# Patient Record
Sex: MALE | Race: WHITE | Employment: OTHER | URBAN - METROPOLITAN AREA
[De-identification: names, ages, dates, MRNs, and addresses within clinical notes are randomized per-mention and may not be internally consistent; named-entity substitution may affect disease eponyms.]

---

## 2017-10-04 ENCOUNTER — HOSPITAL ENCOUNTER (OUTPATIENT)
Dept: GENERAL RADIOLOGY | Age: 72
Discharge: HOME | End: 2017-10-04
Attending: SPECIALIST
Payer: MEDICARE

## 2017-10-04 DIAGNOSIS — R13.19 OTHER DYSPHAGIA: ICD-10-CM

## 2017-10-04 DIAGNOSIS — R63.4 ABNORMAL WEIGHT LOSS: ICD-10-CM

## 2017-10-04 PROCEDURE — 74220 X-RAY XM ESOPHAGUS 1CNTRST: CPT

## 2018-12-27 ENCOUNTER — APPOINTMENT (OUTPATIENT)
Dept: GENERAL RADIOLOGY | Age: 73
DRG: 470 | End: 2018-12-27
Attending: PHYSICIAN ASSISTANT
Payer: MEDICARE

## 2018-12-27 ENCOUNTER — APPOINTMENT (OUTPATIENT)
Dept: CT IMAGING | Age: 73
DRG: 470 | End: 2018-12-27
Attending: PHYSICIAN ASSISTANT
Payer: MEDICARE

## 2018-12-27 ENCOUNTER — HOSPITAL ENCOUNTER (INPATIENT)
Age: 73
LOS: 6 days | Discharge: NURSING, REHAB OR LONG-TERM CARE | DRG: 470 | End: 2019-01-02
Attending: EMERGENCY MEDICINE | Admitting: INTERNAL MEDICINE
Payer: MEDICARE
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
## 2018-12-27 DIAGNOSIS — F03.90 DEMENTIA WITHOUT BEHAVIORAL DISTURBANCE, UNSPECIFIED DEMENTIA TYPE: ICD-10-CM

## 2018-12-27 DIAGNOSIS — R77.8 ELEVATED TROPONIN: ICD-10-CM

## 2018-12-27 DIAGNOSIS — R56.9 SEIZURE (HCC): ICD-10-CM

## 2018-12-27 DIAGNOSIS — S22.000A CLOSED COMPRESSION FRACTURE OF THORACIC VERTEBRA, INITIAL ENCOUNTER (HCC): ICD-10-CM

## 2018-12-27 DIAGNOSIS — S72.002A CLOSED FRACTURE OF LEFT HIP, INITIAL ENCOUNTER (HCC): ICD-10-CM

## 2018-12-27 DIAGNOSIS — N39.0 URINARY TRACT INFECTION WITHOUT HEMATURIA, SITE UNSPECIFIED: ICD-10-CM

## 2018-12-27 PROBLEM — D69.6 THROMBOCYTOPENIA (HCC): Status: ACTIVE | Noted: 2018-12-27

## 2018-12-27 PROBLEM — S72.009A HIP FRACTURE (HCC): Status: ACTIVE | Noted: 2018-12-27

## 2018-12-27 PROBLEM — I10 HTN (HYPERTENSION): Status: ACTIVE | Noted: 2018-12-27

## 2018-12-27 PROBLEM — N40.0 BPH (BENIGN PROSTATIC HYPERPLASIA): Status: ACTIVE | Noted: 2018-12-27

## 2018-12-27 PROBLEM — K21.9 GERD (GASTROESOPHAGEAL REFLUX DISEASE): Status: ACTIVE | Noted: 2018-12-27

## 2018-12-27 PROBLEM — N17.9 ACUTE ON CHRONIC RENAL FAILURE (HCC): Status: ACTIVE | Noted: 2018-12-27

## 2018-12-27 PROBLEM — F32.A ANXIETY AND DEPRESSION: Status: ACTIVE | Noted: 2018-12-27

## 2018-12-27 PROBLEM — E87.0 HYPERNATREMIA: Status: ACTIVE | Noted: 2018-12-27

## 2018-12-27 PROBLEM — S22.019A T1 VERTEBRAL FRACTURE (HCC): Status: ACTIVE | Noted: 2018-12-27

## 2018-12-27 PROBLEM — N18.9 ACUTE ON CHRONIC RENAL FAILURE (HCC): Status: ACTIVE | Noted: 2018-12-27

## 2018-12-27 PROBLEM — F41.9 ANXIETY AND DEPRESSION: Status: ACTIVE | Noted: 2018-12-27

## 2018-12-27 LAB
ALBUMIN SERPL-MCNC: 2.3 G/DL (ref 3.5–5)
ALBUMIN/GLOB SERPL: 0.6 {RATIO} (ref 1.1–2.2)
ALP SERPL-CCNC: 73 U/L (ref 45–117)
ALT SERPL-CCNC: 16 U/L (ref 12–78)
ANION GAP SERPL CALC-SCNC: 10 MMOL/L (ref 5–15)
APPEARANCE UR: ABNORMAL
AST SERPL-CCNC: 17 U/L (ref 15–37)
BACTERIA URNS QL MICRO: NEGATIVE /HPF
BASOPHILS # BLD: 0.1 K/UL (ref 0–0.1)
BASOPHILS NFR BLD: 1 % (ref 0–1)
BILIRUB SERPL-MCNC: 0.2 MG/DL (ref 0.2–1)
BILIRUB UR QL: NEGATIVE
BUN SERPL-MCNC: 31 MG/DL (ref 6–20)
BUN/CREAT SERPL: 12 (ref 12–20)
CALCIUM SERPL-MCNC: 8.9 MG/DL (ref 8.5–10.1)
CHLORIDE SERPL-SCNC: 113 MMOL/L (ref 97–108)
CK SERPL-CCNC: 287 U/L (ref 39–308)
CO2 SERPL-SCNC: 27 MMOL/L (ref 21–32)
COLOR UR: ABNORMAL
COMMENT, HOLDF: NORMAL
COMMENT, HOLDF: NORMAL
CREAT SERPL-MCNC: 2.64 MG/DL (ref 0.7–1.3)
DIFFERENTIAL METHOD BLD: ABNORMAL
EOSINOPHIL # BLD: 0.2 K/UL (ref 0–0.4)
EOSINOPHIL NFR BLD: 2 % (ref 0–7)
EPITH CASTS URNS QL MICRO: ABNORMAL /LPF
ERYTHROCYTE [DISTWIDTH] IN BLOOD BY AUTOMATED COUNT: 14.3 % (ref 11.5–14.5)
GLOBULIN SER CALC-MCNC: 3.9 G/DL (ref 2–4)
GLUCOSE SERPL-MCNC: 124 MG/DL (ref 65–100)
GLUCOSE UR STRIP.AUTO-MCNC: NEGATIVE MG/DL
HCT VFR BLD AUTO: 35.5 % (ref 36.6–50.3)
HGB BLD-MCNC: 11 G/DL (ref 12.1–17)
HGB UR QL STRIP: ABNORMAL
IMM GRANULOCYTES # BLD: 0.2 K/UL (ref 0–0.04)
IMM GRANULOCYTES NFR BLD AUTO: 2 % (ref 0–0.5)
KETONES UR QL STRIP.AUTO: NEGATIVE MG/DL
LEUKOCYTE ESTERASE UR QL STRIP.AUTO: ABNORMAL
LYMPHOCYTES # BLD: 1.2 K/UL (ref 0.8–3.5)
LYMPHOCYTES NFR BLD: 12 % (ref 12–49)
MAGNESIUM SERPL-MCNC: 1.8 MG/DL (ref 1.6–2.4)
MCH RBC QN AUTO: 29.1 PG (ref 26–34)
MCHC RBC AUTO-ENTMCNC: 31 G/DL (ref 30–36.5)
MCV RBC AUTO: 93.9 FL (ref 80–99)
MONOCYTES # BLD: 1.5 K/UL (ref 0–1)
MONOCYTES NFR BLD: 15 % (ref 5–13)
NEUTS SEG # BLD: 6.8 K/UL (ref 1.8–8)
NEUTS SEG NFR BLD: 68 % (ref 32–75)
NITRITE UR QL STRIP.AUTO: POSITIVE
NRBC # BLD: 0 K/UL (ref 0–0.01)
NRBC BLD-RTO: 0 PER 100 WBC
PH UR STRIP: 5.5 [PH] (ref 5–8)
PLATELET # BLD AUTO: 146 K/UL (ref 150–400)
PMV BLD AUTO: 11.9 FL (ref 8.9–12.9)
POTASSIUM SERPL-SCNC: 3.9 MMOL/L (ref 3.5–5.1)
PROT SERPL-MCNC: 6.2 G/DL (ref 6.4–8.2)
PROT UR STRIP-MCNC: 30 MG/DL
RBC # BLD AUTO: 3.78 M/UL (ref 4.1–5.7)
RBC #/AREA URNS HPF: ABNORMAL /HPF (ref 0–5)
RBC MORPH BLD: ABNORMAL
SAMPLES BEING HELD,HOLD: NORMAL
SAMPLES BEING HELD,HOLD: NORMAL
SODIUM SERPL-SCNC: 150 MMOL/L (ref 136–145)
SP GR UR REFRACTOMETRY: 1.01 (ref 1–1.03)
TROPONIN I SERPL-MCNC: 0.17 NG/ML
TROPONIN I SERPL-MCNC: 0.17 NG/ML
UR CULT HOLD, URHOLD: NORMAL
UROBILINOGEN UR QL STRIP.AUTO: 0.2 EU/DL (ref 0.2–1)
VALPROATE SERPL-MCNC: 44 UG/ML (ref 50–100)
WBC # BLD AUTO: 10 K/UL (ref 4.1–11.1)
WBC URNS QL MICRO: >100 /HPF (ref 0–4)

## 2018-12-27 PROCEDURE — 87077 CULTURE AEROBIC IDENTIFY: CPT

## 2018-12-27 PROCEDURE — 74011250636 HC RX REV CODE- 250/636: Performed by: PHYSICIAN ASSISTANT

## 2018-12-27 PROCEDURE — 94761 N-INVAS EAR/PLS OXIMETRY MLT: CPT

## 2018-12-27 PROCEDURE — 83735 ASSAY OF MAGNESIUM: CPT

## 2018-12-27 PROCEDURE — 72170 X-RAY EXAM OF PELVIS: CPT

## 2018-12-27 PROCEDURE — 99284 EMERGENCY DEPT VISIT MOD MDM: CPT

## 2018-12-27 PROCEDURE — 74011000250 HC RX REV CODE- 250: Performed by: EMERGENCY MEDICINE

## 2018-12-27 PROCEDURE — 82550 ASSAY OF CK (CPK): CPT

## 2018-12-27 PROCEDURE — 74011250636 HC RX REV CODE- 250/636: Performed by: INTERNAL MEDICINE

## 2018-12-27 PROCEDURE — 99285 EMERGENCY DEPT VISIT HI MDM: CPT

## 2018-12-27 PROCEDURE — 73552 X-RAY EXAM OF FEMUR 2/>: CPT

## 2018-12-27 PROCEDURE — 84484 ASSAY OF TROPONIN QUANT: CPT

## 2018-12-27 PROCEDURE — 87186 SC STD MICRODIL/AGAR DIL: CPT

## 2018-12-27 PROCEDURE — 77030020186 HC BOOT HL PROTCT SAGE -B

## 2018-12-27 PROCEDURE — 71045 X-RAY EXAM CHEST 1 VIEW: CPT

## 2018-12-27 PROCEDURE — 81001 URINALYSIS AUTO W/SCOPE: CPT

## 2018-12-27 PROCEDURE — 80164 ASSAY DIPROPYLACETIC ACD TOT: CPT

## 2018-12-27 PROCEDURE — 85025 COMPLETE CBC W/AUTO DIFF WBC: CPT

## 2018-12-27 PROCEDURE — 77030011943

## 2018-12-27 PROCEDURE — 74011250636 HC RX REV CODE- 250/636: Performed by: EMERGENCY MEDICINE

## 2018-12-27 PROCEDURE — 96361 HYDRATE IV INFUSION ADD-ON: CPT

## 2018-12-27 PROCEDURE — 36415 COLL VENOUS BLD VENIPUNCTURE: CPT

## 2018-12-27 PROCEDURE — 93005 ELECTROCARDIOGRAM TRACING: CPT

## 2018-12-27 PROCEDURE — 96374 THER/PROPH/DIAG INJ IV PUSH: CPT

## 2018-12-27 PROCEDURE — 96375 TX/PRO/DX INJ NEW DRUG ADDON: CPT

## 2018-12-27 PROCEDURE — 93970 EXTREMITY STUDY: CPT

## 2018-12-27 PROCEDURE — 72125 CT NECK SPINE W/O DYE: CPT

## 2018-12-27 PROCEDURE — 74011000258 HC RX REV CODE- 258: Performed by: INTERNAL MEDICINE

## 2018-12-27 PROCEDURE — 65270000029 HC RM PRIVATE

## 2018-12-27 PROCEDURE — 87086 URINE CULTURE/COLONY COUNT: CPT

## 2018-12-27 PROCEDURE — 70450 CT HEAD/BRAIN W/O DYE: CPT

## 2018-12-27 PROCEDURE — 80053 COMPREHEN METABOLIC PANEL: CPT

## 2018-12-27 PROCEDURE — 74011250637 HC RX REV CODE- 250/637: Performed by: INTERNAL MEDICINE

## 2018-12-27 RX ORDER — MORPHINE SULFATE 4 MG/ML
2 INJECTION INTRAVENOUS
Status: DISCONTINUED | OUTPATIENT
Start: 2018-12-27 | End: 2019-01-02 | Stop reason: HOSPADM

## 2018-12-27 RX ORDER — VALPROIC ACID 250 MG/1
500 CAPSULE, LIQUID FILLED ORAL EVERY 12 HOURS
COMMUNITY
End: 2019-01-02

## 2018-12-27 RX ORDER — SODIUM CHLORIDE 0.9 % (FLUSH) 0.9 %
5-10 SYRINGE (ML) INJECTION EVERY 8 HOURS
Status: DISCONTINUED | OUTPATIENT
Start: 2018-12-27 | End: 2019-01-02 | Stop reason: SDUPTHER

## 2018-12-27 RX ORDER — OXYBUTYNIN CHLORIDE 10 MG/1
10 TABLET, EXTENDED RELEASE ORAL DAILY
COMMUNITY

## 2018-12-27 RX ORDER — ALBUTEROL SULFATE 0.83 MG/ML
2.5 SOLUTION RESPIRATORY (INHALATION)
Status: DISCONTINUED | OUTPATIENT
Start: 2018-12-27 | End: 2019-01-02 | Stop reason: HOSPADM

## 2018-12-27 RX ORDER — MEMANTINE HYDROCHLORIDE 10 MG/1
10 TABLET ORAL
Status: DISCONTINUED | OUTPATIENT
Start: 2018-12-27 | End: 2019-01-02 | Stop reason: HOSPADM

## 2018-12-27 RX ORDER — HYDROCODONE BITARTRATE AND ACETAMINOPHEN 5; 325 MG/1; MG/1
1 TABLET ORAL
Status: DISCONTINUED | OUTPATIENT
Start: 2018-12-27 | End: 2019-01-02 | Stop reason: HOSPADM

## 2018-12-27 RX ORDER — ACETAMINOPHEN 325 MG/1
650 TABLET ORAL
Status: DISCONTINUED | OUTPATIENT
Start: 2018-12-27 | End: 2018-12-31

## 2018-12-27 RX ORDER — TRAMADOL HYDROCHLORIDE 50 MG/1
100 TABLET ORAL
Status: DISCONTINUED | OUTPATIENT
Start: 2018-12-27 | End: 2018-12-27

## 2018-12-27 RX ORDER — DEXTROSE MONOHYDRATE AND SODIUM CHLORIDE 5; .45 G/100ML; G/100ML
100 INJECTION, SOLUTION INTRAVENOUS CONTINUOUS
Status: DISCONTINUED | OUTPATIENT
Start: 2018-12-27 | End: 2018-12-29

## 2018-12-27 RX ORDER — FENTANYL CITRATE 50 UG/ML
25 INJECTION, SOLUTION INTRAMUSCULAR; INTRAVENOUS
Status: COMPLETED | OUTPATIENT
Start: 2018-12-27 | End: 2018-12-27

## 2018-12-27 RX ORDER — ATORVASTATIN CALCIUM 20 MG/1
40 TABLET, FILM COATED ORAL DAILY
Status: DISCONTINUED | OUTPATIENT
Start: 2018-12-27 | End: 2019-01-02 | Stop reason: HOSPADM

## 2018-12-27 RX ORDER — ACETAMINOPHEN 325 MG/1
650 TABLET ORAL
COMMUNITY

## 2018-12-27 RX ORDER — METOPROLOL TARTRATE 25 MG/1
12.5 TABLET, FILM COATED ORAL 2 TIMES DAILY
Status: DISCONTINUED | OUTPATIENT
Start: 2018-12-27 | End: 2019-01-02 | Stop reason: HOSPADM

## 2018-12-27 RX ORDER — SODIUM CHLORIDE 0.9 % (FLUSH) 0.9 %
5-10 SYRINGE (ML) INJECTION AS NEEDED
Status: DISCONTINUED | OUTPATIENT
Start: 2018-12-27 | End: 2019-01-02 | Stop reason: HOSPADM

## 2018-12-27 RX ORDER — DONEPEZIL HYDROCHLORIDE 5 MG/1
10 TABLET, FILM COATED ORAL
Status: DISCONTINUED | OUTPATIENT
Start: 2018-12-27 | End: 2019-01-02 | Stop reason: HOSPADM

## 2018-12-27 RX ORDER — HEPARIN SODIUM 5000 [USP'U]/ML
5000 INJECTION, SOLUTION INTRAVENOUS; SUBCUTANEOUS ONCE
Status: COMPLETED | OUTPATIENT
Start: 2018-12-27 | End: 2018-12-27

## 2018-12-27 RX ORDER — ISOSORBIDE MONONITRATE 30 MG/1
30 TABLET, EXTENDED RELEASE ORAL DAILY
COMMUNITY

## 2018-12-27 RX ORDER — ALBUTEROL SULFATE 0.83 MG/ML
2.5 SOLUTION RESPIRATORY (INHALATION)
COMMUNITY

## 2018-12-27 RX ORDER — LACOSAMIDE 100 MG/1
100 TABLET ORAL 2 TIMES DAILY
Status: DISCONTINUED | OUTPATIENT
Start: 2018-12-27 | End: 2018-12-31

## 2018-12-27 RX ORDER — ATORVASTATIN CALCIUM 40 MG/1
40 TABLET, FILM COATED ORAL
COMMUNITY

## 2018-12-27 RX ORDER — METOPROLOL TARTRATE 25 MG/1
12.5 TABLET, FILM COATED ORAL 2 TIMES DAILY
COMMUNITY
End: 2019-01-02

## 2018-12-27 RX ORDER — PANTOPRAZOLE SODIUM 40 MG/1
40 TABLET, DELAYED RELEASE ORAL DAILY
Status: DISCONTINUED | OUTPATIENT
Start: 2018-12-27 | End: 2019-01-02 | Stop reason: HOSPADM

## 2018-12-27 RX ORDER — LACOSAMIDE 100 MG/1
100 TABLET ORAL 2 TIMES DAILY
COMMUNITY
End: 2019-01-02

## 2018-12-27 RX ORDER — TRAMADOL HYDROCHLORIDE 50 MG/1
50 TABLET ORAL
Status: DISCONTINUED | OUTPATIENT
Start: 2018-12-27 | End: 2018-12-27 | Stop reason: CLARIF

## 2018-12-27 RX ORDER — OXYBUTYNIN CHLORIDE 5 MG/1
10 TABLET, EXTENDED RELEASE ORAL DAILY
Status: DISCONTINUED | OUTPATIENT
Start: 2018-12-28 | End: 2019-01-02 | Stop reason: HOSPADM

## 2018-12-27 RX ORDER — CEFAZOLIN SODIUM/WATER 2 G/20 ML
2 SYRINGE (ML) INTRAVENOUS
Status: DISCONTINUED | OUTPATIENT
Start: 2018-12-27 | End: 2018-12-27

## 2018-12-27 RX ORDER — ISOSORBIDE MONONITRATE 30 MG/1
30 TABLET, EXTENDED RELEASE ORAL DAILY
Status: DISCONTINUED | OUTPATIENT
Start: 2018-12-27 | End: 2019-01-02 | Stop reason: HOSPADM

## 2018-12-27 RX ORDER — TRAMADOL HYDROCHLORIDE 50 MG/1
50 TABLET ORAL
Status: DISCONTINUED | OUTPATIENT
Start: 2018-12-27 | End: 2018-12-27

## 2018-12-27 RX ORDER — ASPIRIN 81 MG/1
81 TABLET ORAL DAILY
COMMUNITY

## 2018-12-27 RX ORDER — VALPROIC ACID 250 MG/1
500 CAPSULE, LIQUID FILLED ORAL EVERY 12 HOURS
Status: DISCONTINUED | OUTPATIENT
Start: 2018-12-27 | End: 2018-12-30

## 2018-12-27 RX ADMIN — METOPROLOL TARTRATE 12.5 MG: 25 TABLET ORAL at 17:42

## 2018-12-27 RX ADMIN — DONEPEZIL HYDROCHLORIDE 10 MG: 5 TABLET, FILM COATED ORAL at 22:36

## 2018-12-27 RX ADMIN — VALPROIC ACID 500 MG: 250 CAPSULE, LIQUID FILLED ORAL at 22:36

## 2018-12-27 RX ADMIN — MEMANTINE 10 MG: 10 TABLET ORAL at 22:36

## 2018-12-27 RX ADMIN — VALPROIC ACID 500 MG: 250 CAPSULE, LIQUID FILLED ORAL at 17:42

## 2018-12-27 RX ADMIN — SODIUM CHLORIDE 500 ML: 900 INJECTION, SOLUTION INTRAVENOUS at 11:20

## 2018-12-27 RX ADMIN — ATORVASTATIN CALCIUM 40 MG: 20 TABLET, FILM COATED ORAL at 14:35

## 2018-12-27 RX ADMIN — VANCOMYCIN HYDROCHLORIDE 1000 MG: 1 INJECTION, POWDER, LYOPHILIZED, FOR SOLUTION INTRAVENOUS at 17:42

## 2018-12-27 RX ADMIN — MORPHINE SULFATE 2 MG: 4 INJECTION, SOLUTION INTRAMUSCULAR; INTRAVENOUS at 14:31

## 2018-12-27 RX ADMIN — LACOSAMIDE 100 MG: 100 TABLET, FILM COATED ORAL at 17:42

## 2018-12-27 RX ADMIN — Medication 10 ML: at 14:31

## 2018-12-27 RX ADMIN — Medication 10 ML: at 22:37

## 2018-12-27 RX ADMIN — DEXTROSE MONOHYDRATE AND SODIUM CHLORIDE 100 ML/HR: 5; .45 INJECTION, SOLUTION INTRAVENOUS at 13:44

## 2018-12-27 RX ADMIN — DEXTROSE MONOHYDRATE AND SODIUM CHLORIDE 100 ML/HR: 5; .45 INJECTION, SOLUTION INTRAVENOUS at 23:36

## 2018-12-27 RX ADMIN — ISOSORBIDE MONONITRATE 30 MG: 30 TABLET, EXTENDED RELEASE ORAL at 14:35

## 2018-12-27 RX ADMIN — HEPARIN SODIUM 5000 UNITS: 5000 INJECTION INTRAVENOUS; SUBCUTANEOUS at 22:36

## 2018-12-27 RX ADMIN — CEFTRIAXONE SODIUM 1 G: 1 INJECTION, POWDER, FOR SOLUTION INTRAMUSCULAR; INTRAVENOUS at 12:24

## 2018-12-27 RX ADMIN — PANTOPRAZOLE SODIUM 40 MG: 40 TABLET, DELAYED RELEASE ORAL at 14:35

## 2018-12-27 RX ADMIN — FENTANYL CITRATE 25 MCG: 50 INJECTION, SOLUTION INTRAMUSCULAR; INTRAVENOUS at 12:07

## 2018-12-27 NOTE — PROGRESS NOTES
12/27/2018 4:04 PM Spoke with pt's wife who reported she has spoke with [facility] in admissions at [facility] and is paying to hold a bed for pt for 3 days. Pt's wife is paying $425 a day to hold pt's bed at [facility]. CM will follow. 12/27/2018 1:59 PM Spoke with pt's wife, she is going to contact [facility] about holding pt's bed. Pt's wife confirmed she does want pt to return to [facility] at discharge. Referral sent to [facility] via [employer]. CM will follow up.  SHEKHAR Redd

## 2018-12-27 NOTE — CONSULTS
Luh Yap MD, [address] [place] [address]  Shailesh Thornton 33  [phone]    Date of  Admission: 12/27/2018  9:53 AM         IMPRESSION and RECOMMENDATIONS     1.  CAD:  S/P Fall resulting in left hip Fx. Tentatively for repair tomorrow. No active cardiac symptoms, non-ischemic EKG, minimally abnormal troponin. I suspect that his minimally abnormal troponin is due to supply-demand mismatch in a patient with tachycardia due to pain and a chronically occluded LCx by cath (3/2017). Of note, echo 5/4/18 revealed normal LVF (EF 55-60%). I agree with Dr. Tamar Colon plan of checking a 6h troponin. If fairly unremarkable (i.e., < 1), then Mr. Heather Hassan is a Low Risk patient from a cardiac perspective who will be undergoing a Moderate Risk procedure (especially if hip is to be nailed as opposed to ORIF). I recommend proceeding ahead with the procedure without further cardiac testing at this time. Continue metoprolol, lipitor, imdur. Resume ASA when feasible.     Sparkill Kaushik)       Problem List  Date Reviewed: 12/27/2018          Codes Class Noted    Hip fracture ([facility] ICD-10-CM: R88.172L  ICD-9-CM: 820.8  12/27/2018        Thrombocytopenia ([facility] ICD-10-CM: D69.6  ICD-9-CM: 287.5  12/27/2018        Anxiety and depression ICD-10-CM: F41.9, F32.9  ICD-9-CM: 300.00, 311  12/27/2018        BPH (benign prostatic hyperplasia) ICD-10-CM: N40.0  ICD-9-CM: 600.00  12/27/2018        GERD (gastroesophageal reflux disease) ICD-10-CM: K21.9  ICD-9-CM: 530.81  12/27/2018        HTN (hypertension) ICD-10-CM: I10  ICD-9-CM: 401.9  12/27/2018        Seizure ([facility] ICD-10-CM: R56.9  ICD-9-CM: 780.39  12/27/2018        UTI (urinary tract infection) ICD-10-CM: N39.0  ICD-9-CM: 599.0  12/27/2018        Hypernatremia ICD-10-CM: E87.0  ICD-9-CM: 276.0  12/27/2018        Acute on chronic renal failure ([facility] ICD-10-CM: N17.9, N18.9  ICD-9-CM: 584.9, 585.9  12/27/2018        * (Principal) Closed displaced fracture of left femoral neck ([facility] ICD-10-CM: G61.108S  ICD-9-CM: 820.8  12/27/2018        Fall ICD-10-CM: H85. Latrice Ospina  ICD-9-CM: E888.9  12/27/2018        Dementia ICD-10-CM: F03.90  ICD-9-CM: 294.20  12/27/2018        Trimalleolar fracture of right ankle ICD-10-CM: O68.076T  ICD-9-CM: 824.6  10/24/2012              History of Present Illness:     Bashir Tay is a 68 y.o. male with the above problem list who was admitted for Hip fracture ([facility]  Hip fracture ([facility] Hx obtained from chart as Pt is in pain. \"I can't eat. My back hurts. \"  Apparently, he fell again resulting in left hip Fx last PM.      He denies chest pain/discomfort, shortness of breath, dyspnea on exertion, orthopnea, paroxysmal noctural dyspnea, lower extremity edema, palpitations, syncope, or near-syncope.     Current Facility-Administered Medications   Medication Dose Route Frequency    morphine injection 2 mg  2 mg IntraVENous Q4H PRN    sodium chloride (NS) flush 5-10 mL  5-10 mL IntraVENous Q8H    sodium chloride (NS) flush 5-10 mL  5-10 mL IntraVENous PRN    [START ON 12/28/2018] cefTRIAXone (ROCEPHIN) 1 g in 0.9% sodium chloride (MBP/ADV) 50 mL  1 g IntraVENous Q24H    dextrose 5 % - 0.45% NaCl infusion  100 mL/hr IntraVENous CONTINUOUS    HYDROcodone-acetaminophen (NORCO) 5-325 mg per tablet 1 Tab  1 Tab Oral Q6H PRN    vancomycin (VANCOCIN) 1,000 mg in 0.9% sodium chloride (MBP/ADV) 250 mL  1,000 mg IntraVENous ON CALL TO OR    acetaminophen (TYLENOL) tablet 650 mg  650 mg Oral Q6H PRN    albuterol (PROVENTIL VENTOLIN) nebulizer solution 2.5 mg  2.5 mg Nebulization Q4H PRN    atorvastatin (LIPITOR) tablet 40 mg  40 mg Oral DAILY    donepezil (ARICEPT) tablet 10 mg  10 mg Oral QHS    pantoprazole (PROTONIX) tablet 40 mg  40 mg Oral DAILY    isosorbide mononitrate ER (IMDUR) tablet 30 mg  30 mg Oral DAILY    lacosamide (VIMPAT) tablet 100 mg  100 mg Oral BID    memantine (NAMENDA) tablet 10 mg 10 mg Oral QHS    metoprolol tartrate (LOPRESSOR) tablet 12.5 mg  12.5 mg Oral BID    [START ON 12/28/2018] oxybutynin chloride XL (DITROPAN XL) tablet 10 mg  10 mg Oral DAILY    valproic acid (DEPAKENE) capsule 500 mg  500 mg Oral Q12H      Allergies   Allergen Reactions    Augmentin [Amoxicillin-Pot Clavulanate] Rash      History reviewed. No pertinent family history. Social History     Socioeconomic History    Marital status:      Spouse name: Not on file    Number of children: Not on file    Years of education: Not on file    Highest education level: Not on file   Social Needs    Financial resource strain: Not on file    Food insecurity - worry: Not on file    Food insecurity - inability: Not on file    Transportation needs - medical: Not on file    Transportation needs - non-medical: Not on file   Occupational History    Not on file   Tobacco Use    Smoking status: Never Smoker    Smokeless tobacco: Never Used   Substance and Sexual Activity    Alcohol use: No    Drug use: No    Sexual activity: Not on file   Other Topics Concern    Not on file   Social History Narrative    Not on file       Physical Exam:     Patient Vitals for the past 16 hrs:   BP Temp Pulse Resp SpO2 Height Weight   12/27/18 1337 (!) 147/98 98.1 °F (36.7 °C) 89 16 97 %     12/27/18 1300 (!) 112/91    98 %     12/27/18 1208 (!) 135/93    93 %     12/27/18 0955 114/86 98.2 °F (36.8 °C) 97 18 93 % 6' (1.829 m) 82.6 kg (182 lb)       HEENT Exam:     Normocephalic, atraumatic. EOMI. Oropharynx negative. Neck supple. No lymphadenopathy. Lung Exam:     The patient is not dyspneic. There is no cough. Breath sounds are heard equally in all lung fields. There are no wheezes, rales, rhonchi, or rubs heard on auscultation. Heart Exam:     The rhythm is regular. The PMI is in the 5th intercostal space of the MCL. Apical impulse is normal. S1 is regular. S2 is physiologic.  There is no S3, S4 gallop, murmur, click, or rub. Abdomen Exam:     Bowel sounds are normoactive. Abdomen soft in all quadrants. No tenderness. No palpable masses. No organomegaly. No hernias noted. No bruits or pulsatile mass. Extremities Exam:     The extremities are atraumatic appearing. There is no clubbing, cyanosis, edema, ulcers, varicose veins, rash, erythemia noted in the extremities. The neurovascular status is grossly intact with normal distal sensation and pulses. Vascular Exam:     The radial, brachial, dorsalis pedis, posterior tibial, are equal and strong bilaterally The carotids are equal bilaterally without bruits. Labs:     Lab Results   Component Value Date/Time    Glucose 124 (H) 12/27/2018 11:20 AM    Sodium 150 (H) 12/27/2018 11:20 AM    Potassium 3.9 12/27/2018 11:20 AM    Chloride 113 (H) 12/27/2018 11:20 AM    CO2 27 12/27/2018 11:20 AM    BUN 31 (H) 12/27/2018 11:20 AM    Creatinine 2.64 (H) 12/27/2018 11:20 AM    Calcium 8.9 12/27/2018 11:20 AM     Recent Labs     12/27/18  1120   WBC 10.0   HGB 11.0*   HCT 35.5*   *     Recent Labs     12/27/18  1120   SGOT 17   ALT 16   AP 73   TBILI 0.2   TP 6.2*   ALB 2.3*   GLOB 3.9     No results for input(s): INR, PTP, APTT in the last 72 hours. No lab exists for component: INREXT   Recent Labs     12/27/18  1120        Recent Labs     12/27/18  1120   TROIQ 0.17*     Lab Results   Component Value Date/Time    Cholesterol, total 185 10/06/2009 12:48 PM    HDL Cholesterol 54 10/06/2009 12:48 PM    LDL, calculated 116.8 (H) 10/06/2009 12:48 PM    Triglyceride 71 10/06/2009 12:48 PM    CHOL/HDL Ratio 3.4 10/06/2009 12:48 PM       EKG:  ST @ 115.

## 2018-12-27 NOTE — ED NOTES
Pt asleep and does not appear to be in any distress. Pt's wife states that he does not need his Fentanyl at this time.

## 2018-12-27 NOTE — H&P
[facility]  [address] [facility] [address], [facility] [address]  [phone]    Admission History and Physical      NAME:  Maya Rouse   :   1945   MRN:  [id number]     PCP:  Maryellen Madera DO     Date/Time:  2018         Subjective:     CHIEF COMPLAINT: fall, hip pain      HISTORY OF PRESENT ILLNESS:     Mr. Alma Garza is a 68 y.o.  male who is admitted with femoral fracture. Mr. Alma Garza with PMH of anxiety, depression, GERD, SZD, fall presented to ER after a fall yesterday evening. Pt is in a SNF rehab center since last week, where he was admitted due to fall. Has been falling frequently recently. Yesterday evening, he fell and hit his head and LT hip. This morning, he was found by nursing staff. Pt has dementia and unable to provide Hx. Past Medical History:   Diagnosis Date    Anxiety     BPH (benign prostatic hyperplasia)     Depression     GERD (gastroesophageal reflux disease)     Hypertension     Seizures (HCC)         Past Surgical History:   Procedure Laterality Date    HX COLONOSCOPY      HX HEENT      nasal septum    DC EGD DELIVER THERMAL ENERGY SPHNCTR/CARDIA GERD         Social History     Tobacco Use    Smoking status: Never Smoker    Smokeless tobacco: Never Used   Substance Use Topics    Alcohol use: No        History reviewed. No pertinent family history. Allergies   Allergen Reactions    Augmentin [Amoxicillin-Pot Clavulanate] Rash        Prior to Admission medications    Medication Sig Start Date End Date Taking? Authorizing Provider   atorvastatin (LIPITOR) 40 mg tablet Take 40 mg by mouth daily. Yes Provider, Historical   metoprolol tartrate (LOPRESSOR) 25 mg tablet Take 12.5 mg by mouth two (2) times a day. Yes Provider, Historical   isosorbide mononitrate ER (IMDUR) 30 mg tablet Take 30 mg by mouth daily. Yes Provider, Historical   aspirin delayed-release 81 mg tablet Take 81 mg by mouth daily.    Yes Provider, Historical   lacosamide (VIMPAT) 100 mg tab tablet Take 100 mg by mouth two (2) times a day. Yes Provider, Historical   valproic acid (DEPAKENE) 250 mg capsule Take 500 mg by mouth every twelve (12) hours. Yes Provider, Historical   oxybutynin chloride XL (DITROPAN XL) 10 mg CR tablet Take 10 mg by mouth daily. Yes Provider, Historical   acetaminophen (TYLENOL) 325 mg tablet Take 650 mg by mouth every six (6) hours as needed for Pain. Yes Provider, Historical   albuterol (PROVENTIL VENTOLIN) 2.5 mg /3 mL (0.083 %) nebulizer solution 2.5 mg by Nebulization route every four (4) hours as needed for Wheezing or Shortness of Breath. Yes Provider, Historical   memantine (NAMENDA) 10 mg tablet Take 10 mg by mouth nightly. Yes Other, MD Natalya   donepezil (ARICEPT) 10 mg tablet Take 10 mg by mouth nightly. Yes Other, MD Natalya   esomeprazole (NEXIUM) 40 mg capsule Take 40 mg by mouth daily. Yes Other, MD Natalya   budesonide-formoterol (SYMBICORT) 160-4.5 mcg/actuation HFA inhaler Take 2 Puffs by inhalation two (2) times a day.      Yes Other, MD Natalya         Review of Systems:  (bold if positive, if negative)    Gen:  Eyes:  ENT:  CVS:  Pulm:  GI:    :    MS:  Pain,Skin:  Psych:  Endo:    Hem:  Renal:    Neuro:            Objective:      VITALS:    Vital signs reviewed; most recent are:    Visit Vitals  BP (!) 112/91   Pulse 97   Temp 98.2 °F (36.8 °C)   Resp 18   Ht 6' (1.829 m)   Wt 82.6 kg (182 lb)   SpO2 98%   BMI 24.68 kg/m²     SpO2 Readings from Last 6 Encounters:   12/27/18 98%   12/22/16 99%   08/22/15 95%   10/24/12 98%            Intake/Output Summary (Last 24 hours) at 12/27/2018 1312  Last data filed at 12/27/2018 1208  Gross per 24 hour   Intake 500 ml   Output    Net 500 ml            Exam:     Physical Exam:    Gen:  malnourished, in no acute distress  HEENT:  Pink conjunctivae, PERRL, hearing intact to voice, moist mucous membranes  Neck:  Supple, without masses, thyroid non-tender  Resp: No accessory muscle use, clear breath sounds without wheezes rales or rhonchi  Card:  No murmurs, normal S1, S2 without thrills, bruits or peripheral edema  Abd:  Soft, non-tender, non-distended, normoactive bowel sounds are present, no palpable organomegaly and no detectable hernias  Lymph:  No cervical or inguinal adenopathy  Musc:  No cyanosis or clubbing  Skin:  No rashes or ulcers, skin turgor is good  Neuro:  Cranial nerves are grossly intact, no focal motor weakness,   Psych:  poor insight,        Labs:    Recent Labs     12/27/18  1120   WBC 10.0   HGB 11.0*   HCT 35.5*   *     Recent Labs     12/27/18  1120   *   K 3.9   *   CO2 27   *   BUN 31*   CREA 2.64*   CA 8.9   MG 1.8   ALB 2.3*   TBILI 0.2   SGOT 17   ALT 16     No results found for: GLUCPOC  No results for input(s): PH, PCO2, PO2, HCO3, FIO2 in the last 72 hours. No results for input(s): INR in the last 72 hours. No lab exists for component: INREXT    Telemetry reviewed:   normal sinus rhythm       Assessment/Plan:    1. Closed displaced fracture of left femoral neck (HCC) (12/27/2018)/ Acute mild T1 compression fracture. Admit to orthopedics floor. pain management, NPO after midnight. Pt was already seen by orthopedics. 2.  Regarding medical clearance. Pt has mildly elevated troponin and according to his wife has cardiac problem and follows with Dr Lourdes Berry. Will ask cardiology, Dr Kristel Cevallos for cardiac clearance     3. Acute on chronic renal failure ([address] (12/27/2018). Pt looks volume depleted. Start IVF and monitor. 4.  Hypernatremia (12/27/2018). Start D5 0.45 NS for now and monitor. 5.  UTI (urinary tract infection) (12/27/2018)(POA). Start ceftriaxone and check UC. 6. Thrombocytopenia ([address] (12/27/2018), mild. Monitor. 7.  Anxiety and depression (12/27/2018). Continue home meds     8. BPH (benign prostatic hyperplasia) (12/27/2018). Continue home meds     9.   GERD (gastroesophageal reflux disease) (12/27/2018). On PPI     10. HTN (hypertension) (12/27/2018). Continue metoprolol and imdur     11. Seizure ([address] (12/27/2018). On vimpat and valproic acid. Seizure precaution. Check valproic acid. 12.  Fall (12/27/2018). Fall precaution     13. Dementia (12/27/2018). Continue aricept and namenda. Supportive care     14. Protein- calorie malnutrition. Nutrition consult. 15.  Elevated troponin, mild. Check 2nd troponin after 6 hours and cardiology consult.      Signed out to [employer] at 01:30 PM     Previous medical records reviewed     Risk of deterioration: high      Total time spent with patient: 79 [address] [facility] discussed with: Patient, Family, Nursing Staff, Consultant/Specialist and >50% of time spent in counseling and coordination of care    Discussed:  Care Plan    Prophylaxis:  Hep SQ    Probable Disposition:  SNF/LTC           ___________________________________________________    Attending Physician: Edgar Shahid MD

## 2018-12-27 NOTE — PROGRESS NOTES
[facility]: MED RECONCILIATION    Comments/Recommendations:    Medication list faxed over from [facility] (scanned into chart). Patient did not receive doses of his AM medications today. Last doses 12/26 PM.    Medications added:     · Atorvastatin 40 mg daily  · Metoprolol tartrate 12.5 mg BID  · Vimpat 100 mg BID  · Valproic acid 500 mg Q12 hours  · Albuterol sulfate nebs 2.5 mg Q4 PRN    Medications removed:    · Alendronate 70 mg tab  · Fluticasone 0.005% ointment  · Norco 5/325 mg tab  · Lamotrigine 200 mg TID  · Lisinopril 20 mg daily  · Lithium 300 mg daily  · Lorazepam 1 mg Q4 PRN    Medications adjusted:    · Oxybutynin dose adjusted to 10 mg XL daily    Information obtained from: [facility] medication list, Patient's family    Allergies: Augmentin [amoxicillin-pot clavulanate]    Prior to Admission Medications:     Medication Documentation Review Audit       Reviewed by Stephen Ernandez (Pharmacist) on 12/27/18 at 1223      Medication Sig Documenting Provider Last Dose Status Taking?   acetaminophen (TYLENOL) 325 mg tablet Take 650 mg by mouth every six (6) hours as needed for Pain. Provider, Historical  Active Yes   albuterol (PROVENTIL VENTOLIN) 2.5 mg /3 mL (0.083 %) nebulizer solution 2.5 mg by Nebulization route every four (4) hours as needed for Wheezing or Shortness of Breath. Provider, Historical  Active Yes   aspirin delayed-release 81 mg tablet Take 81 mg by mouth daily. Provider, Historical 12/26/2018 AM Active Yes   atorvastatin (LIPITOR) 40 mg tablet Take 40 mg by mouth daily. Provider, Historical 12/26/2018 AM Active Yes   budesonide-formoterol (SYMBICORT) 160-4.5 mcg/actuation HFA inhaler Take 2 Puffs by inhalation two (2) times a day. Natalya Meneses MD 12/26/2018 PM Active Yes   donepezil (ARICEPT) 10 mg tablet Take 10 mg by mouth nightly. Natalya Meneses MD 12/26/2018 PM Active Yes   esomeprazole (NEXIUM) 40 mg capsule Take 40 mg by mouth daily.    Natalya Meneses MD 12/26/2018 AM Active Yes   isosorbide mononitrate ER (IMDUR) 30 mg tablet Take 30 mg by mouth daily. Provider, Historical 12/26/2018 AM Active Yes   lacosamide (VIMPAT) 100 mg tab tablet Take 100 mg by mouth two (2) times a day. Provider, Historical 12/26/2018 PM Active Yes   memantine (NAMENDA) 10 mg tablet Take 10 mg by mouth nightly. Other, MD Natalya 12/26/2018 PM Active Yes   metoprolol tartrate (LOPRESSOR) 25 mg tablet Take 12.5 mg by mouth two (2) times a day. Provider, Historical 12/26/2018 PM Active Yes   oxybutynin chloride XL (DITROPAN XL) 10 mg CR tablet Take 10 mg by mouth daily. Provider, Historical 12/26/2018 AM Active Yes   valproic acid (DEPAKENE) 250 mg capsule Take 500 mg by mouth every twelve (12) hours.  Provider, Historical 12/26/2018 PM Active Yes                  BLAS WorthingtonD   Contact: [phone]

## 2018-12-27 NOTE — ED PROVIDER NOTES
68 y.o. male with past medical history significant for anxiety, HTN, depression, seizures, BPH, and GERD who presents from [facility] via EMS for evaluation s/p fall. Patient was recently discharged from THE [facility] for a fall, fatigue, and gait disturbance. Due to his difficulty with ambulation upon discharge, he became a resident of [facility]. He moved to [facility] on Wednesday 12/19/18. Yesterday evening, patient reports that he got up unassisted and fell. He states that he hit his head and complains of left-sided hip pain. He notes that his legs feel weak as well. He was found by nursing staff this morning on the floor. Per spouse, patient's mental status appears to be at baseline. She admits, however, that he had some confusion 5 days ago. She notes that the patient still has not been eating or drinking adequately since moving to [facility]. Patient denies chest pain, dizziness, and SOB. There are no other acute medical concerns at this time. Social hx: negative tobacco use; negative alcohol use; negative drug use  PCP: Ben Jones DO    Note written by Zi Nicolas, as dictated by POLO Villalpando 10:10 AM        The history is provided by the patient, the EMS personnel and the spouse. No  was used. Past Medical History:   Diagnosis Date    Anxiety     BPH (benign prostatic hyperplasia)     Depression     GERD (gastroesophageal reflux disease)     Hypertension     Seizures (HCC)        Past Surgical History:   Procedure Laterality Date    HX COLONOSCOPY      HX HEENT      nasal septum    SC EGD DELIVER THERMAL ENERGY SPHNCTR/CARDIA GERD           History reviewed. No pertinent family history.     Social History     Socioeconomic History    Marital status:      Spouse name: Not on file    Number of children: Not on file    Years of education: Not on file    Highest education level: Not on file   Social Needs    Financial resource strain: Not on file    Food insecurity - worry: Not on file    Food insecurity - inability: Not on file   [employer] needs - medical: Not on file   [employer] needs - non-medical: Not on file   Occupational History    Not on file   Tobacco Use    Smoking status: Never Smoker    Smokeless tobacco: Never Used   Substance and Sexual Activity    Alcohol use: No    Drug use: No    Sexual activity: Not on file   Other Topics Concern    Not on file   Social History Narrative    Not on file         ALLERGIES: Augmentin [amoxicillin-pot clavulanate]    Review of Systems   Constitutional: Positive for appetite change. Negative for chills and fever. HENT: Negative for congestion, rhinorrhea, sneezing and sore throat. Eyes: Negative for redness and visual disturbance. Respiratory: Negative for shortness of breath. Cardiovascular: Negative for chest pain and leg swelling. Gastrointestinal: Negative for abdominal pain, nausea and vomiting. Genitourinary: Negative for difficulty urinating and frequency. Musculoskeletal: Positive for arthralgias (left hip pain) and gait problem. Negative for back pain, myalgias and neck stiffness. Skin: Negative for rash. Neurological: Positive for weakness (Lower extremities) and headaches. Negative for dizziness and syncope. Hematological: Negative for adenopathy. Psychiatric/Behavioral: Negative for confusion (confused 5 days ago per spouse). Patient Vitals for the past 12 hrs:   Temp Pulse Resp BP SpO2   12/27/18 1548 98.4 °F (36.9 °C) 77 16 92/53 95 %   12/27/18 1337 98.1 °F (36.7 °C) 89 16 (!) 147/98 97 %   12/27/18 1300    (!) 112/91 98 %   12/27/18 1208    (!) 135/93 93 %   12/27/18 0955 98.2 °F (36.8 °C) 97 18 114/86 93 %              Physical Exam   Constitutional: He is oriented to person, place, and time. He appears well-developed and well-nourished. No distress.    HENT:   Head: Normocephalic. Right Ear: External ear normal.   Left Ear: External ear normal.   Nose: Nose normal.   Mouth/Throat: No oropharyngeal exudate. Contusion to left forehead, mm dry   Eyes: EOM are normal. Pupils are equal, round, and reactive to light. Right eye exhibits no discharge. Left eye exhibits no discharge. Neck: Neck supple. No JVD present. No tracheal deviation present. Non-tender to midline and throughout. No swelling or step off. No discoloration or deformity. No lesions. Moves neck full ROM without diff against resistance.  5/5 bilaterally. Cardiovascular: Regular rhythm, normal heart sounds and intact distal pulses. Exam reveals no gallop and no friction rub. No murmur heard. tachycardia   Pulmonary/Chest: Effort normal and breath sounds normal. No stridor. No respiratory distress. He has no wheezes. He has no rales. He exhibits no tenderness. Abdominal: Soft. Bowel sounds are normal. He exhibits no distension and no mass. There is no tenderness. There is no rebound and no guarding. No hernia. Musculoskeletal: Normal range of motion. He exhibits tenderness. He exhibits no edema or deformity. Back:Non-tender to midline and throughout no swelling or step off. No discoloration. No deformity or lesions. Distal n/v intact. Cap refill brisk  Left hip diffusely ttp along groin and greater trochanteric areas. No swelling, discoloration or lesions. ROM limited by pain. Distal n/v intact. Cap refill brisk. Normothermic. Unable to ambulate at baseline   Lymphadenopathy:     He has no cervical adenopathy. Neurological: He is alert and oriented to person, place, and time. No cranial nerve deficit. Coordination normal.   Skin: Skin is warm and dry. Capillary refill takes less than 2 seconds. No rash noted. No erythema. No pallor. Psychiatric: He has a normal mood and affect. His behavior is normal.   Nursing note and vitals reviewed.        MDM  Number of Diagnoses or Management Options  Closed compression fracture of thoracic vertebra, initial encounter [facility]):   Closed fracture of left hip, initial encounter [facility]):   Elevated troponin:   Fall, initial encounter:   Urinary tract infection without hematuria, site unspecified:      Amount and/or Complexity of Data Reviewed  Clinical lab tests: ordered and reviewed  Tests in the radiology section of CPT®: ordered and reviewed  Tests in the medicine section of CPT®: ordered and reviewed  Obtain history from someone other than the patient: yes (Wife, ems)  Review and summarize past medical records: yes  Independent visualization of images, tracings, or specimens: yes    Patient Progress  Patient progress: stable         Procedures  10:21 AM  Discussed pt, sx, hx and current findings with Annia Isaac MD. He is in agreement with plan and will see pt. Will get labs, imaging, ekg and continue to monitor  [facility]      ED EKG interpretation: 10:14 AM  Rhythm: sinus tachycardia non specific intraventricular block; and regular . Rate (approx.): 115; Axis: normal; P wave: normal; QRS interval: normal ; ST/T wave: normal; Other findings: abnormal ekg. This EKG was interpreted by Annia Isaac MD,ED Provider. 12:07 PM  [employer] [place] spoke with Dr. Ena Sanderson and Elian Weathers PA-C, Consult for Orthopedics. Discussed available diagnostic tests and clinical findings. He is in agreement with care plans as outlined. He will see pt in consult  POLO Pang       12:34 PM  [facility] spoke with Dr. Alejandro Kan, Consult for Hospitalist. Discussed available diagnostic tests and clinical findings. He is in agreement with care plans as outlined.  He will admit  POLO Pang    LABS COMPLETED AND REVIEWED:  Recent Results (from the past 12 hour(s))   CBC WITH AUTOMATED DIFF    Collection Time: 12/27/18 11:20 AM   Result Value Ref Range    WBC 10.0 4.1 - 11.1 K/uL    RBC 3.78 (L) 4.10 - 5.70 M/uL    HGB 11.0 (L) 12.1 - 17.0 g/dL    HCT 35.5 (L) 36.6 - 50.3 %    MCV 93.9 80.0 - 99.0 FL    MCH 29.1 26.0 - 34.0 PG    MCHC 31.0 30.0 - 36.5 g/dL    RDW 14.3 11.5 - 14.5 %    PLATELET 543 (L) 267 - 400 K/uL    MPV 11.9 8.9 - 12.9 FL    NRBC 0.0 0  WBC    ABSOLUTE NRBC 0.00 0.00 - 0.01 K/uL    NEUTROPHILS 68 32 - 75 %    LYMPHOCYTES 12 12 - 49 %    MONOCYTES 15 (H) 5 - 13 %    EOSINOPHILS 2 0 - 7 %    BASOPHILS 1 0 - 1 %    IMMATURE GRANULOCYTES 2 (H) 0.0 - 0.5 %    ABS. NEUTROPHILS 6.8 1.8 - 8.0 K/UL    ABS. LYMPHOCYTES 1.2 0.8 - 3.5 K/UL    ABS. MONOCYTES 1.5 (H) 0.0 - 1.0 K/UL    ABS. EOSINOPHILS 0.2 0.0 - 0.4 K/UL    ABS. BASOPHILS 0.1 0.0 - 0.1 K/UL    ABS. IMM. GRANS. 0.2 (H) 0.00 - 0.04 K/UL    DF AUTOMATED      RBC COMMENTS POIKILOCYTOSIS  1+        RBC COMMENTS OVALOCYTES  1+        RBC COMMENTS ANISOCYTOSIS  1+        RBC COMMENTS MICROCYTOSIS  1+       METABOLIC PANEL, COMPREHENSIVE    Collection Time: 12/27/18 11:20 AM   Result Value Ref Range    Sodium 150 (H) 136 - 145 mmol/L    Potassium 3.9 3.5 - 5.1 mmol/L    Chloride 113 (H) 97 - 108 mmol/L    CO2 27 21 - 32 mmol/L    Anion gap 10 5 - 15 mmol/L    Glucose 124 (H) 65 - 100 mg/dL    BUN 31 (H) 6 - 20 MG/DL    Creatinine 2.64 (H) 0.70 - 1.30 MG/DL    BUN/Creatinine ratio 12 12 - 20      GFR est AA 29 (L) >60 ml/min/1.73m2    GFR est non-AA 24 (L) >60 ml/min/1.73m2    Calcium 8.9 8.5 - 10.1 MG/DL    Bilirubin, total 0.2 0.2 - 1.0 MG/DL    ALT (SGPT) 16 12 - 78 U/L    AST (SGOT) 17 15 - 37 U/L    Alk.  phosphatase 73 45 - 117 U/L    Protein, total 6.2 (L) 6.4 - 8.2 g/dL    Albumin 2.3 (L) 3.5 - 5.0 g/dL    Globulin 3.9 2.0 - 4.0 g/dL    A-G Ratio 0.6 (L) 1.1 - 2.2     TROPONIN I    Collection Time: 12/27/18 11:20 AM   Result Value Ref Range    Troponin-I, Qt. 0.17 (H) <0.05 ng/mL   MAGNESIUM    Collection Time: 12/27/18 11:20 AM   Result Value Ref Range    Magnesium 1.8 1.6 - 2.4 mg/dL   URINALYSIS W/MICROSCOPIC    Collection Time: 12/27/18 11:20 AM   Result Value Ref Range    Color YELLOW/STRAW      Appearance CLOUDY (A) CLEAR      Specific gravity 1.015 1.003 - 1.030      pH (UA) 5.5 5.0 - 8.0      Protein 30 (A) NEG mg/dL    Glucose NEGATIVE  NEG mg/dL    Ketone NEGATIVE  NEG mg/dL    Bilirubin NEGATIVE  NEG      Blood SMALL (A) NEG      Urobilinogen 0.2 0.2 - 1.0 EU/dL    Nitrites POSITIVE (A) NEG      Leukocyte Esterase LARGE (A) NEG      WBC >100 (H) 0 - 4 /hpf    RBC 0-5 0 - 5 /hpf    Epithelial cells FEW FEW /lpf    Bacteria NEGATIVE  NEG /hpf   URINE CULTURE HOLD SAMPLE    Collection Time: 12/27/18 11:20 AM   Result Value Ref Range    Urine culture hold        URINE ON HOLD IN MICROBIOLOGY DEPT FOR 3 DAYS. IF UNPRESERVED URINE IS SUBMITTED, IT CANNOT BE USED FOR ADDITIONAL TESTING AFTER 24 HRS, RECOLLECTION WILL BE REQUIRED. CK    Collection Time: 12/27/18 11:20 AM   Result Value Ref Range     39 - 308 U/L   SAMPLES BEING HELD    Collection Time: 12/27/18 11:30 AM   Result Value Ref Range    SAMPLES BEING HELD 1SST, 1RED, 1DRK GRN, 1BLU     COMMENT        Add-on orders for these samples will be processed based on acceptable specimen integrity and analyte stability, which may vary by analyte. IMAGING COMPLETED AND REVIEWED:  The following have been ordered and reviewed:    Xr Chest Sngl V    Result Date: 12/27/2018  PORTABLE CHEST RADIOGRAPH/S: 12/27/2018 11:00 AM INDICATION: Chest pain. Ground-level fall. COMPARISON: None. TECHNIQUE: Portable frontal 12/22/2016, 8/22/2015. radiograph/s of the chest. FINDINGS: Bilateral upper lobe scarring and volume loss has increased slightly from 2015. The lungs are otherwise clear There is a large hiatal hernia. The central airways are patent. No pneumothorax or pleural effusion. Incidental note is made of right hemidiaphragm eventration. IMPRESSION: No acute disease in the chest.    Xr Pelv Ap Only    Result Date: 12/27/2018  EXAM: XR PELV AP ONLY INDICATION: fall, left hip pain. COMPARISON: None.  FINDINGS: An AP view of the pelvis and a frogleg lateral view of the left hip demonstrate a left femoral neck fracture with slight superior displacement of the distal femoral fracture fragment. IMPRESSION: Left femoral neck fracture with slight superior displacement. Xr Femur Lt 2 V    Result Date: 12/27/2018  EXAM: XR FEMUR LT 2 V INDICATION: fall, left hip pain. COMPARISON: None. FINDINGS: Two views of the left femur demonstrate a left femoral neck fracture with slight superior displacement of the distal femoral fracture fragment. Vascular calcification is noted. IMPRESSION: Left femoral neck fracture with slight superior displacement. Ct Head Wo Cont    Result Date: 12/27/2018  INDICATION:   fall, head injury EXAM:  HEAD CT WITHOUT CONTRAST COMPARISON:  12/22/2016 TECHNIQUE:  Routine noncontrast axial head CT was performed. Coronal and sagittal multiplanar reconstructions were obtained. CT dose reduction was achieved through use of a standardized protocol tailored for this examination and automatic exposure control for dose modulation. FINDINGS: The ventricles and cortical sulci are within normal limits. No evidence for acute intracranial hemorrhage, midline shift, or mass effect. Mild to moderate bilateral subcortical and periventricular areas of patchy low attenuation is nonspecific but likely related to changes of chronic small vessel ischemic disease. The basal cisterns are patent. Mucosal thickening in bilateral ethmoidal air cells and maxillary sinuses without air-fluid level. No calvarial abnormality. IMPRESSION: No evidence for acute intracranial abnormality. Mild to moderate sinus disease      Ct Spine Cerv Wo Cont    Result Date: 12/27/2018  EXAM:  CT CERVICAL SPINE WITHOUT CONTRAST INDICATION: Status post fall, left frontal bruising. COMPARISON: None. CONTRAST:  None. TECHNIQUE: Multislice helical CT of the cervical spine was performed without intravenous contrast administration.   Sagittal and coronal reconstructions were generated. CT dose reduction was achieved through use of a standardized protocol tailored for this examination and automatic exposure control for dose modulation. FINDINGS: The alignment is within normal limits. There is an acute mild compression fracture of T1. The odontoid process is intact. The craniocervical junction is within normal limits. The prevertebral soft tissues are within normal limits. C2-C3: There is no spinal canal or neural foraminal stenosis. C3-C4: There is no spinal canal or neural foraminal stenosis. C4-C5: There is no spinal canal or neural foraminal stenosis. C5-C6: There is no spinal canal or neural foraminal stenosis. C6-C7: Spondylosis is noted at this level without spinal stenosis. . C7-T1: There is no spinal canal or neural foraminal stenosis. IMPRESSION: Acute mild T1 compression fracture. Multilevel facet degenerative changes. Spondylosis facet degenerative changes are noted at multiple levels. C6-7. The findings were called to Marcelle Magana on 12/27/2018 at 12 noon by myself.  [place]:  Medications   morphine injection 2 mg (2 mg IntraVENous Given 12/27/18 1431)   sodium chloride (NS) flush 5-10 mL (10 mL IntraVENous Given 12/27/18 1431)   sodium chloride (NS) flush 5-10 mL (not administered)   cefTRIAXone (ROCEPHIN) 1 g in 0.9% sodium chloride (MBP/ADV) 50 mL (not administered)   dextrose 5 % - 0.45% NaCl infusion (100 mL/hr IntraVENous New Bag 12/27/18 1344)   HYDROcodone-acetaminophen (NORCO) 5-325 mg per tablet 1 Tab (not administered)   vancomycin (VANCOCIN) 1,000 mg in 0.9% sodium chloride (MBP/ADV) 250 mL (not administered)   acetaminophen (TYLENOL) tablet 650 mg (not administered)   albuterol (PROVENTIL VENTOLIN) nebulizer solution 2.5 mg (not administered)   atorvastatin (LIPITOR) tablet 40 mg (40 mg Oral Given 12/27/18 1435)   donepezil (ARICEPT) tablet 10 mg (not administered)   pantoprazole (PROTONIX) tablet 40 mg (40 mg Oral Given 12/27/18 1435)   isosorbide mononitrate ER (IMDUR) tablet 30 mg (30 mg Oral Given 12/27/18 1435)   lacosamide (VIMPAT) tablet 100 mg (not administered)   memantine (NAMENDA) tablet 10 mg (not administered)   metoprolol tartrate (LOPRESSOR) tablet 12.5 mg (not administered)   oxybutynin chloride XL (DITROPAN XL) tablet 10 mg (not administered)   valproic acid (DEPAKENE) capsule 500 mg (not administered)   fentaNYL citrate (PF) injection 25 mcg (25 mcg IntraVENous Given 12/27/18 1207)   sodium chloride 0.9 % bolus infusion 500 mL (0 mL IntraVENous IV Completed 12/27/18 1208)   cefTRIAXone (ROCEPHIN) 1 g in sterile water (preservative free) 10 mL IV syringe (1 g IntraVENous Given 12/27/18 1224)         CLINICAL IMPRESSION:  1. Fall, initial encounter    2. Closed fracture of left hip, initial encounter ([facility] [address]    3. Urinary tract infection without hematuria, site unspecified    4. Elevated troponin    5. [facility] thoracic [facility]          Plan  1. Admission per Dr. Rose June      12:35 PM  The patient is being admitted to the hospital.  The results of their tests and reasons for their admission have been discussed with them and/or available family. The patient/family has conveyed agreement and understanding for the need to be admitted and for their admission diagnosis. Consultation has been made with the inpatient physician specialist for hospitalization.

## 2018-12-27 NOTE — CONSULTS
ORTHO CONSULT    Subjective:     Date of Consultation:  December 27, 2018    Referring Physician:  POLO Anaya    Kai Cardoza is a 68 y.o. male we are consulted to see for L Femoral Neck Fracture. Pt. Lives at home with his wife, one level, uses walker. Multiple falls the last 2 weeks. Wife states he is basically bedridden at home. Pt. Denies other injury. No numbness in LLE. Patient Active Problem List    Diagnosis Date Noted    Hip fracture ([address] 12/27/2018    Thrombocytopenia ([address] 12/27/2018    Anxiety and depression 12/27/2018    BPH (benign prostatic hyperplasia) 12/27/2018    GERD (gastroesophageal reflux disease) 12/27/2018    HTN (hypertension) 12/27/2018    Seizure ([address] 12/27/2018    UTI (urinary tract infection) 12/27/2018    Hypernatremia 12/27/2018    Acute on chronic renal failure ([address] 12/27/2018    Closed displaced fracture of left femoral neck ([address] 12/27/2018    Fall 12/27/2018    Dementia 12/27/2018    Trimalleolar fracture of right ankle 10/24/2012     History reviewed. No pertinent family history. Social History     Tobacco Use    Smoking status: Never Smoker    Smokeless tobacco: Never Used   Substance Use Topics    Alcohol use: No     Past Medical History:   Diagnosis Date    Anxiety     BPH (benign prostatic hyperplasia)     Depression     GERD (gastroesophageal reflux disease)     Hypertension     Seizures (HCC)       Past Surgical History:   Procedure Laterality Date    HX COLONOSCOPY      HX HEENT      nasal septum    [place] EGD DELIVER THERMAL ENERGY SPHNCTR/CARDIA GERD        Prior to Admission medications    Medication Sig Start Date End Date Taking? Authorizing Provider   atorvastatin (LIPITOR) 40 mg tablet Take 40 mg by mouth daily. Yes Provider, Historical   metoprolol tartrate (LOPRESSOR) 25 mg tablet Take 12.5 mg by mouth two (2) times a day. Yes Provider, Historical   isosorbide mononitrate ER (IMDUR) 30 mg tablet Take 30 mg by mouth daily. Yes Provider, Historical   aspirin delayed-release 81 mg tablet Take 81 mg by mouth daily. Yes Provider, Historical   lacosamide (VIMPAT) 100 mg tab tablet Take 100 mg by mouth two (2) times a day. Yes Provider, Historical   valproic acid (DEPAKENE) 250 mg capsule Take 500 mg by mouth every twelve (12) hours. Yes Provider, Historical   oxybutynin chloride XL (DITROPAN XL) 10 mg CR tablet Take 10 mg by mouth daily. Yes Provider, Historical   acetaminophen (TYLENOL) 325 mg tablet Take 650 mg by mouth every six (6) hours as needed for Pain. Yes Provider, Historical   albuterol (PROVENTIL VENTOLIN) 2.5 mg /3 mL (0.083 %) nebulizer solution 2.5 mg by Nebulization route every four (4) hours as needed for Wheezing or Shortness of Breath. Yes Provider, Historical   memantine (NAMENDA) 10 mg tablet Take 10 mg by mouth nightly. Yes Other, MD Natalya   donepezil (ARICEPT) 10 mg tablet Take 10 mg by mouth nightly. Yes Gideon, MD Natalya   esomeprazole (NEXIUM) 40 mg capsule Take 40 mg by mouth daily. Yes Gideon, MD Natalya   budesonide-formoterol (SYMBICORT) 160-4.5 mcg/actuation HFA inhaler Take 2 Puffs by inhalation two (2) times a day. Yes Gideon, MD Natalya     Current Facility-Administered Medications   Medication Dose Route Frequency    morphine injection 2 mg  2 mg IntraVENous Q4H PRN    [START ON 12/28/2018] cefTRIAXone (ROCEPHIN) 1 g in sterile water (preservative free) 10 mL IV syringe  1 g IntraVENous Q24H     Current Outpatient Medications   Medication Sig    atorvastatin (LIPITOR) 40 mg tablet Take 40 mg by mouth daily.  metoprolol tartrate (LOPRESSOR) 25 mg tablet Take 12.5 mg by mouth two (2) times a day.  isosorbide mononitrate ER (IMDUR) 30 mg tablet Take 30 mg by mouth daily.  aspirin delayed-release 81 mg tablet Take 81 mg by mouth daily.  lacosamide (VIMPAT) 100 mg tab tablet Take 100 mg by mouth two (2) times a day.     valproic acid (DEPAKENE) 250 mg capsule Take 500 mg by mouth every twelve (12) hours.  oxybutynin chloride XL (DITROPAN XL) 10 mg CR tablet Take 10 mg by mouth daily.  acetaminophen (TYLENOL) 325 mg tablet Take 650 mg by mouth every six (6) hours as needed for Pain.  albuterol (PROVENTIL VENTOLIN) 2.5 mg /3 mL (0.083 %) nebulizer solution 2.5 mg by Nebulization route every four (4) hours as needed for Wheezing or Shortness of Breath.  memantine (NAMENDA) 10 mg tablet Take 10 mg by mouth nightly.  donepezil (ARICEPT) 10 mg tablet Take 10 mg by mouth nightly.  esomeprazole (NEXIUM) 40 mg capsule Take 40 mg by mouth daily.  budesonide-formoterol (SYMBICORT) 160-4.5 mcg/actuation HFA inhaler Take 2 Puffs by inhalation two (2) times a day. Allergies   Allergen Reactions    Augmentin [Amoxicillin-Pot Clavulanate] Rash        Review of Systems:  A comprehensive review of systems was negative except for that written in the HPI. Objective:     Visit Vitals  BP (!) 112/91   Pulse 97   Temp 98.2 °F (36.8 °C)   Resp 18   Ht 6' (1.829 m)   Wt 82.6 kg (182 lb)   SpO2 98%   BMI 24.68 kg/m²       EXAM: Some dementia noted on exam. I examined Pt's L hip. + pain to palpation of the L trochanteric bursa on exam. + groin pain with rotation of the hip. NVI distally BLE's. Skin intact    XR Results (most recent):    XR PELV AP ONLY (Final result)   Result time 12/27/18 11:19:24   Procedure changed from XR HIP LT W OR WO PELV 2-3 VWS   Final result                Impression:    IMPRESSION: Left femoral neck fracture with slight superior displacement. Narrative:    EXAM: XR PELV AP ONLY    INDICATION: fall, left hip pain. COMPARISON: None. FINDINGS: An AP view of the pelvis and a frogleg lateral view of the left hip  demonstrate a left femoral neck fracture with slight superior displacement of  the distal femoral fracture fragment. Assessment/Plan:     Encounter Diagnoses     ICD-10-CM ICD-9-CM   1.  Fall, initial encounter W19.XXXA E888.9   2. Closed fracture of left hip, initial encounter ([facility] S72.002A 820.8   3. Urinary tract infection without hematuria, site unspecified N39.0 599.0   4. Elevated troponin R74.8 790.6   5. Closed compression fracture of thoracic vertebra, initial encounter ([facility] S22.000A 805.2     Appreciate Hospitalist admission, medical management and medical clearance. Pt. Is not cleared for surgery at this time. Requires Cardiology clearance due to elevated Trop. Plan for OR for tomorrow if cleared by Hospitalist and Cardiology. NPO after MN . Case not posted yet. Dr. Taiwo Rojas agrees with plan. And will contact hip surgeon to take over case. Labs reveal acute on chronic Renal failure POA (Cr. 2.64) and Dehydration. Anticipate surgery and hospitalization to be further challenge to Renal function. Labs reveal Anemia on admission with HGB at 11 . Anticipate possible blood transfusion during hospitalization with associated L hip surgery. Heparin 5000u SQ given once in ER. Hold anticoagulants until post op. Resume Hep post op instead of Lov due to renal fx. Pt. Was screened for increased DVT risk and found to be high risk due to immobility and down time on floor. There is identified need for US Dopplars of BLE's. UTI POA. Thank you for allowing us to take part in this patients care.     Shelby Maciel PA-C  [facility]  [address], [facility]    Orthopaedic Surgery [facility]

## 2018-12-27 NOTE — ED TRIAGE NOTES
Pt arrives from [address] with c/o of ground level fall that occurred last evening, per pt pt slipped and fell in the bathroom and was calling for nursing staff, pt unknown if passed out, unwitnessed fall, pt complaining of left hip pain, pt has bruising to left frontal forehead.

## 2018-12-27 NOTE — ED NOTES
TRANSFER - OUT REPORT:    Verbal report given to Bhavin Herzog (name) on Fela Saini  being transferred to [facility](unit) for routine progression of care       Report consisted of patients Situation, Background, Assessment and   Recommendations(SBAR). Information from the following report(s) SBAR, Kardex and ED Summary was reviewed with the receiving nurse. Lines:   Peripheral IV 12/27/18 Left Antecubital (Active)   Site Assessment Clean, dry, & intact 12/27/2018 11:36 AM   Phlebitis Assessment 0 12/27/2018 11:36 AM   Infiltration Assessment 0 12/27/2018 11:36 AM   Dressing Status Clean, dry, & intact 12/27/2018 11:36 AM   Hub Color/Line Status Pink 12/27/2018 11:36 AM        Opportunity for questions and clarification was provided.       Patient transported with:   Celia Keys RN

## 2018-12-27 NOTE — PROCEDURES
[facility]  *** FINAL REPORT ***    Name: Zaid Gil  MRN: [id number]    Inpatient  : 30 May 1945  HIS Order #: [id number]  [address] [facility] Visit #: [id number]  Date: 27 Dec 2018    TYPE OF TEST: Peripheral Venous Testing    REASON FOR TEST  Pain in limb, Shortness of breath    Right Leg:-  Deep venous thrombosis:           Yes  Proximal extent of thrombus:      Popliteal At The Knee  Superficial venous thrombosis:    No  Deep venous insufficiency:        No  Superficial venous insufficiency: Not examined    Left Leg:-  Deep venous thrombosis:           Yes  Proximal extent of thrombus:      Gastrocnemius  Superficial venous thrombosis:    No  Deep venous insufficiency:        No  Superficial venous insufficiency: Not examined      INTERPRETATION/FINDINGS  PROCEDURE:  BILATERAL LE VENOUS DUPLEX. Evaluation of lower extremity veins with ultrasound (B-mode imaging,  pulsed Doppler, color Doppler). Includes the common femoral, deep  femoral, femoral, popliteal, posterior tibial, peroneal, and great  saphenous veins. FINDINGS: Technically difficult study due to patient being combative  secondary to dimentia. Acute partially occlusive thrombus noted in the right  popliteal(fossa), popliteal(below knee) veins and acute occlusive  thrombus noted in the right peroneal veins and left  gastrocnemius  veins. Gray scale and color flow duplex images of the remaining veins  in both lower extremities demonstrate normal compressibility,  spontaneous and augmented flow profiles, and absence of filling  defects throughout the deep and superficial veins in both lower  extremities. CONCLUSION: Right lower extremity venous duplex positive for deep  venous thrombosis involving the popliteal and peroneal veins. Left  lower extremity venous duplex positive for deep venous thrombosis  involving the gastrocnemius veins.     ADDITIONAL COMMENTS    I have personally reviewed the data relevant to the interpretation of  this  study. TECHNOLOGIST: Mac Villalta RDCS  Signed: 12/27/2018 3:11:18 PM    PHYSICIAN: Roderick Almaguer MD  Signed: 12/28/2018 9:36:42 AM

## 2018-12-27 NOTE — PROGRESS NOTES
Called Mary CUELLAR, reviewed Duplex study results re: DVT's. Informed that off going RN has spoken with Dr. Serenity Hodges group. Reviewed off going RN's note with PA. No new orders at this time. Pt is scheduled for transport to surgery at 0530 tomorrow.

## 2018-12-27 NOTE — PROGRESS NOTES
Spoke with Dr. Osiris Lester regarding the result of patient's duplex lower extremities.  He requested we contact ortho for their reccommedation since patient will be going to the OR in the AM.

## 2018-12-28 ENCOUNTER — ANESTHESIA EVENT (OUTPATIENT)
Dept: SURGERY | Age: 73
DRG: 470 | End: 2018-12-28
Payer: MEDICARE

## 2018-12-28 ENCOUNTER — APPOINTMENT (OUTPATIENT)
Dept: INTERVENTIONAL RADIOLOGY/VASCULAR | Age: 73
DRG: 470 | End: 2018-12-28
Attending: PHYSICIAN ASSISTANT
Payer: MEDICARE

## 2018-12-28 LAB
ANION GAP SERPL CALC-SCNC: 9 MMOL/L (ref 5–15)
ATRIAL RATE: 115 BPM
BUN SERPL-MCNC: 27 MG/DL (ref 6–20)
BUN/CREAT SERPL: 11 (ref 12–20)
CALCIUM SERPL-MCNC: 9.1 MG/DL (ref 8.5–10.1)
CALCULATED P AXIS, ECG09: 44 DEGREES
CALCULATED R AXIS, ECG10: 80 DEGREES
CALCULATED T AXIS, ECG11: 47 DEGREES
CHLORIDE SERPL-SCNC: 115 MMOL/L (ref 97–108)
CO2 SERPL-SCNC: 27 MMOL/L (ref 21–32)
CREAT SERPL-MCNC: 2.36 MG/DL (ref 0.7–1.3)
DIAGNOSIS, 93000: NORMAL
ERYTHROCYTE [DISTWIDTH] IN BLOOD BY AUTOMATED COUNT: 14.3 % (ref 11.5–14.5)
GLUCOSE SERPL-MCNC: 126 MG/DL (ref 65–100)
HCT VFR BLD AUTO: 35.3 % (ref 36.6–50.3)
HGB BLD-MCNC: 11 G/DL (ref 12.1–17)
MCH RBC QN AUTO: 28.6 PG (ref 26–34)
MCHC RBC AUTO-ENTMCNC: 31.2 G/DL (ref 30–36.5)
MCV RBC AUTO: 91.9 FL (ref 80–99)
NRBC # BLD: 0 K/UL (ref 0–0.01)
NRBC BLD-RTO: 0 PER 100 WBC
P-R INTERVAL, ECG05: 142 MS
PLATELET # BLD AUTO: 134 K/UL (ref 150–400)
PMV BLD AUTO: 11.7 FL (ref 8.9–12.9)
POTASSIUM SERPL-SCNC: 4 MMOL/L (ref 3.5–5.1)
Q-T INTERVAL, ECG07: 358 MS
QRS DURATION, ECG06: 126 MS
QTC CALCULATION (BEZET), ECG08: 495 MS
RBC # BLD AUTO: 3.84 M/UL (ref 4.1–5.7)
SODIUM SERPL-SCNC: 151 MMOL/L (ref 136–145)
VENTRICULAR RATE, ECG03: 115 BPM
WBC # BLD AUTO: 8.6 K/UL (ref 4.1–11.1)

## 2018-12-28 PROCEDURE — C1769 GUIDE WIRE: HCPCS

## 2018-12-28 PROCEDURE — 06H03DZ INSERTION OF INTRALUMINAL DEVICE INTO INFERIOR VENA CAVA, PERCUTANEOUS APPROACH: ICD-10-PCS | Performed by: RADIOLOGY

## 2018-12-28 PROCEDURE — 77010033678 HC OXYGEN DAILY

## 2018-12-28 PROCEDURE — C1880 VENA CAVA FILTER: HCPCS

## 2018-12-28 PROCEDURE — 74011250637 HC RX REV CODE- 250/637: Performed by: INTERNAL MEDICINE

## 2018-12-28 PROCEDURE — 74011000258 HC RX REV CODE- 258: Performed by: INTERNAL MEDICINE

## 2018-12-28 PROCEDURE — 74011250636 HC RX REV CODE- 250/636: Performed by: NURSE PRACTITIONER

## 2018-12-28 PROCEDURE — 74011250636 HC RX REV CODE- 250/636: Performed by: RADIOLOGY

## 2018-12-28 PROCEDURE — 74011636320 HC RX REV CODE- 636/320: Performed by: RADIOLOGY

## 2018-12-28 PROCEDURE — 36415 COLL VENOUS BLD VENIPUNCTURE: CPT

## 2018-12-28 PROCEDURE — 65270000029 HC RM PRIVATE

## 2018-12-28 PROCEDURE — 94760 N-INVAS EAR/PLS OXIMETRY 1: CPT

## 2018-12-28 PROCEDURE — 37191 INS ENDOVAS VENA CAVA FILTR: CPT

## 2018-12-28 PROCEDURE — 85027 COMPLETE CBC AUTOMATED: CPT

## 2018-12-28 PROCEDURE — 80048 BASIC METABOLIC PNL TOTAL CA: CPT

## 2018-12-28 PROCEDURE — 77030029065 HC DRSG HEMO QCLOT ZMED -B

## 2018-12-28 PROCEDURE — 74011250637 HC RX REV CODE- 250/637: Performed by: PHYSICIAN ASSISTANT

## 2018-12-28 PROCEDURE — 74011250636 HC RX REV CODE- 250/636: Performed by: INTERNAL MEDICINE

## 2018-12-28 PROCEDURE — C1894 INTRO/SHEATH, NON-LASER: HCPCS

## 2018-12-28 RX ORDER — HEPARIN SODIUM 5000 [USP'U]/ML
5000 INJECTION, SOLUTION INTRAVENOUS; SUBCUTANEOUS ONCE
Status: COMPLETED | OUTPATIENT
Start: 2018-12-28 | End: 2018-12-28

## 2018-12-28 RX ORDER — HEPARIN SODIUM 200 [USP'U]/100ML
500 INJECTION, SOLUTION INTRAVENOUS ONCE
Status: DISCONTINUED | OUTPATIENT
Start: 2018-12-28 | End: 2018-12-28 | Stop reason: HOSPADM

## 2018-12-28 RX ORDER — LIDOCAINE HYDROCHLORIDE 10 MG/ML
20 INJECTION INFILTRATION; PERINEURAL
Status: DISCONTINUED | OUTPATIENT
Start: 2018-12-28 | End: 2018-12-28 | Stop reason: HOSPADM

## 2018-12-28 RX ORDER — FENTANYL CITRATE 50 UG/ML
25-200 INJECTION, SOLUTION INTRAMUSCULAR; INTRAVENOUS
Status: DISCONTINUED | OUTPATIENT
Start: 2018-12-28 | End: 2018-12-28 | Stop reason: HOSPADM

## 2018-12-28 RX ADMIN — DONEPEZIL HYDROCHLORIDE 10 MG: 5 TABLET, FILM COATED ORAL at 21:20

## 2018-12-28 RX ADMIN — FENTANYL CITRATE 50 MCG: 50 INJECTION, SOLUTION INTRAMUSCULAR; INTRAVENOUS at 11:25

## 2018-12-28 RX ADMIN — LIDOCAINE HYDROCHLORIDE 20 ML: 10 INJECTION, SOLUTION INFILTRATION; PERINEURAL at 11:21

## 2018-12-28 RX ADMIN — ISOSORBIDE MONONITRATE 30 MG: 30 TABLET, EXTENDED RELEASE ORAL at 07:59

## 2018-12-28 RX ADMIN — VALPROIC ACID 500 MG: 250 CAPSULE, LIQUID FILLED ORAL at 08:06

## 2018-12-28 RX ADMIN — FENTANYL CITRATE 50 MCG: 50 INJECTION, SOLUTION INTRAMUSCULAR; INTRAVENOUS at 11:15

## 2018-12-28 RX ADMIN — MEMANTINE 10 MG: 10 TABLET ORAL at 21:20

## 2018-12-28 RX ADMIN — CEFTRIAXONE SODIUM 1 G: 1 INJECTION, POWDER, FOR SOLUTION INTRAMUSCULAR; INTRAVENOUS at 12:11

## 2018-12-28 RX ADMIN — HEPARIN SODIUM 5000 UNITS: 5000 INJECTION INTRAVENOUS; SUBCUTANEOUS at 16:06

## 2018-12-28 RX ADMIN — OXYBUTYNIN CHLORIDE 10 MG: 5 TABLET, EXTENDED RELEASE ORAL at 08:00

## 2018-12-28 RX ADMIN — METOPROLOL TARTRATE 12.5 MG: 25 TABLET ORAL at 08:01

## 2018-12-28 RX ADMIN — ATORVASTATIN CALCIUM 40 MG: 20 TABLET, FILM COATED ORAL at 07:59

## 2018-12-28 RX ADMIN — Medication 10 ML: at 21:20

## 2018-12-28 RX ADMIN — METOPROLOL TARTRATE 12.5 MG: 25 TABLET ORAL at 16:06

## 2018-12-28 RX ADMIN — IOPAMIDOL 15 ML: 755 INJECTION, SOLUTION INTRAVENOUS at 11:35

## 2018-12-28 RX ADMIN — LACOSAMIDE 100 MG: 100 TABLET, FILM COATED ORAL at 08:01

## 2018-12-28 RX ADMIN — PANTOPRAZOLE SODIUM 40 MG: 40 TABLET, DELAYED RELEASE ORAL at 08:00

## 2018-12-28 RX ADMIN — MORPHINE SULFATE 2 MG: 4 INJECTION, SOLUTION INTRAMUSCULAR; INTRAVENOUS at 07:57

## 2018-12-28 RX ADMIN — DEXTROSE MONOHYDRATE AND SODIUM CHLORIDE 100 ML/HR: 5; .45 INJECTION, SOLUTION INTRAVENOUS at 20:35

## 2018-12-28 RX ADMIN — HYDROCODONE BITARTRATE AND ACETAMINOPHEN 1 TABLET: 5; 325 TABLET ORAL at 07:57

## 2018-12-28 RX ADMIN — LACOSAMIDE 100 MG: 100 TABLET, FILM COATED ORAL at 16:06

## 2018-12-28 RX ADMIN — VALPROIC ACID 500 MG: 250 CAPSULE, LIQUID FILLED ORAL at 21:20

## 2018-12-28 NOTE — PROGRESS NOTES
Bedside and Verbal shift change report given to Jasmyne Vinson (oncoming nurse) by Brooke Escalera (offgoing nurse). Report included the following information SBAR, Kardex, Intake/Output, MAR and Recent Results.

## 2018-12-28 NOTE — PROGRESS NOTES
12/28/2018 2:39 PM List of alternative SNFs provided to pt's wife to select backups if [facility] unable to accept pt back. 12/28/2018 11:02 AM Spoke with Rowan Das in admissions at [facility], pt's wife has cancelled her bed hold with their facility. Rowan Das reported CM can send over updates on 12/31 to be reviewed. CM will follow. 12/28/2018  8:51 AM EMR reviewed. Pt to have IVC filter placed today and surgery with Ortho on 12/29. Spoke with Ortho PA and pt's attending regarding pt's discharge. Pt is to be on heparin 5 days post op, pt's attending reported pt can do this at SNF. Pt's attending reported pending medical stability and surgery, tentative discharge on 12/31. CM called and lvm with [facility] regarding pt's return to their facility potentially on 12/31. CM will follow. REGULO WilkinsW

## 2018-12-28 NOTE — PROGRESS NOTES
Received a call from [address] [place] [address] with abnormal Troponin results: 0.17. Dr. Hernandez How Anesthesia on the unit to see pt. Reports that surgery is set for 0630 tomorrow. MD reviewed current anticoagulants with RN and current Troponin level. Updated on pt's duplex results. Md reports that he will check for any new orders re: DVT's. Called Dr. Tenna Lennox office to report no change in troponin level. Answering service reports that Dr. Oleg Brownlee will call back. 2005  Received a call from Dr. Oleg Brownlee. Informed that there is no change in Troponin level: 0.17. Md reports that no further Troponin levels need to be drawn. 2105  Paged Himanshu Morris for follow up re: dvts and anesthesia is requesting to know if there are any changes re: am surgery. 2115  Received a call back from Himanshu CUELLAR, reports that am surgery will be canceled for placement of chris placement tomorrow. Supervisor reports that she will notify and cancel surgical team for am.  New orders received for Heparin 5,000 units sq x1. Continue NPO s/p 12mn. 2122  Received a call from Dr. Halie Stevenson. Updated RN on information and orders received from PA. MD reports that he will call pt's wife tonight and update her on changes. Given numbers from demographics.

## 2018-12-28 NOTE — PROGRESS NOTES
Edgar Suero MD, Vlad [address] 
[phone] IMPRESSION and RECOMMENDATIONS 1.  CAD:  S/P Fall resulting in left hip Fx. Tentatively for repair today. No active cardiac symptoms, non-ischemic EKG, minimally abnormal troponin. I suspect that his minimally abnormal troponin is due to supply-demand mismatch in a patient with tachycardia due to pain and a chronically occluded LCx by cath (3/2017). Of note, echo 5/4/18 revealed normal LVF (EF 55-60%).  
  
Continue metoprolol, lipitor, imdur. Resume ASA when feasible. Will see prn over the holiday weekend, but Dr. Dejah Michel is on call should issues arise. 
  
Abel Wilder) Subjective:  
 
 
Pt feeling better. No hip pain this AM. Objective:  
 
Patient Vitals for the past 16 hrs: 
 BP Temp Pulse Resp SpO2  
12/28/18 0721 111/82 99.8 °F (37.7 °C) (!) 110 17 93 % 12/28/18 0359 141/89 99 °F (37.2 °C) (!) 109 17 90 % 12/27/18 2318 124/77 99 °F (37.2 °C) 100 16 92 % 12/27/18 1932 115/75 98.3 °F (36.8 °C) 87 16 94 % HEENT Exam:   
 WNL Lung Exam:   
 The patient is not dyspneic. Breath sounds are heard equally in all lung fields. There are no wheezes, rales, rhonchi, or rubs heard on auscultation. Heart Exam: The rhythm is regular. The PMI is in the 5th intercostal space of the MCL. Apical impulse is normal. S1 is regular. S2 is physiologic. There is no S3, S4 gallop, murmur, click, or rub. Abdomen Exam:   
 Benign. Extremities Exam: No cyanosis, clubbing, edema. Distal pulses intact. Lab Results Component Value Date/Time  Glucose 126 (H) 12/28/2018 04:00 AM  
 Sodium 151 (H) 12/28/2018 04:00 AM  
 Potassium 4.0 12/28/2018 04:00 AM  
 Chloride 115 (H) 12/28/2018 04:00 AM  
 CO2 27 12/28/2018 04:00 AM  
 BUN 27 (H) 12/28/2018 04:00 AM  
 Creatinine 2.36 (H) 12/28/2018 04:00 AM  
 Calcium 9.1 12/28/2018 04:00 AM  
 
 Recent Labs  
  12/28/18 
0400 12/27/18 
1120 WBC 8.6 10.0 HGB 11.0* 11.0*  
HCT 35.3* 35.5* * 146* Recent Labs  
  12/27/18 
1120 SGOT 17 ALT 16  
AP 73 TBILI 0.2 TP 6.2* ALB 2.3*  
GLOB 3.9 No results for input(s): INR, PTP, APTT in the last 72 hours. No lab exists for component: INREXT Recent Labs  
  12/27/18 
1120  Recent Labs  
  12/27/18 
1824 TROIQ 0.17* Lab Results Component Value Date/Time  Cholesterol, total 185 10/06/2009 12:48 PM  
 HDL Cholesterol 54 10/06/2009 12:48 PM  
 LDL, calculated 116.8 (H) 10/06/2009 12:48 PM  
 Triglyceride 71 10/06/2009 12:48 PM  
 CHOL/HDL Ratio 3.4 10/06/2009 12:48 PM

## 2018-12-28 NOTE — CONSULTS
ORTHOVA CONSULT    Subjective:     Date of Consultation:  December 28, 2018    Referring Physician:  POLO James    Mary Grace Rodriguez is a 68 y.o. male we are consulted to see for L Femoral Neck Fracture. Pt. Lives at home with his wife, one level, uses walker. Multiple falls the last 2 weeks was currently admitted to [address] [place] [address] 2/2 falls. Wife states he is basically bedridden at home. Pt. denies other injury. No numbness in LLE. On workup in ER noted to have elevated Gypsy which have been trended and stable and cleared from cardiac stand point for OR. Also found to have bilateral LE DVTs one above the knee. Patient Active Problem List    Diagnosis Date Noted    Hip fracture ([address] 12/27/2018    Thrombocytopenia ([address] 12/27/2018    Anxiety and depression 12/27/2018    BPH (benign prostatic hyperplasia) 12/27/2018    GERD (gastroesophageal reflux disease) 12/27/2018    HTN (hypertension) 12/27/2018    Seizure ([address] 12/27/2018    UTI (urinary tract infection) 12/27/2018    Hypernatremia 12/27/2018    Acute on chronic renal failure ([address] 12/27/2018    Closed displaced fracture of left femoral neck ([address] 12/27/2018    Fall 12/27/2018    Dementia 12/27/2018    T1 vertebral fracture ([address] 12/27/2018    Trimalleolar fracture of right ankle 10/24/2012     History reviewed. No pertinent family history. Social History     Tobacco Use    Smoking status: Never Smoker    Smokeless tobacco: Never Used   Substance Use Topics    Alcohol use: No     Past Medical History:   Diagnosis Date    Anxiety     BPH (benign prostatic hyperplasia)     Depression     GERD (gastroesophageal reflux disease)     Hypertension     Seizures (HCC)       Past Surgical History:   Procedure Laterality Date    HX COLONOSCOPY      HX HEENT      nasal septum    [place] EGD DELIVER THERMAL ENERGY SPHNCTR/CARDIA GERD        Prior to Admission medications    Medication Sig Start Date End Date Taking?  Authorizing Provider atorvastatin (LIPITOR) 40 mg tablet Take 40 mg by mouth daily. Yes Provider, Historical   metoprolol tartrate (LOPRESSOR) 25 mg tablet Take 12.5 mg by mouth two (2) times a day. Yes Provider, Historical   isosorbide mononitrate ER (IMDUR) 30 mg tablet Take 30 mg by mouth daily. Yes Provider, Historical   aspirin delayed-release 81 mg tablet Take 81 mg by mouth daily. Yes Provider, Historical   lacosamide (VIMPAT) 100 mg tab tablet Take 100 mg by mouth two (2) times a day. Yes Provider, Historical   valproic acid (DEPAKENE) 250 mg capsule Take 500 mg by mouth every twelve (12) hours. Yes Provider, Historical   oxybutynin chloride XL (DITROPAN XL) 10 mg CR tablet Take 10 mg by mouth daily. Yes Provider, Historical   acetaminophen (TYLENOL) 325 mg tablet Take 650 mg by mouth every six (6) hours as needed for Pain. Yes Provider, Historical   albuterol (PROVENTIL VENTOLIN) 2.5 mg /3 mL (0.083 %) nebulizer solution 2.5 mg by Nebulization route every four (4) hours as needed for Wheezing or Shortness of Breath. Yes Provider, Historical   memantine (NAMENDA) 10 mg tablet Take 10 mg by mouth nightly. Yes Other, MD Natalya   donepezil (ARICEPT) 10 mg tablet Take 10 mg by mouth nightly. Yes Gideon, MD Natalya   esomeprazole (NEXIUM) 40 mg capsule Take 40 mg by mouth daily. Yes Gideon, MD Natalya   budesonide-formoterol (SYMBICORT) 160-4.5 mcg/actuation HFA inhaler Take 2 Puffs by inhalation two (2) times a day.      Yes Gideon, MD Natalya     Current Facility-Administered Medications   Medication Dose Route Frequency    morphine injection 2 mg  2 mg IntraVENous Q4H PRN    sodium chloride (NS) flush 5-10 mL  5-10 mL IntraVENous Q8H    sodium chloride (NS) flush 5-10 mL  5-10 mL IntraVENous PRN    cefTRIAXone (ROCEPHIN) 1 g in 0.9% sodium chloride (MBP/ADV) 50 mL  1 g IntraVENous Q24H    dextrose 5 % - 0.45% NaCl infusion  100 mL/hr IntraVENous CONTINUOUS    HYDROcodone-acetaminophen (NORCO) 5-325 mg per tablet 1 Tab  1 Tab Oral Q6H PRN    acetaminophen (TYLENOL) tablet 650 mg  650 mg Oral Q6H PRN    albuterol (PROVENTIL VENTOLIN) nebulizer solution 2.5 mg  2.5 mg Nebulization Q4H PRN    atorvastatin (LIPITOR) tablet 40 mg  40 mg Oral DAILY    donepezil (ARICEPT) tablet 10 mg  10 mg Oral QHS    pantoprazole (PROTONIX) tablet 40 mg  40 mg Oral DAILY    isosorbide mononitrate ER (IMDUR) tablet 30 mg  30 mg Oral DAILY    lacosamide (VIMPAT) tablet 100 mg  100 mg Oral BID    memantine (NAMENDA) tablet 10 mg  10 mg Oral QHS    metoprolol tartrate (LOPRESSOR) tablet 12.5 mg  12.5 mg Oral BID    oxybutynin chloride XL (DITROPAN XL) tablet 10 mg  10 mg Oral DAILY    valproic acid (DEPAKENE) capsule 500 mg  500 mg Oral Q12H     Allergies   Allergen Reactions    Augmentin [Amoxicillin-Pot Clavulanate] Rash        Review of Systems:  A comprehensive review of systems was negative except for that written in the HPI. Objective:     Visit Vitals  /89 (BP 1 Location: Right arm, BP Patient Position: At rest)   Pulse (!) 109   Temp 99 °F (37.2 °C)   Resp 17   Ht 6' (1.829 m)   Wt 82.6 kg (182 lb)   SpO2 90%   BMI 24.68 kg/m²       EXAM:   GEN: Thin cacectic male in NAD A&Ox2 but some confusion/ dementia noted on exam.   RESP: Nonlaborred breathing  CARD: RRR, good peripheral perfusion  LLE: laying on the hip in a fetal position, leg min shortened and ER.pain with log roll. +EHL/FHL, DF/PF ankle. SILT all dermatomes grossly DP2+    XR Results (most recent):    XR PELV AP ONLY (Final result)   Result time 12/27/18 11:19:24   Procedure changed from XR HIP LT W OR WO PELV 2-3 VWS   Final result                Impression:    IMPRESSION: Left femoral neck fracture with slight superior displacement. Narrative:    EXAM: XR PELV AP ONLY    INDICATION: fall, left hip pain. COMPARISON: None.     FINDINGS: An AP view of the pelvis and a frogleg lateral view of the left hip  demonstrate a left femoral neck fracture with slight superior displacement of  the distal femoral fracture fragment. BLE DVTs: L gastroc and R popliteal and peroneal veins      Assessment/Plan:     Encounter Diagnoses     ICD-10-CM ICD-9-CM   1. Fall, initial encounter [address] XXXA E888.9   2. Closed fracture of left hip, initial encounter ([facility] S72.002A 820.8   3. Urinary tract infection without hematuria, site unspecified N39.0 599.0   4. Elevated troponin R74.8 790.6   5. Closed compression fracture of thoracic vertebra, initial encounter ([facility] S22.000A 805.2   BLE DVTs  - admit medicine, defer medical management and appreciate help in care  - Gypsy: cards consulted and cleared patient with stable Gypsy  - acute on chronic Renal failure POA (Cr. 2.64) and Dehydration. - dementia  -anemia, type and screen, possible transfusion required during stay based on labs and sxs  - BLE DVTS- will delay surgery and place filter prior to OR then hold therapeutic anticoag until 5 days postop. Heparin 5000u SQ given once in ER and last night hold today for possible filter. - HTN- home meds and defer to medical team  - BPH- risk for post op retention will follow  - UTI POA- defer management to primary     Thank you for allowing us to help in this patients care and please call with questions or concerns.      Jovani Isabel MD  Orthopaedic Surgeon  [facility]    [phone]

## 2018-12-28 NOTE — PROGRESS NOTES
Consent obtained from wife, patient is unable to give his own consent. Patient is alert to name and year only.

## 2018-12-28 NOTE — PROGRESS NOTES
Daily Progress Note: 12/28/2018 Yuly Thorne, DO Assessment/Plan: 1. Closed displaced fracture of left femoral neck (HCC) (12/27/2018)/ Acute mild T1 compression fracture. - pain management,  
- NPO after midnight. 2.  Regarding medical clearance. - cleared by cardiology 3. Acute on chronic renal failure ([facility] (12/27/2018). - Pt looks volume depleted. Continue IVF 
- monitor. 4.  Hypernatremia (12/27/2018). - Start D5 0.45 NS for now and monitor.  
  
5.  UTI (urinary tract infection) (12/27/2018)(POA). - Start ceftriaxone - check UC.  
  
6. Thrombocytopenia ([facility] (12/27/2018), mild. - Monitor.  
  
7. Anxiety and depression (12/27/2018). - Continue home meds  
  
8. BPH (benign prostatic hyperplasia) (12/27/2018). - Continue home meds  
  
9. GERD (gastroesophageal reflux disease) (12/27/2018). - On PPI  
  
10. HTN (hypertension) (12/27/2018). - Continue metoprolol and imdur  
  
11. Seizure ([facility] (12/27/2018). - On vimpat and valproic acid. - Seizure precaution. Check valproic acid.  
   
12. Fall (12/27/2018). Fall precaution  
  
13. Dementia (12/27/2018). - Continue aricept and namenda. - Supportive care  
  
14. Protein- calorie malnutrition.  
- Nutrition consult.  
  
15. Elevated troponin, mild. - Has been cleared by Cardiology 16. DVT BLE 
- IVC filter prior to surgery Problem List: 
Problem List as of 12/28/2018 Date Reviewed: 12/27/2018 Codes Class Noted - Resolved Hip fracture ([facility] ICD-10-CM: Y83.134Y ICD-9-CM: 820.8  12/27/2018 - Present Thrombocytopenia ([facility] ICD-10-CM: D69.6 ICD-9-CM: 287.5  12/27/2018 - Present Anxiety and depression ICD-10-CM: F41.9, F32.9 ICD-9-CM: 300.00, 311  12/27/2018 - Present BPH (benign prostatic hyperplasia) ICD-10-CM: N40.0 ICD-9-CM: 600.00  12/27/2018 - Present GERD (gastroesophageal reflux disease) ICD-10-CM: K21.9 ICD-9-CM: 530.81  12/27/2018 - Present HTN (hypertension) ICD-10-CM: I10 
ICD-9-CM: 401.9  12/27/2018 - Present Seizure ([facility] ICD-10-CM: R56.9 ICD-9-CM: 780.39  12/27/2018 - Present UTI (urinary tract infection) ICD-10-CM: N39.0 ICD-9-CM: 599.0  12/27/2018 - Present Hypernatremia ICD-10-CM: E87.0 ICD-9-CM: 276.0  12/27/2018 - Present Acute on chronic renal failure (HCC) ICD-10-CM: N17.9, N18.9 ICD-9-CM: 584.9, 585.9  12/27/2018 - Present * (Principal) Closed displaced fracture of left femoral neck ([facility] ICD-10-CM: I24.556N ICD-9-CM: 820.8  12/27/2018 - Present Fall ICD-10-CM: W19. Namrata Dye ICD-9-CM: E888.9  12/27/2018 - Present Dementia ICD-10-CM: F03.90 ICD-9-CM: 294.20  12/27/2018 - Present T1 vertebral fracture ([facility] ICD-10-CM: A00.364Z 
ICD-9-CM: 805.2  12/27/2018 - Present Trimalleolar fracture of right ankle ICD-10-CM: P30.206X ICD-9-CM: 824.6  10/24/2012 - Present HPI:  
Mr. Debo Sommers is a 68 y.o.  male who is admitted with femoral fracture. Mr. Debo Sommers with PMH of anxiety, depression, GERD, SZD, fall presented to ER after a fall yesterday evening. Pt is in a SNF rehab center since last week, where he was admitted due to fall. Has been falling frequently recently. Yesterday evening, he fell and hit his head and LT hip. This morning, he was found by nursing staff. Pt has dementia and unable to provide Hx.  
 
12/28: Appears comfortable. Confused this am but does point to the left leg when asked about pain. Has been cleared by cardiology for surgery. To have IVC filter placed before surgery. Review of Systems:  
Review of systems not obtained due to patient factors. Objective:  
Physical Exam:  
 
Visit Vitals /89 (BP 1 Location: Right arm, BP Patient Position: At rest) Pulse (!) 109 Temp 99 °F (37.2 °C) Resp 17 Ht 6' (1.829 m) Wt 182 lb (82.6 kg) SpO2 90% BMI 24.68 kg/m² O2 Flow Rate (L/min): 2 l/min O2 Device: Nasal cannula Temp (24hrs), Av.5 °F (36.9 °C), Min:98.1 °F (36.7 °C), Max:99 °F (37.2 °C) No intake/output data recorded.  1901 -  0700 In: 1146.7 [I.V.:1146.7] Out: - General:  Alert, thin frail elderly male in NAD Head:  Normocephalic, without obvious abnormality, atraumatic. Eyes:  Conjunctivae/corneas clear. PERRL, EOMs intact. Nose: Nares normal. Septum midline. Mucosa normal. No drainage or sinus tenderness. Throat: Lips, mucosa, and tongue dry. Teeth and gums normal.  
Neck: Supple, symmetrical, trachea midline, no adenopathy, thyroid: no enlargement/tenderness/nodules, no carotid bruit and no JVD. Back:   Symmetric, no curvature. ROM normal. No CVA tenderness. Lungs:   Clear to auscultation bilaterally. Chest wall:  No tenderness or deformity. Heart:  Regular rate and rhythm, S1, S2 normal, no murmur, click, rub or gallop. Abdomen:   Soft, non-tender. Bowel sounds normal. No masses,  No organomegaly. Extremities: LLE shortened, no cyanosis or edema. No calf tenderness or cords. Pulses: 2+ and symmetric all extremities. Skin: Skin color, texture, turgor normal. No rashes or lesions Neurologic: CNII-XII intact. Alert and oriented X 1. Fine motor of hands and fingers normal.   equal.   Sensation grossly normal to touch. Data Review:  
   
Recent Days: 
Recent Labs  
  18 
0400 18 
1120 WBC 8.6 10.0 HGB 11.0* 11.0*  
HCT 35.3* 35.5* * 146* Recent Labs  
  18 
0400 18 
1120 * 150*  
K 4.0 3.9 * 113* CO2 27 27 * 124* BUN 27* 31* CREA 2.36* 2.64* CA 9.1 8.9 MG  --  1.8 ALB  --  2.3* TBILI  --  0.2 SGOT  --  17 ALT  --  16 No results for input(s): PH, PCO2, PO2, HCO3, FIO2 in the last 72 hours. 24 Hour Results: 
Recent Results (from the past 24 hour(s)) EKG, 12 LEAD, INITIAL  Collection Time: 18 11:14 AM  
 Result Value Ref Range Ventricular Rate 115 BPM  
 Atrial Rate 115 BPM  
 P-R Interval 142 ms QRS Duration 126 ms  
 Q-T Interval 358 ms QTC Calculation (Bezet) 495 ms Calculated P Axis 44 degrees Calculated R Axis 80 degrees Calculated T Axis 47 degrees Diagnosis Sinus tachycardia Nonspecific intraventricular block Abnormal ECG When compared with ECG of 22-AUG-2015 18:40, 
Vent. rate has increased BY  47 BPM 
QRS duration has increased CBC WITH AUTOMATED DIFF Collection Time: 12/27/18 11:20 AM  
Result Value Ref Range WBC 10.0 4.1 - 11.1 K/uL  
 RBC 3.78 (L) 4.10 - 5.70 M/uL  
 HGB 11.0 (L) 12.1 - 17.0 g/dL HCT 35.5 (L) 36.6 - 50.3 % MCV 93.9 80.0 - 99.0 FL  
 MCH 29.1 26.0 - 34.0 PG  
 MCHC 31.0 30.0 - 36.5 g/dL  
 RDW 14.3 11.5 - 14.5 % PLATELET 848 (L) 655 - 400 K/uL MPV 11.9 8.9 - 12.9 FL  
 NRBC 0.0 0  WBC ABSOLUTE NRBC 0.00 0.00 - 0.01 K/uL NEUTROPHILS 68 32 - 75 % LYMPHOCYTES 12 12 - 49 % MONOCYTES 15 (H) 5 - 13 % EOSINOPHILS 2 0 - 7 % BASOPHILS 1 0 - 1 % IMMATURE GRANULOCYTES 2 (H) 0.0 - 0.5 % ABS. NEUTROPHILS 6.8 1.8 - 8.0 K/UL  
 ABS. LYMPHOCYTES 1.2 0.8 - 3.5 K/UL  
 ABS. MONOCYTES 1.5 (H) 0.0 - 1.0 K/UL  
 ABS. EOSINOPHILS 0.2 0.0 - 0.4 K/UL  
 ABS. BASOPHILS 0.1 0.0 - 0.1 K/UL  
 ABS. IMM. GRANS. 0.2 (H) 0.00 - 0.04 K/UL  
 DF AUTOMATED    
 RBC COMMENTS POIKILOCYTOSIS 1+ 
    
 RBC COMMENTS OVALOCYTES 1+ RBC COMMENTS ANISOCYTOSIS 1+ 
    
 RBC COMMENTS MICROCYTOSIS 
1+ METABOLIC PANEL, COMPREHENSIVE Collection Time: 12/27/18 11:20 AM  
Result Value Ref Range Sodium 150 (H) 136 - 145 mmol/L Potassium 3.9 3.5 - 5.1 mmol/L Chloride 113 (H) 97 - 108 mmol/L  
 CO2 27 21 - 32 mmol/L Anion gap 10 5 - 15 mmol/L Glucose 124 (H) 65 - 100 mg/dL BUN 31 (H) 6 - 20 MG/DL Creatinine 2.64 (H) 0.70 - 1.30 MG/DL  
 BUN/Creatinine ratio 12 12 - 20 GFR est AA 29 (L) >60 ml/min/1.73m2 GFR est non-AA 24 (L) >60 ml/min/1.73m2 Calcium 8.9 8.5 - 10.1 MG/DL Bilirubin, total 0.2 0.2 - 1.0 MG/DL  
 ALT (SGPT) 16 12 - 78 U/L  
 AST (SGOT) 17 15 - 37 U/L Alk. phosphatase 73 45 - 117 U/L Protein, total 6.2 (L) 6.4 - 8.2 g/dL Albumin 2.3 (L) 3.5 - 5.0 g/dL Globulin 3.9 2.0 - 4.0 g/dL A-G Ratio 0.6 (L) 1.1 - 2.2    
TROPONIN I Collection Time: 12/27/18 11:20 AM  
Result Value Ref Range Troponin-I, Qt. 0.17 (H) <0.05 ng/mL MAGNESIUM Collection Time: 12/27/18 11:20 AM  
Result Value Ref Range Magnesium 1.8 1.6 - 2.4 mg/dL URINALYSIS W/MICROSCOPIC Collection Time: 12/27/18 11:20 AM  
Result Value Ref Range Color YELLOW/STRAW Appearance CLOUDY (A) CLEAR Specific gravity 1.015 1.003 - 1.030    
 pH (UA) 5.5 5.0 - 8.0 Protein 30 (A) NEG mg/dL Glucose NEGATIVE  NEG mg/dL Ketone NEGATIVE  NEG mg/dL Bilirubin NEGATIVE  NEG Blood SMALL (A) NEG Urobilinogen 0.2 0.2 - 1.0 EU/dL Nitrites POSITIVE (A) NEG Leukocyte Esterase LARGE (A) NEG    
 WBC >100 (H) 0 - 4 /hpf  
 RBC 0-5 0 - 5 /hpf Epithelial cells FEW FEW /lpf Bacteria NEGATIVE  NEG /hpf URINE CULTURE HOLD SAMPLE Collection Time: 12/27/18 11:20 AM  
Result Value Ref Range Urine culture hold URINE ON HOLD IN MICROBIOLOGY DEPT FOR 3 DAYS. IF UNPRESERVED URINE IS SUBMITTED, IT CANNOT BE USED FOR ADDITIONAL TESTING AFTER 24 HRS, RECOLLECTION WILL BE REQUIRED. CK  
 Collection Time: 12/27/18 11:20 AM  
Result Value Ref Range  39 - 308 U/L  
SAMPLES BEING HELD Collection Time: 12/27/18 11:30 AM  
Result Value Ref Range SAMPLES BEING HELD 1SST, 1RED, 1DRK GRN, 1BLU   
 COMMENT Add-on orders for these samples will be processed based on acceptable specimen integrity and analyte stability, which may vary by analyte. VALPROIC ACID Collection Time: 12/27/18 11:31 AM  
Result Value Ref Range Valproic acid 44 (L) 50 - 100 ug/ml TROPONIN I  
 Collection Time: 12/27/18  6:24 PM  
Result Value Ref Range Troponin-I, Qt. 0.17 (H) <0.05 ng/mL SAMPLES BEING HELD Collection Time: 12/27/18  6:24 PM  
Result Value Ref Range SAMPLES BEING HELD 1SST COMMENT Add-on orders for these samples will be processed based on acceptable specimen integrity and analyte stability, which may vary by analyte. METABOLIC PANEL, BASIC Collection Time: 12/28/18  4:00 AM  
Result Value Ref Range Sodium 151 (H) 136 - 145 mmol/L Potassium 4.0 3.5 - 5.1 mmol/L Chloride 115 (H) 97 - 108 mmol/L  
 CO2 27 21 - 32 mmol/L Anion gap 9 5 - 15 mmol/L Glucose 126 (H) 65 - 100 mg/dL BUN 27 (H) 6 - 20 MG/DL Creatinine 2.36 (H) 0.70 - 1.30 MG/DL  
 BUN/Creatinine ratio 11 (L) 12 - 20 GFR est AA 33 (L) >60 ml/min/1.73m2 GFR est non-AA 27 (L) >60 ml/min/1.73m2 Calcium 9.1 8.5 - 10.1 MG/DL  
CBC W/O DIFF Collection Time: 12/28/18  4:00 AM  
Result Value Ref Range WBC 8.6 4.1 - 11.1 K/uL  
 RBC 3.84 (L) 4.10 - 5.70 M/uL  
 HGB 11.0 (L) 12.1 - 17.0 g/dL HCT 35.3 (L) 36.6 - 50.3 % MCV 91.9 80.0 - 99.0 FL  
 MCH 28.6 26.0 - 34.0 PG  
 MCHC 31.2 30.0 - 36.5 g/dL  
 RDW 14.3 11.5 - 14.5 % PLATELET 107 (L) 921 - 400 K/uL MPV 11.7 8.9 - 12.9 FL  
 NRBC 0.0 0  WBC ABSOLUTE NRBC 0.00 0.00 - 0.01 K/uL Medications reviewed Current Facility-Administered Medications Medication Dose Route Frequency  morphine injection 2 mg  2 mg IntraVENous Q4H PRN  
 sodium chloride (NS) flush 5-10 mL  5-10 mL IntraVENous Q8H  
 sodium chloride (NS) flush 5-10 mL  5-10 mL IntraVENous PRN  
 cefTRIAXone (ROCEPHIN) 1 g in 0.9% sodium chloride (MBP/ADV) 50 mL  1 g IntraVENous Q24H  
 dextrose 5 % - 0.45% NaCl infusion  100 mL/hr IntraVENous CONTINUOUS  
 HYDROcodone-acetaminophen (NORCO) 5-325 mg per tablet 1 Tab  1 Tab Oral Q6H PRN  
 acetaminophen (TYLENOL) tablet 650 mg  650 mg Oral Q6H PRN  
  albuterol (PROVENTIL VENTOLIN) nebulizer solution 2.5 mg  2.5 mg Nebulization Q4H PRN  
 atorvastatin (LIPITOR) tablet 40 mg  40 mg Oral DAILY  donepezil (ARICEPT) tablet 10 mg  10 mg Oral QHS  pantoprazole (PROTONIX) tablet 40 mg  40 mg Oral DAILY  isosorbide mononitrate ER (IMDUR) tablet 30 mg  30 mg Oral DAILY  lacosamide (VIMPAT) tablet 100 mg  100 mg Oral BID  memantine (NAMENDA) tablet 10 mg  10 mg Oral QHS  metoprolol tartrate (LOPRESSOR) tablet 12.5 mg  12.5 mg Oral BID  
 oxybutynin chloride XL (DITROPAN XL) tablet 10 mg  10 mg Oral DAILY  valproic acid (DEPAKENE) capsule 500 mg  500 mg Oral Q12H Care Plan discussed with: Patient/Family and Nurse Total time spent with patient and review of records: 30 minutes.  
 
Kade Orellana MD

## 2018-12-28 NOTE — PROGRESS NOTES
Pt. Has DVT in BLE's present on arrival to hospital. Plan to place IVC filter today per IR, then NPO after MN for L hip hemiarthroplasty Saturday at 2pm with Dr. Diomedes Douglass. Heparin SQ, phroph dose only, post op for 5 days then will start therapeutic dose. Thank you for allowing us to take part in this patients care. POLO Avendano-[facility] PA

## 2018-12-28 NOTE — PROGRESS NOTES
TRANSFER - OUT REPORT: 
 
Verbal report given to 4th floor  on [facility]  being transferred to 4th floor (unit) for routine post - op Report consisted of patients Situation, Background, Assessment and  
Recommendations(SBAR). Information from the following report(s) SBAR was reviewed with the receiving nurse. Lines:  
Peripheral IV 12/27/18 Left Antecubital (Active) Site Assessment Clean, dry, & intact 12/27/2018  4:30 PM  
Phlebitis Assessment 0 12/27/2018  4:30 PM  
Infiltration Assessment 0 12/27/2018  4:30 PM  
Dressing Status Clean, dry, & intact 12/27/2018  4:30 PM  
Dressing Type Trach dressing 12/27/2018  4:30 PM  
Hub Color/Line Status Pink; Infusing 12/27/2018  4:30 PM  
Action Taken Open ports on tubing capped 12/27/2018  4:30 PM  
Alcohol Cap Used Yes 12/27/2018  4:30 PM  
   
Peripheral IV 12/27/18 Distal;Lower;Right; Anterior Arm (Active) Site Assessment Clean, dry, & intact 12/27/2018  6:10 PM  
Phlebitis Assessment 0 12/27/2018  6:10 PM  
Infiltration Assessment 0 12/27/2018  6:10 PM  
Dressing Status Clean, dry, & intact 12/27/2018  6:10 PM  
Dressing Type Transparent 12/27/2018  6:10 PM  
Hub Color/Line Status Pink;Capped 12/27/2018  6:10 PM  
Action Taken Open ports on tubing capped 12/27/2018  6:10 PM  
Alcohol Cap Used Yes 12/27/2018  6:10 PM  
  
 
Opportunity for questions and clarification was provided. Patient transported with: 
 NLT SPINE

## 2018-12-28 NOTE — ANESTHESIA PREPROCEDURE EVALUATION
Anesthetic History No history of anesthetic complications Review of Systems / Medical History Patient summary reviewed, nursing notes reviewed and pertinent labs reviewed Pulmonary Within defined limits Neuro/Psych  
 
seizures Psychiatric history and dementia Cardiovascular Hypertension CAD Comments: Mildly elevated trop; rechecking Q6 hrs if less than 1 ok to proceed per cardiologist with no further testing; chronically occluded LCx; EF in May 50%; ekg this admit no ischemic changes Cardiology has seen patient 12/28 and cleared for surgery. Troponin was mildly elevated and attributed to supply/demand mismatch secondary to tachycardia. GI/Hepatic/Renal 
  
GERD Renal disease: ARF and CRI Endo/Other Anemia (hg=11, sample in blood bank) Other Findings Comments:  
Pt with multiple falls recently p/w broken hip Hypernatremic, Na 150; treating with D5 1/2 NS Na 151 on 95/90, metabolic panel pending for am 12/29. Pt has b/l LE DVTs on US may need filter prior to surgery. .... IVC filter placed 12/28. Physical Exam 
 
Airway Mallampati: II 
 
 
 
 
 Cardiovascular Dental 
 
 
  
Pulmonary Abdominal 
 
 
 
 Other Findings Anesthetic Plan ASA: 4 Anesthesia type: spinal 
 
 
 
 
Induction: Intravenous Pt unable to participate in exam. 
SAB vs GA to be determined by primary anesthesia team. On sq heparin. Qxxa=549. Addendum:  Patient with continued hypernatremia. I would be my preference to wait surgery until this is corrected more. Surgeon and family want to proceed with surgery understanding that the patient may be at increased risk of a complication from his electrolyte imbalance. Will attempt spinal and try to avoid GA.

## 2018-12-28 NOTE — PROGRESS NOTES
Nutrition Assessment: 
 
RECOMMENDATIONS/INTERVENTION(S):  
1. Continue cardiac diet post-op. Texture per SLP. 2. Vanilla Mighty Shake with all meals when able to take PO. 
 
3. Obtain updated measured wt. 4. Will monitor diet advance and tolerance, intake, wt and plan of care. ASSESSMENT:  
12/28: Received MD consult for general nutrition management. Admitted for  Left femoral neck fracture with acute on chronic renal failure and UTI. PMHx of dementia, HTN, seizures. Pt lives at [facility]. Pt unable to provide history due to dementia. Per family, appetite and wt have declined in at least the past month. Wife thought pt had lost 8-16 lbs in 1 month (4-16% body wt). Pt has had dysphagia with meats and recently liquids, was scheduled for an MBS before he was admitted. Observed moderate clavicular, deltoid, temporal and orbital muscle wasting. Moderate thoracic subcutaneous fat wasting. Pt has been NPO today for IVC filter placement and for arthroplasty tomorrow. Discussed supplements and general plan of care with wife, who was agreeable to ONS. Thought pt would prefer vanilla flavor. Patient meets criteria for Moderate Protein Calorie Malnutrition as evidenced by:  
ASPEN Malnutrition Criteria Acute Illness, Chronic Illness, or Social/Enviornmental: Acute illness Energy Intake: <75% est energy req for > 7 days Weight Loss: 5% x 1 mo Body Fat: Moderate Muscle Mass: Moderate ASPEN Malnutrition Score - Acute Illness: 14 
Acute Illness - Malnutrition Diagnosis: Moderate malnutrition. Diet Order: NPO 
% Eaten:  No data found. Pertinent Medications: [x] Reviewed Chemistries: [x] Reviewed Anthropometrics: Height: 6' (182.9 cm) Weight: 82.6 kg (182 lb) IBW (%IBW):   ( ) UBW (%UBW):   (  %) BMI: Body mass index is 24.68 kg/m². This BMI is indicative of: 
 [] Underweight    [x] Normal    [] Overweight    []  Obesity    []  Extreme Obesity (BMI>40) Estimated Nutrition Needs (Based on): 2091 Kcals/day(BMr 1609 x 1.3 AF) , 91 g(-107g (1.1-1.3g/kg actual bw)) Protein Carbohydrate: At Least 130 g/day  Fluids: 2100 ml Last BM:  unknown      Bowel Sounds: active Skin:    [x] Intact   [] Incision  [] Breakdown   [] DTI   [] Tears/Excoriation/Abrasion  []Edema [] Other: Wt Readings from Last 30 Encounters:  
12/27/18 82.6 kg (182 lb) 12/22/16 83 kg (182 lb 15.7 oz) 08/22/15 79.4 kg (175 lb) 10/24/12 79.4 kg (175 lb) NUTRITION DIAGNOSES:  
Problem:  Unintended weight loss Etiology: related to inadequate energy intake Signs/Symptoms: as evidenced by reported 8-16lbs wt loss in 1 month, reduced PO, moderate muscle and subq fat wasting NUTRITION INTERVENTIONS: 
Meals/Snacks: General/healthful diet   Supplements: Commercial supplement GOAL:  
pt to maintain wt at or above 82.6 kg in the next 3-5 days Cultural, Rastafarian, or Ethnic Dietary Needs: None EDUCATION & DISCHARGE NEEDS:  
 [] None Identified 
 [] Identified and Education Provided/Documented 
 [x] Identified and Pt declined/was not appropriate [x] Interdisciplinary Care Plan Reviewed/Documented  
 [x] Discharge Needs: Continue nutrition supplements 2-3 per day until appetite returns to baseline 
 [] No Nutrition Related Discharge Needs NUTRITION RISK:  
Pt Is At Nutrition Risk  [x] No Nutrition Risk Identified  [] PT SEEN FOR:  
 [x]  MD Consult: []Calorie Count []Diabetic Diet Education []Diet Education []Electrolyte Management 
   [x]General Nutrition Management and Supplements []Management of Tube Feeding []TPN Recommendations []  RN Referral:  []MST score >=2 
   []Enteral/Parenteral Nutrition PTA []Pregnant: Gestational DM or Multigestation  
              [] Pressure Ulcer 
 
[]  Low BMI      []  Length of Stay       [] Dysphagia Diet         [] Ventilator 
[]  Follow-up Previous Recommendations: [] Implemented          [] Not Implemented          [x] Not Applicable Previous Goal: 
 [] Met              [] Progressing Towards Goal              [] Not Progressing Towards Goal   [x] Not Applicable Ty TOY Michele Pager [phone] Office [phone]

## 2018-12-28 NOTE — CDMP QUERY
1.  
Please clarify if this patient is (was) being treated/managed for:  
 
=> Myocardial infarction type 2 in the setting supply demand mismatch in a patient with tachycardia related to pain and a chronically occluded LCx by cath (3/2017), being treated with cardiology consult and serial trending of cardiac enzymes prior to clearance for surgery.  
=> Hypertroponinemia not clinically relevant.  
=> Other explanation of clinical findings  
=> Clinically Undetermined (no explanation for clinical findings) The medical record reflects the following clinical findings, treatment, and risk factors. Risk Factors:  elderly male with previous cardiac history, known chronically occluded LCx, htn,  
 
Clinical Indicators:  69 y/o white male admitted for L hip pain and femur fracture. Cardiology notes \" No active cardiac symptoms, non-ischemic EKG, minimally abnormal troponin. I suspect that his minimally abnormal troponin is due to supply-demand mismatch in a patient with tachycardia due to pain and a chronically occluded LCx by cath (3/2017). I agree with Dr. Ros Rodriguez plan of checking a 6h troponin. \". serial troponin x 2 0.17 both times Treatment: cardiology consult, serial trending of cardiac enzymes. Please clarify and document your clinical opinion in the progress notes and discharge summary including the definitive and/or presumptive diagnosis, (suspected or probable), related to the above clinical findings. Please include clinical findings supporting your diagnosis. Thanks for your time. Marleny Wilson RN, BSN 
[phone]

## 2018-12-29 ENCOUNTER — ANESTHESIA (OUTPATIENT)
Dept: SURGERY | Age: 73
DRG: 470 | End: 2018-12-29
Payer: MEDICARE

## 2018-12-29 ENCOUNTER — APPOINTMENT (OUTPATIENT)
Dept: GENERAL RADIOLOGY | Age: 73
DRG: 470 | End: 2018-12-29
Attending: ORTHOPAEDIC SURGERY
Payer: MEDICARE

## 2018-12-29 LAB
ALBUMIN SERPL-MCNC: 2 G/DL (ref 3.5–5)
ALBUMIN/GLOB SERPL: 0.5 {RATIO} (ref 1.1–2.2)
ALP SERPL-CCNC: 59 U/L (ref 45–117)
ALT SERPL-CCNC: 14 U/L (ref 12–78)
ANION GAP SERPL CALC-SCNC: 10 MMOL/L (ref 5–15)
ANION GAP SERPL CALC-SCNC: 6 MMOL/L (ref 5–15)
ANION GAP SERPL CALC-SCNC: 8 MMOL/L (ref 5–15)
AST SERPL-CCNC: 15 U/L (ref 15–37)
BACTERIA SPEC CULT: ABNORMAL
BASOPHILS # BLD: 0 K/UL (ref 0–0.1)
BASOPHILS NFR BLD: 0 % (ref 0–1)
BILIRUB SERPL-MCNC: 0.2 MG/DL (ref 0.2–1)
BUN SERPL-MCNC: 27 MG/DL (ref 6–20)
BUN SERPL-MCNC: 29 MG/DL (ref 6–20)
BUN SERPL-MCNC: 29 MG/DL (ref 6–20)
BUN/CREAT SERPL: 12 (ref 12–20)
BUN/CREAT SERPL: 14 (ref 12–20)
BUN/CREAT SERPL: 14 (ref 12–20)
CALCIUM SERPL-MCNC: 8.7 MG/DL (ref 8.5–10.1)
CALCIUM SERPL-MCNC: 8.7 MG/DL (ref 8.5–10.1)
CALCIUM SERPL-MCNC: 8.9 MG/DL (ref 8.5–10.1)
CC UR VC: ABNORMAL
CHLORIDE SERPL-SCNC: 115 MMOL/L (ref 97–108)
CHLORIDE SERPL-SCNC: 116 MMOL/L (ref 97–108)
CHLORIDE SERPL-SCNC: 117 MMOL/L (ref 97–108)
CO2 SERPL-SCNC: 26 MMOL/L (ref 21–32)
CO2 SERPL-SCNC: 28 MMOL/L (ref 21–32)
CO2 SERPL-SCNC: 29 MMOL/L (ref 21–32)
CREAT SERPL-MCNC: 2.1 MG/DL (ref 0.7–1.3)
CREAT SERPL-MCNC: 2.11 MG/DL (ref 0.7–1.3)
CREAT SERPL-MCNC: 2.17 MG/DL (ref 0.7–1.3)
DIFFERENTIAL METHOD BLD: ABNORMAL
EOSINOPHIL # BLD: 0.7 K/UL (ref 0–0.4)
EOSINOPHIL NFR BLD: 11 % (ref 0–7)
ERYTHROCYTE [DISTWIDTH] IN BLOOD BY AUTOMATED COUNT: 14.6 % (ref 11.5–14.5)
GLOBULIN SER CALC-MCNC: 3.7 G/DL (ref 2–4)
GLUCOSE SERPL-MCNC: 100 MG/DL (ref 65–100)
GLUCOSE SERPL-MCNC: 114 MG/DL (ref 65–100)
GLUCOSE SERPL-MCNC: 124 MG/DL (ref 65–100)
HCT VFR BLD AUTO: 32.1 % (ref 36.6–50.3)
HGB BLD-MCNC: 10 G/DL (ref 12.1–17)
IMM GRANULOCYTES # BLD: 0 K/UL
IMM GRANULOCYTES NFR BLD AUTO: 0 %
LYMPHOCYTES # BLD: 1.1 K/UL (ref 0.8–3.5)
LYMPHOCYTES NFR BLD: 17 % (ref 12–49)
MCH RBC QN AUTO: 29.2 PG (ref 26–34)
MCHC RBC AUTO-ENTMCNC: 31.2 G/DL (ref 30–36.5)
MCV RBC AUTO: 93.9 FL (ref 80–99)
METAMYELOCYTES NFR BLD MANUAL: 1 %
MONOCYTES # BLD: 0.7 K/UL (ref 0–1)
MONOCYTES NFR BLD: 10 % (ref 5–13)
MYELOCYTES NFR BLD MANUAL: 1 %
NEUTS SEG # BLD: 4 K/UL (ref 1.8–8)
NEUTS SEG NFR BLD: 60 % (ref 32–75)
NRBC # BLD: 0 K/UL (ref 0–0.01)
NRBC BLD-RTO: 0 PER 100 WBC
PLATELET # BLD AUTO: 155 K/UL (ref 150–400)
PMV BLD AUTO: 11.8 FL (ref 8.9–12.9)
POTASSIUM SERPL-SCNC: 4 MMOL/L (ref 3.5–5.1)
POTASSIUM SERPL-SCNC: 4.3 MMOL/L (ref 3.5–5.1)
POTASSIUM SERPL-SCNC: 4.4 MMOL/L (ref 3.5–5.1)
PROT SERPL-MCNC: 5.7 G/DL (ref 6.4–8.2)
RBC # BLD AUTO: 3.42 M/UL (ref 4.1–5.7)
RBC MORPH BLD: ABNORMAL
SERVICE CMNT-IMP: ABNORMAL
SODIUM SERPL-SCNC: 150 MMOL/L (ref 136–145)
SODIUM SERPL-SCNC: 151 MMOL/L (ref 136–145)
SODIUM SERPL-SCNC: 154 MMOL/L (ref 136–145)
WBC # BLD AUTO: 6.7 K/UL (ref 4.1–11.1)

## 2018-12-29 PROCEDURE — 72170 X-RAY EXAM OF PELVIS: CPT

## 2018-12-29 PROCEDURE — 74011250637 HC RX REV CODE- 250/637: Performed by: INTERNAL MEDICINE

## 2018-12-29 PROCEDURE — 77030012935 HC DRSG AQUACEL BMS -B: Performed by: ORTHOPAEDIC SURGERY

## 2018-12-29 PROCEDURE — 74011250636 HC RX REV CODE- 250/636

## 2018-12-29 PROCEDURE — 74011250636 HC RX REV CODE- 250/636: Performed by: ORTHOPAEDIC SURGERY

## 2018-12-29 PROCEDURE — 77030011264 HC ELECTRD BLD EXT COVD -A: Performed by: ORTHOPAEDIC SURGERY

## 2018-12-29 PROCEDURE — 80053 COMPREHEN METABOLIC PANEL: CPT

## 2018-12-29 PROCEDURE — 77030018547 HC SUT ETHBND1 J&J -B: Performed by: ORTHOPAEDIC SURGERY

## 2018-12-29 PROCEDURE — 77030033138 HC SUT PGA STRATFX J&J -B: Performed by: ORTHOPAEDIC SURGERY

## 2018-12-29 PROCEDURE — 74011250637 HC RX REV CODE- 250/637: Performed by: PHYSICIAN ASSISTANT

## 2018-12-29 PROCEDURE — 77030038587 HC LNR BOOT DISP ALLN -B: Performed by: ORTHOPAEDIC SURGERY

## 2018-12-29 PROCEDURE — 77030033067 HC SUT PDO STRATFX SPIR J&J -B: Performed by: ORTHOPAEDIC SURGERY

## 2018-12-29 PROCEDURE — 77030013708 HC HNDPC SUC IRR PULS STRY –B: Performed by: ORTHOPAEDIC SURGERY

## 2018-12-29 PROCEDURE — C1776 JOINT DEVICE (IMPLANTABLE): HCPCS | Performed by: ORTHOPAEDIC SURGERY

## 2018-12-29 PROCEDURE — 77030018846 HC SOL IRR STRL H20 ICUM -A: Performed by: ORTHOPAEDIC SURGERY

## 2018-12-29 PROCEDURE — 77030003882 HC BIT DRL TWST BRSM -B: Performed by: ORTHOPAEDIC SURGERY

## 2018-12-29 PROCEDURE — 76060000034 HC ANESTHESIA 1.5 TO 2 HR: Performed by: ORTHOPAEDIC SURGERY

## 2018-12-29 PROCEDURE — 77030018842 HC SOL IRR SOD CL 9% BAXT -A: Performed by: ORTHOPAEDIC SURGERY

## 2018-12-29 PROCEDURE — 77010033678 HC OXYGEN DAILY

## 2018-12-29 PROCEDURE — 85025 COMPLETE CBC W/AUTO DIFF WBC: CPT

## 2018-12-29 PROCEDURE — 77030006835 HC BLD SAW SAG STRY -B: Performed by: ORTHOPAEDIC SURGERY

## 2018-12-29 PROCEDURE — 77030029883 HC RETRV SUT ARTHSCP HOFFE BEAT -B: Performed by: ORTHOPAEDIC SURGERY

## 2018-12-29 PROCEDURE — 76010000162 HC OR TIME 1.5 TO 2 HR INTENSV-TIER 1: Performed by: ORTHOPAEDIC SURGERY

## 2018-12-29 PROCEDURE — 77030007866 HC KT SPN ANES BBMI -B: Performed by: ANESTHESIOLOGY

## 2018-12-29 PROCEDURE — 77030010785: Performed by: ORTHOPAEDIC SURGERY

## 2018-12-29 PROCEDURE — 94760 N-INVAS EAR/PLS OXIMETRY 1: CPT

## 2018-12-29 PROCEDURE — 0SRS0JA REPLACEMENT OF LEFT HIP JOINT, FEMORAL SURFACE WITH SYNTHETIC SUBSTITUTE, UNCEMENTED, OPEN APPROACH: ICD-10-PCS | Performed by: ORTHOPAEDIC SURGERY

## 2018-12-29 PROCEDURE — 74011250636 HC RX REV CODE- 250/636: Performed by: INTERNAL MEDICINE

## 2018-12-29 PROCEDURE — 74011250636 HC RX REV CODE- 250/636: Performed by: FAMILY MEDICINE

## 2018-12-29 PROCEDURE — 77030002933 HC SUT MCRYL J&J -A: Performed by: ORTHOPAEDIC SURGERY

## 2018-12-29 PROCEDURE — 77030020788: Performed by: ORTHOPAEDIC SURGERY

## 2018-12-29 PROCEDURE — 36415 COLL VENOUS BLD VENIPUNCTURE: CPT

## 2018-12-29 PROCEDURE — 74011000258 HC RX REV CODE- 258: Performed by: INTERNAL MEDICINE

## 2018-12-29 PROCEDURE — 76210000006 HC OR PH I REC 0.5 TO 1 HR: Performed by: ORTHOPAEDIC SURGERY

## 2018-12-29 PROCEDURE — 74011000250 HC RX REV CODE- 250

## 2018-12-29 PROCEDURE — 74011250637 HC RX REV CODE- 250/637: Performed by: ORTHOPAEDIC SURGERY

## 2018-12-29 PROCEDURE — 80048 BASIC METABOLIC PNL TOTAL CA: CPT

## 2018-12-29 PROCEDURE — 77030018836 HC SOL IRR NACL ICUM -A: Performed by: ORTHOPAEDIC SURGERY

## 2018-12-29 PROCEDURE — 65270000029 HC RM PRIVATE

## 2018-12-29 PROCEDURE — 77030011640 HC PAD GRND REM COVD -A: Performed by: ORTHOPAEDIC SURGERY

## 2018-12-29 DEVICE — STEM FEM PRSS FT HIP BPLR/UPLR CEM: Type: IMPLANTABLE DEVICE | Status: FUNCTIONAL

## 2018-12-29 DEVICE — ARTICUL/EZE FEMORAL HEAD DIAMETER 28MM +5 12/14 TAPER
Type: IMPLANTABLE DEVICE | Site: HIP | Status: FUNCTIONAL
Brand: ARTICUL/EZE

## 2018-12-29 DEVICE — SUMMIT FEMORAL STEM 12/14 TAPER TAPERED W/DUOFIX HA SIZE 8 STD 160MM
Type: IMPLANTABLE DEVICE | Site: HIP | Status: FUNCTIONAL
Brand: SUMMIT

## 2018-12-29 DEVICE — SELF CENTERING BI-POLAR HEAD 28MM ID 53MM OD
Type: IMPLANTABLE DEVICE | Site: HIP | Status: FUNCTIONAL
Brand: SELF CENTERING

## 2018-12-29 RX ORDER — DEXTROSE MONOHYDRATE 50 MG/ML
250 INJECTION, SOLUTION INTRAVENOUS ONCE
Status: COMPLETED | OUTPATIENT
Start: 2018-12-29 | End: 2018-12-29

## 2018-12-29 RX ORDER — NALOXONE HYDROCHLORIDE 0.4 MG/ML
0.4 INJECTION, SOLUTION INTRAMUSCULAR; INTRAVENOUS; SUBCUTANEOUS AS NEEDED
Status: DISCONTINUED | OUTPATIENT
Start: 2018-12-29 | End: 2019-01-02 | Stop reason: HOSPADM

## 2018-12-29 RX ORDER — PROPOFOL 10 MG/ML
INJECTION, EMULSION INTRAVENOUS
Status: DISCONTINUED | OUTPATIENT
Start: 2018-12-29 | End: 2018-12-29 | Stop reason: HOSPADM

## 2018-12-29 RX ORDER — PROPOFOL 10 MG/ML
INJECTION, EMULSION INTRAVENOUS AS NEEDED
Status: DISCONTINUED | OUTPATIENT
Start: 2018-12-29 | End: 2018-12-29 | Stop reason: HOSPADM

## 2018-12-29 RX ORDER — CEFAZOLIN SODIUM 1 G/3ML
INJECTION, POWDER, FOR SOLUTION INTRAMUSCULAR; INTRAVENOUS AS NEEDED
Status: DISCONTINUED | OUTPATIENT
Start: 2018-12-29 | End: 2018-12-29 | Stop reason: HOSPADM

## 2018-12-29 RX ORDER — HYDROMORPHONE HYDROCHLORIDE 1 MG/ML
.25-1 INJECTION, SOLUTION INTRAMUSCULAR; INTRAVENOUS; SUBCUTANEOUS
Status: DISCONTINUED | OUTPATIENT
Start: 2018-12-29 | End: 2018-12-29 | Stop reason: HOSPADM

## 2018-12-29 RX ORDER — FERROUS SULFATE, DRIED 160(50) MG
1 TABLET, EXTENDED RELEASE ORAL
Status: DISCONTINUED | OUTPATIENT
Start: 2018-12-30 | End: 2019-01-02 | Stop reason: HOSPADM

## 2018-12-29 RX ORDER — FACIAL-BODY WIPES
10 EACH TOPICAL DAILY PRN
Status: DISCONTINUED | OUTPATIENT
Start: 2018-12-31 | End: 2019-01-02 | Stop reason: HOSPADM

## 2018-12-29 RX ORDER — SODIUM CHLORIDE, SODIUM LACTATE, POTASSIUM CHLORIDE, CALCIUM CHLORIDE 600; 310; 30; 20 MG/100ML; MG/100ML; MG/100ML; MG/100ML
125 INJECTION, SOLUTION INTRAVENOUS CONTINUOUS
Status: DISCONTINUED | OUTPATIENT
Start: 2018-12-29 | End: 2018-12-29 | Stop reason: HOSPADM

## 2018-12-29 RX ORDER — SODIUM CHLORIDE, SODIUM LACTATE, POTASSIUM CHLORIDE, CALCIUM CHLORIDE 600; 310; 30; 20 MG/100ML; MG/100ML; MG/100ML; MG/100ML
INJECTION, SOLUTION INTRAVENOUS
Status: DISCONTINUED | OUTPATIENT
Start: 2018-12-29 | End: 2018-12-29 | Stop reason: HOSPADM

## 2018-12-29 RX ORDER — SODIUM CHLORIDE, SODIUM LACTATE, POTASSIUM CHLORIDE, CALCIUM CHLORIDE 600; 310; 30; 20 MG/100ML; MG/100ML; MG/100ML; MG/100ML
100 INJECTION, SOLUTION INTRAVENOUS CONTINUOUS
Status: DISCONTINUED | OUTPATIENT
Start: 2018-12-29 | End: 2018-12-29 | Stop reason: HOSPADM

## 2018-12-29 RX ORDER — MIDAZOLAM HYDROCHLORIDE 1 MG/ML
INJECTION, SOLUTION INTRAMUSCULAR; INTRAVENOUS AS NEEDED
Status: DISCONTINUED | OUTPATIENT
Start: 2018-12-29 | End: 2018-12-29 | Stop reason: HOSPADM

## 2018-12-29 RX ORDER — CEFAZOLIN SODIUM/WATER 2 G/20 ML
SYRINGE (ML) INTRAVENOUS
Status: DISPENSED
Start: 2018-12-29 | End: 2018-12-30

## 2018-12-29 RX ORDER — DEXTROSE MONOHYDRATE 50 MG/ML
50 INJECTION, SOLUTION INTRAVENOUS CONTINUOUS
Status: DISCONTINUED | OUTPATIENT
Start: 2018-12-29 | End: 2019-01-02

## 2018-12-29 RX ORDER — POLYETHYLENE GLYCOL 3350 17 G/17G
17 POWDER, FOR SOLUTION ORAL DAILY
Status: DISCONTINUED | OUTPATIENT
Start: 2018-12-30 | End: 2019-01-02 | Stop reason: HOSPADM

## 2018-12-29 RX ORDER — AMOXICILLIN 250 MG
1 CAPSULE ORAL 2 TIMES DAILY
Status: DISCONTINUED | OUTPATIENT
Start: 2018-12-29 | End: 2019-01-02 | Stop reason: HOSPADM

## 2018-12-29 RX ORDER — SODIUM CHLORIDE 0.9 % (FLUSH) 0.9 %
5-10 SYRINGE (ML) INJECTION EVERY 8 HOURS
Status: DISCONTINUED | OUTPATIENT
Start: 2018-12-29 | End: 2019-01-02 | Stop reason: HOSPADM

## 2018-12-29 RX ORDER — HEPARIN SODIUM 5000 [USP'U]/ML
5000 INJECTION, SOLUTION INTRAVENOUS; SUBCUTANEOUS EVERY 8 HOURS
Status: DISCONTINUED | OUTPATIENT
Start: 2018-12-30 | End: 2019-01-02

## 2018-12-29 RX ORDER — ACETAMINOPHEN 325 MG/1
650 TABLET ORAL EVERY 6 HOURS
Status: DISCONTINUED | OUTPATIENT
Start: 2018-12-29 | End: 2018-12-31

## 2018-12-29 RX ORDER — DIPHENHYDRAMINE HYDROCHLORIDE 50 MG/ML
12.5 INJECTION, SOLUTION INTRAMUSCULAR; INTRAVENOUS AS NEEDED
Status: DISCONTINUED | OUTPATIENT
Start: 2018-12-29 | End: 2018-12-29 | Stop reason: HOSPADM

## 2018-12-29 RX ORDER — BUPIVACAINE HYDROCHLORIDE 7.5 MG/ML
INJECTION, SOLUTION EPIDURAL; RETROBULBAR AS NEEDED
Status: DISCONTINUED | OUTPATIENT
Start: 2018-12-29 | End: 2018-12-29 | Stop reason: HOSPADM

## 2018-12-29 RX ORDER — ONDANSETRON 2 MG/ML
4 INJECTION INTRAMUSCULAR; INTRAVENOUS AS NEEDED
Status: DISCONTINUED | OUTPATIENT
Start: 2018-12-29 | End: 2018-12-29 | Stop reason: HOSPADM

## 2018-12-29 RX ORDER — KETAMINE HYDROCHLORIDE 10 MG/ML
INJECTION, SOLUTION INTRAMUSCULAR; INTRAVENOUS AS NEEDED
Status: DISCONTINUED | OUTPATIENT
Start: 2018-12-29 | End: 2018-12-29 | Stop reason: HOSPADM

## 2018-12-29 RX ORDER — LIDOCAINE HYDROCHLORIDE 10 MG/ML
0.1 INJECTION, SOLUTION EPIDURAL; INFILTRATION; INTRACAUDAL; PERINEURAL AS NEEDED
Status: DISCONTINUED | OUTPATIENT
Start: 2018-12-29 | End: 2018-12-29 | Stop reason: HOSPADM

## 2018-12-29 RX ORDER — SODIUM CHLORIDE 0.9 % (FLUSH) 0.9 %
5-10 SYRINGE (ML) INJECTION AS NEEDED
Status: DISCONTINUED | OUTPATIENT
Start: 2018-12-29 | End: 2019-01-02 | Stop reason: HOSPADM

## 2018-12-29 RX ADMIN — SODIUM CHLORIDE, SODIUM LACTATE, POTASSIUM CHLORIDE, CALCIUM CHLORIDE: 600; 310; 30; 20 INJECTION, SOLUTION INTRAVENOUS at 14:30

## 2018-12-29 RX ADMIN — LACOSAMIDE 100 MG: 100 TABLET, FILM COATED ORAL at 08:49

## 2018-12-29 RX ADMIN — DEXTROSE MONOHYDRATE AND SODIUM CHLORIDE 100 ML/HR: 5; .45 INJECTION, SOLUTION INTRAVENOUS at 06:39

## 2018-12-29 RX ADMIN — MORPHINE SULFATE 2 MG: 4 INJECTION, SOLUTION INTRAMUSCULAR; INTRAVENOUS at 20:27

## 2018-12-29 RX ADMIN — VALPROIC ACID 500 MG: 250 CAPSULE, LIQUID FILLED ORAL at 08:49

## 2018-12-29 RX ADMIN — DOCUSATE SODIUM AND SENNOSIDES 1 TABLET: 8.6; 5 TABLET, FILM COATED ORAL at 18:21

## 2018-12-29 RX ADMIN — HYDROCODONE BITARTRATE AND ACETAMINOPHEN 1 TABLET: 5; 325 TABLET ORAL at 00:51

## 2018-12-29 RX ADMIN — METOPROLOL TARTRATE 12.5 MG: 25 TABLET ORAL at 17:44

## 2018-12-29 RX ADMIN — SODIUM CHLORIDE, SODIUM LACTATE, POTASSIUM CHLORIDE, CALCIUM CHLORIDE: 600; 310; 30; 20 INJECTION, SOLUTION INTRAVENOUS at 15:53

## 2018-12-29 RX ADMIN — HEPARIN SODIUM 5000 UNITS: 5000 INJECTION INTRAVENOUS; SUBCUTANEOUS at 23:34

## 2018-12-29 RX ADMIN — OXYBUTYNIN CHLORIDE 10 MG: 5 TABLET, EXTENDED RELEASE ORAL at 08:49

## 2018-12-29 RX ADMIN — ISOSORBIDE MONONITRATE 30 MG: 30 TABLET, EXTENDED RELEASE ORAL at 08:49

## 2018-12-29 RX ADMIN — PANTOPRAZOLE SODIUM 40 MG: 40 TABLET, DELAYED RELEASE ORAL at 08:49

## 2018-12-29 RX ADMIN — VALPROIC ACID 500 MG: 250 CAPSULE, LIQUID FILLED ORAL at 20:27

## 2018-12-29 RX ADMIN — KETAMINE HYDROCHLORIDE 10 MG: 10 INJECTION, SOLUTION INTRAMUSCULAR; INTRAVENOUS at 14:40

## 2018-12-29 RX ADMIN — CEFAZOLIN SODIUM 2 G: 1 INJECTION, POWDER, FOR SOLUTION INTRAMUSCULAR; INTRAVENOUS at 15:18

## 2018-12-29 RX ADMIN — ATORVASTATIN CALCIUM 40 MG: 20 TABLET, FILM COATED ORAL at 08:49

## 2018-12-29 RX ADMIN — DEXTROSE MONOHYDRATE 100 ML/HR: 5 INJECTION, SOLUTION INTRAVENOUS at 21:31

## 2018-12-29 RX ADMIN — KETAMINE HYDROCHLORIDE 30 MG: 10 INJECTION, SOLUTION INTRAMUSCULAR; INTRAVENOUS at 14:36

## 2018-12-29 RX ADMIN — ACETAMINOPHEN 650 MG: 325 TABLET, FILM COATED ORAL at 23:34

## 2018-12-29 RX ADMIN — DEXTROSE MONOHYDRATE 250 ML/HR: 5 INJECTION, SOLUTION INTRAVENOUS at 11:12

## 2018-12-29 RX ADMIN — MEMANTINE 10 MG: 10 TABLET ORAL at 22:50

## 2018-12-29 RX ADMIN — HYDROCODONE BITARTRATE AND ACETAMINOPHEN 1 TABLET: 5; 325 TABLET ORAL at 12:19

## 2018-12-29 RX ADMIN — PROPOFOL 50 MCG/KG/MIN: 10 INJECTION, EMULSION INTRAVENOUS at 14:57

## 2018-12-29 RX ADMIN — DEXTROSE MONOHYDRATE 100 ML/HR: 5 INJECTION, SOLUTION INTRAVENOUS at 17:20

## 2018-12-29 RX ADMIN — CEFTRIAXONE SODIUM 1 G: 1 INJECTION, POWDER, FOR SOLUTION INTRAMUSCULAR; INTRAVENOUS at 12:18

## 2018-12-29 RX ADMIN — DEXTROSE MONOHYDRATE 100 ML/HR: 5 INJECTION, SOLUTION INTRAVENOUS at 12:15

## 2018-12-29 RX ADMIN — PROPOFOL 20 MG: 10 INJECTION, EMULSION INTRAVENOUS at 14:57

## 2018-12-29 RX ADMIN — BUPIVACAINE HYDROCHLORIDE 2.6 ML: 7.5 INJECTION, SOLUTION EPIDURAL; RETROBULBAR at 14:43

## 2018-12-29 RX ADMIN — METOPROLOL TARTRATE 12.5 MG: 25 TABLET ORAL at 08:47

## 2018-12-29 RX ADMIN — MIDAZOLAM HYDROCHLORIDE 1 MG: 1 INJECTION, SOLUTION INTRAMUSCULAR; INTRAVENOUS at 14:36

## 2018-12-29 RX ADMIN — DONEPEZIL HYDROCHLORIDE 10 MG: 5 TABLET, FILM COATED ORAL at 22:50

## 2018-12-29 RX ADMIN — Medication 10 ML: at 17:44

## 2018-12-29 RX ADMIN — ACETAMINOPHEN 650 MG: 325 TABLET, FILM COATED ORAL at 18:21

## 2018-12-29 RX ADMIN — LACOSAMIDE 100 MG: 100 TABLET, FILM COATED ORAL at 17:44

## 2018-12-29 RX ADMIN — MIDAZOLAM HYDROCHLORIDE 1 MG: 1 INJECTION, SOLUTION INTRAMUSCULAR; INTRAVENOUS at 14:50

## 2018-12-29 NOTE — BRIEF OP NOTE
BRIEF OPERATIVE NOTE Date of Procedure: 12/29/2018 Preoperative Diagnosis: LEFT DISPLACED FEMORAL NECK FRACTURE Postoperative Diagnosis: FEMORAL NECK FRACTURE Procedure(s): HIP HEMIARTHROPLASTY POSTERIOR LEFT Surgeon(s) and Role: Betty Milner MD - Primary Surgical Assistant: Erika Garza LPN Surgical Staff: 
Circ-1: William Paul RN Scrub Tech-1: Serge Fan Surg Asst-1: Nic Early LPN Event Time In Time Out Incision Start 12/29/2018 1515 Incision Close Anesthesia: General  
Estimated Blood Loss: 300 Specimens: * No specimens in log * Findings: displaced femoral neck fracture Complications: none immediate Implants:  
Implant Name Type Inv. Item Serial No.  Lot No. LRB No. Used Action STEM FEM DUOFIX SZ 8 STD OFF -- [place] - SN/A  STEM FEM DUOFIX SZ 8 STD OFF -- [place] N/A [facility] [employer] [id number] Left 1 Implanted HEAD FEM [facility] [id number] SHAHEED --  - SN/A  HEAD FEM [facility] [id number] SHAHEED --  N/A [facility] [employer] [id number] Left 1 Implanted HEAD FEM 28MM +5MM NK --  - SN/A  HEAD FEM 28MM +5MM NK --  N/A [facility] [employer] [id number] Left 1 Implanted Post Op Plan: ADAT On Ceftriaxone for UTI will cover post op coverage DVT PPX: IVC filter, subcutaneous heparin 5000 q8 then in 5 days ok with full anticoagulation WBAT LLE posterior hip precautions, abduction pillow while in bed 2/2 dementia Hypernatremia defer to medical team btu will order BMP in pacu Reinforce dressing as needed

## 2018-12-29 NOTE — PROGRESS NOTES
TOTAL HIP ARTHROPLASTY DAILY NOTE 
 
POD 0 Day of Surgery s/p Procedure(s): HIP HEMIARTHROPLASTY POSTERIOR LEFT 
 
 ASSESSMENT / PLAN :  
1. Admit to medicine defer medical management to primary 2. Plan for left hip hemiarthroplasty 3. NPO 
4. BLE DVTs: IVC filter, post op sq heparin PPX dosing for 5 days then therapeutic anticoagulation 5. Hypernatremia cont to replete slowly, risk discussed with anesthesia and family and understand puts patient at elevated risk but stable despite fluid repletion with D5 
6. Dementia/anxiety/depression: cont home meds and cont to monitor 7. UTI: cont ceftriaxone 8. Ancef preop for surgical PPX 9. Pt marked and consented. Long conversation with wife regarding risks of surgery including delaying or proceeding with patients hypernatremia specifically to deal with cerebral edema or complications from hypernatremia correction or electrolyte abnormality, infection, wound issues, dislocation, leg length discrepancy, periprosthetic fracture, damaage to near by structures including nerves, arteries and veins, worsening of DVT or causing PE, need for further surgeries, stroke, MI or death. 10. Thrombocytopenia 11.  Gypsy: cleared by cards, stable 12. Seizure d/o: cont home meds 13. MIGUEL on CKD: fluids and monitor SUBJECTIVE :  
 
GERI overnight. Elevated Na this Am given small bolus of D5 and recheck improved but back to baseline. Long conversation with anesthesia and family about delay versus moving forward with surgery. Continues to have pain in left leg. IVC filter placed yesterday w/o event. OBJECTIVE :  
 
Vitals:  
 12/29/18 0321 12/29/18 0643 12/29/18 1056 12/29/18 1420 BP: 113/81 156/90 144/90 (!) 126/104 Pulse: 93 94 86 (!) 113 Resp: 18 15 19 20 Temp: 98.1 °F (36.7 °C) 98.8 °F (37.1 °C) 98.1 °F (36.7 °C) 98.9 °F (37.2 °C) SpO2: 92% 96% 96% 90% Weight:      
Height:      
 
GEN: Thin cacectic male RESP: Nonlaborred breathing CARD: RRR, good peripheral perfusion LLE: laying on the hip in a fetal position, leg min shortened and ER.pain with log roll. +EHL/FHL, DF/PF ankle. SILT all dermatomes grossly DP2+ 
  
 
 
 
 
 
Labs: 
Recent Labs  
  12/29/18 
1304 12/29/18 
6603 HGB  --  10.0* HCT  --  32.1* * 154* K 4.3 4.0  
* 117* CO2 26 29 BUN 29* 27* CREA 2.10* 2.17* * 114* Patient mobility Cameron Edwards. Tamika Nunez, [address] [facility] 
Cell [phone] Medical Staff : Mary Carmen Coronel Office : [phone]

## 2018-12-29 NOTE — PERIOP NOTES
TRANSFER - OUT REPORT: 
 
Verbal report given to codie mauricio(name) on Danny Curling  being transferred to [address] [place] [address]) for routine post - op Report consisted of patients Situation, Background, Assessment and  
Recommendations(SBAR). Information from the following report(s) SBAR, OR Summary, Procedure Summary, Intake/Output and MAR was reviewed with the receiving nurse. Lines:  
Peripheral IV 12/27/18 Left Antecubital (Active) Site Assessment Clean, dry, & intact 12/29/2018  5:29 PM  
Phlebitis Assessment 0 12/29/2018  5:29 PM  
Infiltration Assessment 0 12/29/2018  5:29 PM  
Dressing Status Clean, dry, & intact 12/29/2018  5:29 PM  
Dressing Type Transparent 12/29/2018  5:29 PM  
Hub Color/Line Status Pink;Capped 12/29/2018  5:29 PM  
Action Taken Open ports on tubing capped 12/27/2018  4:30 PM  
Alcohol Cap Used Yes 12/29/2018  5:29 PM  
   
Peripheral IV 12/27/18 Distal;Lower;Right; Anterior Arm (Active) Site Assessment Clean, dry, & intact 12/29/2018  5:15 PM  
Phlebitis Assessment 0 12/29/2018  5:15 PM  
Infiltration Assessment 0 12/29/2018  5:15 PM  
Dressing Status Clean, dry, & intact; Occlusive 12/29/2018  5:15 PM  
Dressing Type Tape;Transparent 12/29/2018  5:15 PM  
Hub Color/Line Status Pink; Infusing 12/29/2018  5:15 PM  
Action Taken Open ports on tubing capped 12/27/2018  6:10 PM  
Alcohol Cap Used Yes 12/29/2018 11:19 AM  
  
 
Opportunity for questions and clarification was provided. Patient transported with: 
 O2 @ 2 liters Registered Nurse

## 2018-12-29 NOTE — ANESTHESIA POSTPROCEDURE EVALUATION
Procedure(s): HIP HEMIARTHROPLASTY POSTERIOR LEFT. Anesthesia Post Evaluation Multimodal analgesia: multimodal analgesia not used between 6 hours prior to anesthesia start to PACU discharge Patient location during evaluation: PACU Patient participation: complete - patient participated Level of consciousness: awake Pain management: adequate Airway patency: patent Anesthetic complications: no 
Cardiovascular status: acceptable, blood pressure returned to baseline and hemodynamically stable Respiratory status: acceptable Hydration status: acceptable Visit Vitals BP (!) 135/92 Pulse 84 Temp 36.5 °C (97.7 °F) Resp 13 Ht 6' (1.829 m) Wt 82.6 kg (182 lb) SpO2 97% BMI 24.68 kg/m²

## 2018-12-29 NOTE — PROGRESS NOTES
Daily Progress Note: 12/29/2018 Malcolm Vick DO Assessment/Plan: 1. Closed displaced fracture of left femoral neck (HCC) (12/27/2018)/ Acute mild T1 compression fracture. - pain management 
--to OR this am with ortho 2. Regarding medical clearance. - cleared by cardiology 3. Acute on chronic renal failure ([facility] (12/27/2018). Cr improving 
-  Continue IVF 
- monitor. 4.  Hypernatremia (12/27/2018). persists- anesthesia would prefer it be lower before surgery 
- will bolus gently with D5 and then transition to D5 at 100cc/hr 
-recheck level at 1pm.  INB will consult renal. 
  
5.  UTI (urinary tract infection) (12/27/2018)(POA). -  ceftriaxone - Urine cx: 20,000IU of GNR 
  
6. Thrombocytopenia ([facility] (12/27/2018), improved - Monitor.  
  
7. Anxiety and depression (12/27/2018). - Continue home meds  
  
8. BPH (benign prostatic hyperplasia) (12/27/2018). - Continue home meds  
  
9. GERD (gastroesophageal reflux disease) (12/27/2018). - On PPI  
  
10. HTN (hypertension) (12/27/2018). - Continue metoprolol and imdur  
  
11. Seizure ([facility] (12/27/2018). - On vimpat and valproic acid. - Seizure precaution. Check valproic acid.  
   
12. Fall (12/27/2018). Fall precaution  
  
13. Dementia (12/27/2018). - Continue aricept and namenda. - Supportive care  
  
14. Protein- calorie malnutrition.  
- Nutrition consult.  
  
15. Hypertroponinemia [not clinically relevant], has stabilized. Not MI, likely heart strain. 
- Has been cleared by Cardiology 16. DVT BLE 
- IVC filter placed 12/28 Problem List: 
Problem List as of 12/29/2018 Date Reviewed: 12/27/2018 Codes Class Noted - Resolved Hip fracture ([facility] ICD-10-CM: D52.162U ICD-9-CM: 820.8  12/27/2018 - Present Thrombocytopenia ([facility] ICD-10-CM: D69.6 ICD-9-CM: 287.5  12/27/2018 - Present Anxiety and depression ICD-10-CM: F41.9, F32.9 ICD-9-CM: 300.00, 311  12/27/2018 - Present BPH (benign prostatic hyperplasia) ICD-10-CM: N40.0 ICD-9-CM: 600.00  12/27/2018 - Present GERD (gastroesophageal reflux disease) ICD-10-CM: K21.9 ICD-9-CM: 530.81  12/27/2018 - Present HTN (hypertension) ICD-10-CM: I10 
ICD-9-CM: 401.9  12/27/2018 - Present Seizure ([facility] ICD-10-CM: R56.9 ICD-9-CM: 780.39  12/27/2018 - Present UTI (urinary tract infection) ICD-10-CM: N39.0 ICD-9-CM: 599.0  12/27/2018 - Present Hypernatremia ICD-10-CM: E87.0 ICD-9-CM: 276.0  12/27/2018 - Present Acute on chronic renal failure (HCC) ICD-10-CM: N17.9, N18.9 ICD-9-CM: 584.9, 585.9  12/27/2018 - Present * (Principal) Closed displaced fracture of left femoral neck ([facility] ICD-10-CM: R77.579O ICD-9-CM: 820.8  12/27/2018 - Present Fall ICD-10-CM: W19. Phillip Tobin ICD-9-CM: E888.9  12/27/2018 - Present Dementia ICD-10-CM: F03.90 ICD-9-CM: 294.20  12/27/2018 - Present T1 vertebral fracture ([facility] ICD-10-CM: M09.995M 
ICD-9-CM: 805.2  12/27/2018 - Present Trimalleolar fracture of right ankle ICD-10-CM: Y22.795W ICD-9-CM: 824.6  10/24/2012 - Present HPI:  
Mr. Heather Hassan is a 68 y.o.  male who is admitted with femoral fracture. Mr. Heather Hassan with PMH of anxiety, depression, GERD, SZD, fall presented to ER after a fall yesterday evening. Pt is in a SNF rehab center since last week, where he was admitted due to fall. Has been falling frequently recently. Yesterday evening, he fell and hit his head and LT hip. This morning, he was found by nursing staff. Pt has dementia and unable to provide Hx.  
 
12/28: Appears comfortable. Confused this am but does point to the left leg when asked about pain. Has been cleared by cardiology for surgery. To have IVC filter placed before surgery. 12/29: IVC filter placed yesterday by [facility] for L hip ORIF today at 2pm.  Pt feeling ok. No current complaints. NPO this AM. Review of Systems:  
Review of systems not obtained due to patient factors. Objective:  
Physical Exam:  
 
Visit Vitals /90 Pulse 94 Temp 98.8 °F (37.1 °C) Resp 15 Ht 6' (1.829 m) Wt 82.6 kg (182 lb) SpO2 96% BMI 24.68 kg/m² O2 Flow Rate (L/min): 2 l/min O2 Device: Room air Temp (24hrs), Av.5 °F (36.9 °C), Min:98.1 °F (36.7 °C), Max:98.8 °F (37.1 °C) 
  701 - 1900 In: 100 [P.O.:100] Out: -    1901 - 700 In: -  
Out: [facility] General:  Alert, thin frail elderly male in NAD Head:  Normocephalic, without obvious abnormality, atraumatic. Eyes:  Conjunctivae/corneas clear. PERRL, EOMs intact. Nose: Nares normal. Septum midline. Mucosa normal. No drainage or sinus tenderness. Throat: Lips, mucosa, and tongue dry. Teeth and gums normal.  
Neck: Dressing over R neck C/D. Supple, symmetrical, trachea midline, no adenopathy, thyroid: no enlargement/tenderness/nodules, no carotid bruit and no JVD. Back:   Symmetric, no curvature. ROM normal. No CVA tenderness. Lungs:   Clear to auscultation bilaterally. Chest wall:  No tenderness or deformity. Heart:  Regular rate and rhythm, S1, S2 normal, no murmur, click, rub or gallop. Abdomen:   Soft, non-tender. Bowel sounds normal. No masses,  No organomegaly. Extremities: LLE shortened, no cyanosis or edema. No calf tenderness or cords. Pulses: 2+ and symmetric all extremities. Skin: Skin color, texture, turgor normal. No rashes or lesions Neurologic: CNII-XII intact. Alert and oriented X 1. Fine motor of hands and fingers normal.   equal.   Sensation grossly normal to touch. Data Review:  
   
Recent Days: 
Recent Labs  
  18 
0718 18 
0400 18 
1120 WBC 6.7 8.6 10.0 HGB 10.0* 11.0* 11.0*  
HCT 32.1* 35.3* 35.5*  134* 146* Recent Labs  
  18 
0718 18 0400 12/27/18 
1120 * 151* 150*  
K 4.0 4.0 3.9 * 115* 113* CO2 29 27 27 * 126* 124* BUN 27* 27* 31* CREA 2.17* 2.36* 2.64* CA 8.7 9.1 8.9 MG  --   --  1.8 ALB 2.0*  --  2.3* TBILI 0.2  --  0.2 SGOT 15  --  17 ALT 14  --  16 No results for input(s): PH, PCO2, PO2, HCO3, FIO2 in the last 72 hours. 24 Hour Results: 
Recent Results (from the past 24 hour(s)) CBC WITH AUTOMATED DIFF Collection Time: 12/29/18  7:18 AM  
Result Value Ref Range WBC 6.7 4.1 - 11.1 K/uL  
 RBC 3.42 (L) 4.10 - 5.70 M/uL  
 HGB 10.0 (L) 12.1 - 17.0 g/dL HCT 32.1 (L) 36.6 - 50.3 % MCV 93.9 80.0 - 99.0 FL  
 MCH 29.2 26.0 - 34.0 PG  
 MCHC 31.2 30.0 - 36.5 g/dL  
 RDW 14.6 (H) 11.5 - 14.5 % PLATELET 773 045 - 448 K/uL MPV 11.8 8.9 - 12.9 FL  
 NRBC 0.0 0  WBC ABSOLUTE NRBC 0.00 0.00 - 0.01 K/uL NEUTROPHILS 60 32 - 75 % LYMPHOCYTES 17 12 - 49 % MONOCYTES 10 5 - 13 % EOSINOPHILS 11 (H) 0 - 7 % BASOPHILS 0 0 - 1 % METAMYELOCYTES 1 (H) 0 % MYELOCYTES 1 (H) 0 % IMMATURE GRANULOCYTES 0 %  
 ABS. NEUTROPHILS 4.0 1.8 - 8.0 K/UL  
 ABS. LYMPHOCYTES 1.1 0.8 - 3.5 K/UL  
 ABS. MONOCYTES 0.7 0.0 - 1.0 K/UL  
 ABS. EOSINOPHILS 0.7 (H) 0.0 - 0.4 K/UL  
 ABS. BASOPHILS 0.0 0.0 - 0.1 K/UL  
 ABS. IMM. GRANS. 0.0 K/UL  
 DF MANUAL    
 RBC COMMENTS OVALOCYTES PRESENT 
    
METABOLIC PANEL, COMPREHENSIVE Collection Time: 12/29/18  7:18 AM  
Result Value Ref Range Sodium 154 (H) 136 - 145 mmol/L Potassium 4.0 3.5 - 5.1 mmol/L Chloride 117 (H) 97 - 108 mmol/L  
 CO2 29 21 - 32 mmol/L Anion gap 8 5 - 15 mmol/L Glucose 114 (H) 65 - 100 mg/dL BUN 27 (H) 6 - 20 MG/DL Creatinine 2.17 (H) 0.70 - 1.30 MG/DL  
 BUN/Creatinine ratio 12 12 - 20 GFR est AA 36 (L) >60 ml/min/1.73m2 GFR est non-AA 30 (L) >60 ml/min/1.73m2 Calcium 8.7 8.5 - 10.1 MG/DL  Bilirubin, total 0.2 0.2 - 1.0 MG/DL  
 ALT (SGPT) 14 12 - 78 U/L  
 AST (SGOT) 15 15 - 37 U/L Alk. phosphatase 59 45 - 117 U/L Protein, total 5.7 (L) 6.4 - 8.2 g/dL Albumin 2.0 (L) 3.5 - 5.0 g/dL Globulin 3.7 2.0 - 4.0 g/dL A-G Ratio 0.5 (L) 1.1 - 2.2 Medications reviewed Current Facility-Administered Medications Medication Dose Route Frequency  morphine injection 2 mg  2 mg IntraVENous Q4H PRN  
 sodium chloride (NS) flush 5-10 mL  5-10 mL IntraVENous Q8H  
 sodium chloride (NS) flush 5-10 mL  5-10 mL IntraVENous PRN  
 cefTRIAXone (ROCEPHIN) 1 g in 0.9% sodium chloride (MBP/ADV) 50 mL  1 g IntraVENous Q24H  
 dextrose 5 % - 0.45% NaCl infusion  100 mL/hr IntraVENous CONTINUOUS  
 HYDROcodone-acetaminophen (NORCO) 5-325 mg per tablet 1 Tab  1 Tab Oral Q6H PRN  
 acetaminophen (TYLENOL) tablet 650 mg  650 mg Oral Q6H PRN  
 albuterol (PROVENTIL VENTOLIN) nebulizer solution 2.5 mg  2.5 mg Nebulization Q4H PRN  
 atorvastatin (LIPITOR) tablet 40 mg  40 mg Oral DAILY  donepezil (ARICEPT) tablet 10 mg  10 mg Oral QHS  pantoprazole (PROTONIX) tablet 40 mg  40 mg Oral DAILY  isosorbide mononitrate ER (IMDUR) tablet 30 mg  30 mg Oral DAILY  lacosamide (VIMPAT) tablet 100 mg  100 mg Oral BID  memantine (NAMENDA) tablet 10 mg  10 mg Oral QHS  metoprolol tartrate (LOPRESSOR) tablet 12.5 mg  12.5 mg Oral BID  
 oxybutynin chloride XL (DITROPAN XL) tablet 10 mg  10 mg Oral DAILY  valproic acid (DEPAKENE) capsule 500 mg  500 mg Oral Q12H Care Plan discussed with: Patient/Family and Nurse Total time spent with patient and review of records: 30 minutes.  
 
Catrina Marcano MD

## 2018-12-29 NOTE — PROGRESS NOTES
1050: Spoke to Ed in preop. Anesthesia does not want to proceed with NA at 154. Notified Dr. Dara Barlow. Orders for D5 bolus of 250mL and fluids changed to D5 at 100mL. Will repeat lab at 1300.  
 
1155: Notified Ed RN of plan. 1335: Notifed Dr. Carloz Thompson of Na result. Will speak to OR Bedside shift change report given to [address] [place] [address] (oncoming nurse) by Daren Nicholas RN (offgoing nurse). Report included the following information SBAR, Kardex, Intake/Output, MAR and Recent Results.

## 2018-12-30 ENCOUNTER — APPOINTMENT (OUTPATIENT)
Dept: CT IMAGING | Age: 73
DRG: 470 | End: 2018-12-30
Attending: INTERNAL MEDICINE
Payer: MEDICARE

## 2018-12-30 LAB
ALBUMIN SERPL-MCNC: 1.8 G/DL (ref 3.5–5)
ALBUMIN SERPL-MCNC: 1.9 G/DL (ref 3.5–5)
ALBUMIN/GLOB SERPL: 0.5 {RATIO} (ref 1.1–2.2)
ALP SERPL-CCNC: 55 U/L (ref 45–117)
ALT SERPL-CCNC: 9 U/L (ref 12–78)
ANION GAP SERPL CALC-SCNC: 11 MMOL/L (ref 5–15)
ANION GAP SERPL CALC-SCNC: 9 MMOL/L (ref 5–15)
AST SERPL-CCNC: 16 U/L (ref 15–37)
BASOPHILS # BLD: 0 K/UL (ref 0–0.1)
BASOPHILS NFR BLD: 0 % (ref 0–1)
BILIRUB SERPL-MCNC: 0.3 MG/DL (ref 0.2–1)
BUN SERPL-MCNC: 27 MG/DL (ref 6–20)
BUN SERPL-MCNC: 27 MG/DL (ref 6–20)
BUN/CREAT SERPL: 11 (ref 12–20)
BUN/CREAT SERPL: 13 (ref 12–20)
CALCIUM SERPL-MCNC: 8.3 MG/DL (ref 8.5–10.1)
CALCIUM SERPL-MCNC: 8.4 MG/DL (ref 8.5–10.1)
CHLORIDE SERPL-SCNC: 106 MMOL/L (ref 97–108)
CHLORIDE SERPL-SCNC: 112 MMOL/L (ref 97–108)
CO2 SERPL-SCNC: 24 MMOL/L (ref 21–32)
CO2 SERPL-SCNC: 26 MMOL/L (ref 21–32)
CREAT SERPL-MCNC: 2.07 MG/DL (ref 0.7–1.3)
CREAT SERPL-MCNC: 2.37 MG/DL (ref 0.7–1.3)
DIFFERENTIAL METHOD BLD: ABNORMAL
EOSINOPHIL # BLD: 0.7 K/UL (ref 0–0.4)
EOSINOPHIL NFR BLD: 9 % (ref 0–7)
ERYTHROCYTE [DISTWIDTH] IN BLOOD BY AUTOMATED COUNT: 14.5 % (ref 11.5–14.5)
GLOBULIN SER CALC-MCNC: 3.8 G/DL (ref 2–4)
GLUCOSE BLD STRIP.AUTO-MCNC: 146 MG/DL (ref 65–100)
GLUCOSE SERPL-MCNC: 131 MG/DL (ref 65–100)
GLUCOSE SERPL-MCNC: 149 MG/DL (ref 65–100)
HCT VFR BLD AUTO: 30.4 % (ref 36.6–50.3)
HCT VFR BLD AUTO: 30.5 % (ref 36.6–50.3)
HGB BLD-MCNC: 9.3 G/DL (ref 12.1–17)
HGB BLD-MCNC: 9.4 G/DL (ref 12.1–17)
IMM GRANULOCYTES # BLD: 0.1 K/UL (ref 0–0.04)
IMM GRANULOCYTES NFR BLD AUTO: 1 % (ref 0–0.5)
LYMPHOCYTES # BLD: 0.8 K/UL (ref 0.8–3.5)
LYMPHOCYTES NFR BLD: 12 % (ref 12–49)
MAGNESIUM SERPL-MCNC: 1.6 MG/DL (ref 1.6–2.4)
MCH RBC QN AUTO: 28.7 PG (ref 26–34)
MCHC RBC AUTO-ENTMCNC: 30.9 G/DL (ref 30–36.5)
MCV RBC AUTO: 92.7 FL (ref 80–99)
MONOCYTES # BLD: 0.9 K/UL (ref 0–1)
MONOCYTES NFR BLD: 12 % (ref 5–13)
NEUTS SEG # BLD: 4.7 K/UL (ref 1.8–8)
NEUTS SEG NFR BLD: 66 % (ref 32–75)
NRBC # BLD: 0 K/UL (ref 0–0.01)
NRBC BLD-RTO: 0 PER 100 WBC
PHOSPHATE SERPL-MCNC: 3 MG/DL (ref 2.6–4.7)
PHOSPHATE SERPL-MCNC: 3 MG/DL (ref 2.6–4.7)
PLATELET # BLD AUTO: 164 K/UL (ref 150–400)
PMV BLD AUTO: 11.7 FL (ref 8.9–12.9)
POTASSIUM SERPL-SCNC: 3.7 MMOL/L (ref 3.5–5.1)
POTASSIUM SERPL-SCNC: 3.7 MMOL/L (ref 3.5–5.1)
PROT SERPL-MCNC: 5.6 G/DL (ref 6.4–8.2)
RBC # BLD AUTO: 3.28 M/UL (ref 4.1–5.7)
SERVICE CMNT-IMP: ABNORMAL
SODIUM SERPL-SCNC: 141 MMOL/L (ref 136–145)
SODIUM SERPL-SCNC: 147 MMOL/L (ref 136–145)
TROPONIN I SERPL-MCNC: <0.05 NG/ML
VALPROATE SERPL-MCNC: 55 UG/ML (ref 50–100)
WBC # BLD AUTO: 7.2 K/UL (ref 4.1–11.1)

## 2018-12-30 PROCEDURE — 85018 HEMOGLOBIN: CPT

## 2018-12-30 PROCEDURE — 77010033678 HC OXYGEN DAILY

## 2018-12-30 PROCEDURE — 97535 SELF CARE MNGMENT TRAINING: CPT

## 2018-12-30 PROCEDURE — 94760 N-INVAS EAR/PLS OXIMETRY 1: CPT

## 2018-12-30 PROCEDURE — 97165 OT EVAL LOW COMPLEX 30 MIN: CPT

## 2018-12-30 PROCEDURE — 74011250636 HC RX REV CODE- 250/636: Performed by: INTERNAL MEDICINE

## 2018-12-30 PROCEDURE — 74011250637 HC RX REV CODE- 250/637: Performed by: PHYSICIAN ASSISTANT

## 2018-12-30 PROCEDURE — 70450 CT HEAD/BRAIN W/O DYE: CPT

## 2018-12-30 PROCEDURE — 80069 RENAL FUNCTION PANEL: CPT

## 2018-12-30 PROCEDURE — 80053 COMPREHEN METABOLIC PANEL: CPT

## 2018-12-30 PROCEDURE — 93005 ELECTROCARDIOGRAM TRACING: CPT

## 2018-12-30 PROCEDURE — 74011000258 HC RX REV CODE- 258: Performed by: FAMILY MEDICINE

## 2018-12-30 PROCEDURE — 97161 PT EVAL LOW COMPLEX 20 MIN: CPT

## 2018-12-30 PROCEDURE — 74011000250 HC RX REV CODE- 250: Performed by: ORTHOPAEDIC SURGERY

## 2018-12-30 PROCEDURE — 74011250637 HC RX REV CODE- 250/637: Performed by: INTERNAL MEDICINE

## 2018-12-30 PROCEDURE — 97530 THERAPEUTIC ACTIVITIES: CPT

## 2018-12-30 PROCEDURE — 74011250636 HC RX REV CODE- 250/636: Performed by: ORTHOPAEDIC SURGERY

## 2018-12-30 PROCEDURE — 84100 ASSAY OF PHOSPHORUS: CPT

## 2018-12-30 PROCEDURE — 74011000258 HC RX REV CODE- 258: Performed by: INTERNAL MEDICINE

## 2018-12-30 PROCEDURE — 84484 ASSAY OF TROPONIN QUANT: CPT

## 2018-12-30 PROCEDURE — 74011250637 HC RX REV CODE- 250/637: Performed by: ORTHOPAEDIC SURGERY

## 2018-12-30 PROCEDURE — 80164 ASSAY DIPROPYLACETIC ACD TOT: CPT

## 2018-12-30 PROCEDURE — 74011250636 HC RX REV CODE- 250/636: Performed by: FAMILY MEDICINE

## 2018-12-30 PROCEDURE — 95816 EEG AWAKE AND DROWSY: CPT | Performed by: INTERNAL MEDICINE

## 2018-12-30 PROCEDURE — 36415 COLL VENOUS BLD VENIPUNCTURE: CPT

## 2018-12-30 PROCEDURE — 83735 ASSAY OF MAGNESIUM: CPT

## 2018-12-30 PROCEDURE — 97166 OT EVAL MOD COMPLEX 45 MIN: CPT

## 2018-12-30 PROCEDURE — 74011000250 HC RX REV CODE- 250: Performed by: FAMILY MEDICINE

## 2018-12-30 PROCEDURE — 82962 GLUCOSE BLOOD TEST: CPT

## 2018-12-30 PROCEDURE — 65660000000 HC RM CCU STEPDOWN

## 2018-12-30 PROCEDURE — 85025 COMPLETE CBC W/AUTO DIFF WBC: CPT

## 2018-12-30 RX ADMIN — METOPROLOL TARTRATE 12.5 MG: 25 TABLET ORAL at 09:02

## 2018-12-30 RX ADMIN — Medication 10 ML: at 22:29

## 2018-12-30 RX ADMIN — OYSTER SHELL CALCIUM WITH VITAMIN D 1 TABLET: 500; 200 TABLET, FILM COATED ORAL at 09:03

## 2018-12-30 RX ADMIN — Medication 10 ML: at 06:15

## 2018-12-30 RX ADMIN — DEXTROSE MONOHYDRATE 100 ML/HR: 5 INJECTION, SOLUTION INTRAVENOUS at 06:19

## 2018-12-30 RX ADMIN — ISOSORBIDE MONONITRATE 30 MG: 30 TABLET, EXTENDED RELEASE ORAL at 04:25

## 2018-12-30 RX ADMIN — OXYBUTYNIN CHLORIDE 10 MG: 5 TABLET, EXTENDED RELEASE ORAL at 09:03

## 2018-12-30 RX ADMIN — SODIUM CHLORIDE 1000 ML: 900 INJECTION, SOLUTION INTRAVENOUS at 15:32

## 2018-12-30 RX ADMIN — ATORVASTATIN CALCIUM 40 MG: 20 TABLET, FILM COATED ORAL at 09:03

## 2018-12-30 RX ADMIN — ACETAMINOPHEN 650 MG: 325 TABLET, FILM COATED ORAL at 06:14

## 2018-12-30 RX ADMIN — VALPROATE SODIUM 250 MG: 100 INJECTION, SOLUTION INTRAVENOUS at 22:20

## 2018-12-30 RX ADMIN — HEPARIN SODIUM 5000 UNITS: 5000 INJECTION INTRAVENOUS; SUBCUTANEOUS at 17:45

## 2018-12-30 RX ADMIN — ACETAMINOPHEN 650 MG: 325 TABLET, FILM COATED ORAL at 11:23

## 2018-12-30 RX ADMIN — HEPARIN SODIUM 5000 UNITS: 5000 INJECTION INTRAVENOUS; SUBCUTANEOUS at 09:03

## 2018-12-30 RX ADMIN — CEFTRIAXONE SODIUM 1 G: 1 INJECTION, POWDER, FOR SOLUTION INTRAMUSCULAR; INTRAVENOUS at 11:26

## 2018-12-30 RX ADMIN — ISOSORBIDE MONONITRATE 30 MG: 30 TABLET, EXTENDED RELEASE ORAL at 09:03

## 2018-12-30 RX ADMIN — POLYETHYLENE GLYCOL 3350 17 G: 17 POWDER, FOR SOLUTION ORAL at 09:02

## 2018-12-30 RX ADMIN — METOPROLOL TARTRATE 12.5 MG: 25 TABLET ORAL at 04:25

## 2018-12-30 RX ADMIN — OYSTER SHELL CALCIUM WITH VITAMIN D 1 TABLET: 500; 200 TABLET, FILM COATED ORAL at 11:26

## 2018-12-30 RX ADMIN — DOCUSATE SODIUM AND SENNOSIDES 1 TABLET: 8.6; 5 TABLET, FILM COATED ORAL at 09:03

## 2018-12-30 RX ADMIN — PANTOPRAZOLE SODIUM 40 MG: 40 TABLET, DELAYED RELEASE ORAL at 09:02

## 2018-12-30 RX ADMIN — DEXTROSE MONOHYDRATE 100 ML/HR: 5 INJECTION, SOLUTION INTRAVENOUS at 19:19

## 2018-12-30 RX ADMIN — HYDROCODONE BITARTRATE AND ACETAMINOPHEN 1 TABLET: 5; 325 TABLET ORAL at 01:09

## 2018-12-30 RX ADMIN — VALPROIC ACID 500 MG: 250 CAPSULE, LIQUID FILLED ORAL at 09:03

## 2018-12-30 RX ADMIN — LACOSAMIDE 100 MG: 100 TABLET, FILM COATED ORAL at 09:03

## 2018-12-30 NOTE — PROGRESS NOTES
TRANSFER - OUT REPORT: 
 
Verbal report given to Unknown ELSA Christie(name) on Jailene Barajas  being transferred to [address] [place] [address] room 329(unit) for routine progression of care Report consisted of patients Situation, Background, Assessment and  
Recommendations(SBAR). Information from the following report(s) SBAR, Kardex and MAR was reviewed with the receiving nurse. Lines:  
Peripheral IV 12/27/18 Left Antecubital (Active) Site Assessment Clean, dry, & intact 12/30/2018  9:02 AM  
Phlebitis Assessment 0 12/30/2018  9:02 AM  
Infiltration Assessment 0 12/30/2018  9:02 AM  
Dressing Status Clean, dry, & intact 12/30/2018  9:02 AM  
Dressing Type Transparent 12/30/2018  9:02 AM  
Hub Color/Line Status Pink;Capped 12/30/2018  9:02 AM  
Action Taken Open ports on tubing capped 12/29/2018  8:22 PM  
Alcohol Cap Used Yes 12/29/2018  8:22 PM  
   
Peripheral IV 12/27/18 Distal;Lower;Right; Anterior Arm (Active) Site Assessment Clean, dry, & intact 12/30/2018  9:02 AM  
Phlebitis Assessment 0 12/30/2018  9:02 AM  
Infiltration Assessment 0 12/30/2018  9:02 AM  
Dressing Status Clean, dry, & intact 12/30/2018  9:02 AM  
Dressing Type Transparent 12/30/2018  9:02 AM  
Hub Color/Line Status Pink; Infusing 12/30/2018  9:02 AM  
Action Taken Open ports on tubing capped 12/29/2018  8:22 PM  
Alcohol Cap Used Yes 12/29/2018  8:22 PM  
  
 
Opportunity for questions and clarification was provided.

## 2018-12-30 NOTE — OP NOTES
OPERATIVE REPORT     Admit Date: 12/27/2018  Admit Diagnosis: Hip fracture ([facility]  Hip fracture ([facility]    Date of Procedure: 12/29/2018   Preoperative Diagnosis: LEFT DISPLACED FEMORAL NECK FRACTURE  Postoperative Diagnosis: FEMORAL NECK FRACTURE    Procedure: Procedure(s):  HIP HEMIARTHROPLASTY POSTERIOR LEFT  Surgeon: Uriel Gordon MD  Assistant(s): Valeda Schirmer, LPN  Anesthesia: General   Estimated Blood Loss: 300cc  Specimens: * No specimens in log *   Complications: None    INDICATIONS:    Yuly Ca is a patient who sustained a femoral neck fracture and was indicated for bipolar replacement. We discussed risks, alternatives and expectations prior to surgery. The patient was seen for medical clearance prior to the procedure. His case was complicated by prior to admission bilateral lower Extremity DVTs that required delay of surgical intervention and placement of IVC filter. Also he had hypernatremia on admission this was managed medically but did not have significant decrease actually had slightly elevation on the day of surgery Re and was Re checked back to stable level as admission and a long conversation was had with anesthesia as well as with patient's family regarding further delay of surgery versus moving forward with surgical intervention understanding the risks and benefits of surgical way once this was thoroughly understood the family elected to move forward with surgical intervention. PROCEDURE IN DETAIL:   The patient had the proper site initialized and their questions were answered prior to surgery. The patient was seen and cleared for surgery by primary care. The patient underwent general endotracheal anesthesia, was positioned on the operating room table in the lateral decubitus position. The limb was prepped and draped in a sterile fashion. Prior to the start of the procedure, an appropriate timeout was performed.      Utilizing a posterolateral incision centered over the posterior 1/3 of the greater trochanter, this was taken down through skin and subcutaneous tissue. The iliotibial band and gluteus fascia was identified and sharply incised in-line with the incision. The  short external rotators were exposed. These were taken down along with the capsule as a unit off the neck and tagged with an #2 Ethibond suture. A neck cut was performed according to the implant geometry with the head and neck segment removed. A trial head was evaluated and found to be appropriately sized. Evaluation of the acetabulum demonstrated   Intact acetabulum without significant cartilage wear or damage the labrum. The femur was then exposed. Systematic broaching was performed to gain good cortical contact with the broach with a good axial and rotational fit. There was very good overall fit and fill from anterior to posterior and medial to lateral positions. Rotation of the trial was quite stable. The canal was then copiously irrigated with normal saline. The final femoral implant was placed and the trial neck reduction was performed with very good overall stability at both 45 degrees and 90 degrees of flexion and appropriate shuck and tension with the hip in extension. Leg lengths were felt to be equal.         The femoral neck was cleared of all debris. The final femoral head was impacted into place and reduced. The capsule was repaired with an ethibond suture back to the insertion of the gluteus medius and through a single bone tunnel. It had very good overall stability. Following that the wound was copiously irrigated with normal saline. Vancomycin powder 1 gram was placed into the wound and then attention was turned to closing. #2 Stratafix were used to close the iliotibial band and  the gluteus fascia and then the skin was closed with 2-0 monocryl and 4-0 Monocryl barbed suture. We had very good overall closure. Dermabond was applied and a sterile dressing was applied.  The patient was placed on an abduction pillow, extubated, and taken to the recovery room in stable condition. IMPLANTS :     Implant Name Type Inv.  Item Serial No.  Lot No. LRB No. Used Action   STEM FEM DUOFIX SZ 8 STD OFF -- [place] - SN/A  STEM FEM DUOFIX SZ 8 STD OFF -- [facility] [facility]A [facility] [id number] Left 1 Implanted   HEAD FEM [facility] [id number] SHAHEED --  - SN/A  HEAD FEM [facility] [id number] SHAHEED --  N/A [facility] [id number] Left 1 Implanted   HEAD FEM 28MM +5MM NK --  - SN/A  HEAD FEM 28MM +5MM NK --  N/A [facility] [id number] Left 1 Implanted         Ivone Green MD

## 2018-12-30 NOTE — PROGRESS NOTES
Bedside shift change report given to Gabrielle Castro RN (oncoming nurse) by Zohreh Carter RN (offgoing nurse). Report included the following information SBAR, Kardex and MAR.

## 2018-12-30 NOTE — PROGRESS NOTES
Problem: Mobility Impaired (Adult and Pediatric) Goal: *Acute Goals and Plan of Care (Insert Text) Physical Therapy Goals Initiated 12/30/2018 1. Patient will move from supine to sit and sit to supine  in bed with moderate assistance  within 7 day(s). 2.  Patient will transfer from bed to chair and chair to bed with moderate assistance  using the least restrictive device within 7 day(s). 3.  Patient will perform sit to stand with moderate assistance  within 7 day(s). 4.  Patient will ambulate with moderate assistance  for 5 feet with the least restrictive device within 7 day(s). physical Therapy EVALUATION Patient: Magda Bhakta (32 y.o. male) Date: 12/30/2018 Primary Diagnosis: Hip fracture ([address] Hip fracture ([address] Procedure(s) (LRB): HIP HEMIARTHROPLASTY POSTERIOR LEFT (Left) 1 Day Post-Op Precautions: Universal, Abduction pillow in place in bed ASSESSMENT : 
Based on the objective data described below, the patient presents with decreased range of motion, decreased strength, and decreased functional mobility status post left posterior hip hemiarthroplasty. He had a fall prior to admit, injuring his left hip and his head. Difficult obtaining history from the patient - sometimes he doesn't answer, sometimes the answer changes when asked the same question by a different person. He does report pain with moving his left leg, unable to state a number. Follows some commands, not others. Per the chart patient was at [facility] prior to admit, patient unable to state how long he had been there. Received patient in bed with the abduction pillow in place. Performed transfer training for out of bed and for sit to stand. Attempted gait training with the rolling walker, however patient unable to take a step with right or left foot. Attempted weight shifting, verbal instruction, manual cues, and tapping foot - patient unable to step forward.   Bed moved and chair brought behind patient. Patient set up to eat and Occupational Therapy in the room with him. **noted a half hour after the patient had been up, a Code Blue was called on the patient and he was place back in the bed. Patient will benefit from skilled intervention to address the above impairments. Patients rehabilitation potential is considered to be Fair Factors which may influence rehabilitation potential include:  
[]         None noted 
[x]         Mental ability/status [x]         Medical condition 
[]         Home/family situation and support systems 
[]         Safety awareness [x]         Pain tolerance/management 
[]         Other: PLAN : 
Recommendations and Planned Interventions: 
[x]           Bed Mobility Training             []    Neuromuscular Re-Education 
[x]           Transfer Training                   []    Orthotic/Prosthetic Training 
[x]           Gait Training                         []    Modalities [x]           Therapeutic Exercises           []    Edema Management/Control 
[x]           Therapeutic Activities            [x]    Patient and Family Training/Education 
[]           Other (comment): Frequency/Duration: Patient will be followed by physical therapy  daily to address goals. Discharge Recommendations: [facility] Further Equipment Recommendations for Discharge: tbd SUBJECTIVE:  
Patient stated no. Milner Code asked -are you dizzy?] OBJECTIVE DATA SUMMARY:  
HISTORY:   
Past Medical History:  
Diagnosis Date  Anxiety  BPH (benign prostatic hyperplasia)  Depression  GERD (gastroesophageal reflux disease)  Hypertension  Seizures ([address] Past Surgical History:  
Procedure Laterality Date  HX COLONOSCOPY    
 HX HEENT    
 nasal septum  TN EGD DELIVER THERMAL ENERGY SPHNCTR/CARDIA GERD Prior Level of Function/Home Situation:  Difficult obtaining history from the patient - sometimes he doesn't answer, sometimes the answer changes when asked the same question by a different person. He does report pain with moving his left leg, unable to state a number. Follows some commands, not others. Per the chart patient was at [facility] prior to admit, patient unable to state how long he had been there. Personal factors and/or comorbidities impacting plan of care:  
 
Home Situation Home Environment: Skilled nursing facility One/Two Story Residence: One story Living Alone: No 
Support Systems: Skilled nursing facility Patient Expects to be Discharged to[de-identified] Skilled nursing facility Current DME Used/Available at Home: None EXAMINATION/PRESENTATION/DECISION MAKING: Critical Behavior: 
Neurologic State: Alert, Eyes open to stimulus Orientation Level: Oriented to person Cognition: Decreased attention/concentration, Decreased command following, Follows commands, Impaired decision making, Poor safety awareness Hearing: Auditory Auditory Impairment: Hard of hearing, bilateral 
Range Of Motion: 
AROM: Generally decreased, functional 
  
  
  
PROM: Generally decreased, functional 
  
  
  
Strength:   
Strength: Generally decreased, functional 
  
  
  
  
  
  
Tone & Sensation:  
Tone: Normal 
  
  
  
  
  
  
  
  
   
Coordination: 
Coordination: Generally decreased, functional 
Vision:  
  
Functional Mobility: 
Bed Mobility: 
  
Supine to Sit: Maximum assistance;Assist x2; Additional time Transfers: 
Sit to Stand: Moderate assistance;Assist x2; Additional time Stand to Sit: Moderate assistance; Additional time;Assist x2 Balance:  
Sitting: Intact Standing: Intact; With supportAmbulation/Gait Training:Distance (ft): 0 Feet (ft)(attempted several times to initiate a step, pt unable) Assistive Device: Walker, rolling;Gait belt Ambulation - Level of Assistance: Maximum assistance;Assist x2; Additional time Base of Support: Center of gravity altered Stairs: 
  
  
   
 
 
 
Functional Measure: 
Barthel Index: 
 
Bathin Bladder: 0 Bowels: 0 Groomin Dressin Feedin Mobility: 0 Stairs: 0 Toilet Use: 0 Transfer (Bed to Chair and Back): 5 Total: 10 Barthel and G-code impairment scale: 
Percentage of impairment CH 
0% CI 
1-19% CJ 
20-39% CK 
40-59% CL 
60-79% CM 
80-99% CN 
100% Barthel Score 0-100 100 99-80 79-60 59-40 20-39 1-19 
 0 Barthel Score 0-20 20 17-19 13-16 9-12 5-8 1-4 0 The Barthel ADL Index: Guidelines 1. The index should be used as a record of what a patient does, not as a record of what a patient could do. 2. The main aim is to establish degree of independence from any help, physical or verbal, however minor and for whatever reason. 3. The need for supervision renders the patient not independent. 4. A patient's performance should be established using the best available evidence. Asking the patient, friends/relatives and nurses are the usual sources, but direct observation and common sense are also important. However direct testing is not needed. 5. Usually the patient's performance over the preceding 24-48 hours is important, but occasionally longer periods will be relevant. 6. Middle categories imply that the patient supplies over 50 per cent of the effort. 7. Use of aids to be independent is allowed. Kaylyn Alexis., Barthel, D.W. (2939). Functional evaluation: the Barthel Index. [address] [facility] (14)2. BERNADETTE Castorena.TIMOTEO.F, [facility] Moorerra., [facility], [facility] (). Measuring the change indisability after inpatient rehabilitation; comparison of the responsiveness of the Barthel Index and Functional [place] Measure. Journal of Neurology, Neurosurgery, and Psychiatry, 66(4), 996-770.  
HERIBERTO Huerta, CAROLE Gorman, Paige Krueger M.A. (2004.) Assessment of post-stroke quality of life in cost-effectiveness studies: The usefulness of the Barthel Index and the EuroQoL-5D. [facility], 95, 258-55 G codes: In compliance with CMSs Claims Based Outcome Reporting, the following G-code set was chosen for this patient based on their primary functional limitation being treated: The outcome measure chosen to determine the severity of the functional limitation was the Barthel with a score of 10/100 which was correlated with the impairment scale. ? Mobility - Walking and Moving Around:  
  - CURRENT STATUS: CM - 80%-99% impaired, limited or restricted  - GOAL STATUS: CL - 60%-79% impaired, limited or restricted  - D/C STATUS:  ---------------To be determined--------------- Physical Therapy Evaluation Charge Determination History Examination Presentation Decision-Making MEDIUM  Complexity : 1-2 comorbidities / personal factors will impact the outcome/ POC  LOW Complexity : 1-2 Standardized tests and measures addressing body structure, function, activity limitation and / or participation in recreation  LOW Complexity : Stable, uncomplicated  LOW Complexity : FOTO score of  Based on the above components, the patient evaluation is determined to be of the following complexity level: LOW Pain: 
Pain Scale 1: Adult Nonverbal Pain Scale Pain Intensity 1: 0 Activity Tolerance:  
Limited by decrease command follow, pain, and weakness Please refer to the flowsheet for vital signs taken during this treatment. After treatment:  
[x]         Patient left in no apparent distress sitting up in chair 
[]         Patient left in no apparent distress in bed 
[x]         Call bell left within reach [x]         Nursing notified 
[]         Caregiver present [x]         Chair alarm activated COMMUNICATION/EDUCATION:  
The patients plan of care was discussed with: Occupational Therapist and Registered Nurse. 
[]         Fall prevention education was provided and the patient/caregiver indicated understanding. []         Patient/family have participated as able in goal setting and plan of care. []         Patient/family agree to work toward stated goals and plan of care. []         Patient understands intent and goals of therapy, but is neutral about his/her participation. [x]         Patient is unable to participate in goal setting and plan of care. Thank you for this referral. 
Page Hal, PT Time Calculation: 25 mins

## 2018-12-30 NOTE — PROGRESS NOTES
Responded to Code Blue on 4 Post Surg ORtho. No family was present for Mr. Katiana Funez. When able went to his bedside and provided silent prayer. Per Staff Mr. Katiana Funez is to be transferred to [facility].  available as able and/or needed. Visited by: Natalio Amanda, MS., [address] ([id number])

## 2018-12-30 NOTE — PROGRESS NOTES
Orthopaedic Progress Note Post Op day: 1 Day Post-Op 2018 10:26 AM  
 
Patient: Gretchen Issa MRN: [id number]  SSN: [id number] YOB: 1945  Age: 68 y.o. Sex: male Admit date:  2018 Date of Surgery:  2018 Procedures:  Procedure(s): HIP HEMIARTHROPLASTY POSTERIOR LEFT Admitting Physician:  Araseli Sinclair MD  
Surgeon:  Estelle Salazar) and Role: John Copeland MD - Primary Consulting Physician(s): Treatment Team: Attending Provider: Vini Martinez MD; Consulting Provider: Mirna Vasquez; Consulting Provider: Elizabeth Hudson MD; Consulting Provider: Izabel Caraballo MD; Care Manager: Suzanne Felix 
 
SUBJECTIVE: 
  
Gretchen Issa is a 68 y.o. male is 1 Day Post-Op s/p Procedure(s): HIP HEMIARTHROPLASTY POSTERIOR LEFT with an appropriate level of post-operative pain. No complaints of nausea, vomiting, dizziness, lightheadedness, chest pain, or shortness of breath. OBJECTIVE: 
 
  
Physical Exam: 
General: Alert, confused to surroundings, date, no distress. Respiratory: Respirations unlabored Neurological:  Neurovascular exam within normal limits. Motor: + DF/PF. Musculoskeletal: Calves soft, supple, non-tender upon palpation. Dressing/Wound:  Clean, dry and intact. No significant erythema or swelling. Vital Signs:  
  
 
Patient Vitals for the past 8 hrs: 
 BP Temp Pulse Resp SpO2  
18 0649 107/74 98.2 °F (36.8 °C) (!) 105 17 94 % 18 0633 125/88  (!) 102    
18 0523 147/90  (!) 105    
18 0337 (!) 143/99 98 °F (36.7 °C) (!) 104 18 97 % Temp (24hrs), Av.1 °F (36.7 °C), Min:97.3 °F (36.3 °C), Max:99 °F (37.2 °C) Labs:  
  
 
Recent Labs 18 
0040 HCT 30.4* HGB 9.4* Lab Results Component Value Date/Time  Sodium 147 (H) 2018 12:40 AM  
 Potassium 3.7 2018 12:40 AM  
 Chloride 112 (H) 12/30/2018 12:40 AM  
 CO2 26 12/30/2018 12:40 AM  
 Glucose 131 (H) 12/30/2018 12:40 AM  
 BUN 27 (H) 12/30/2018 12:40 AM  
 Creatinine 2.07 (H) 12/30/2018 12:40 AM  
 Calcium 8.4 (L) 12/30/2018 12:40 AM  
 
 
PT/OT:  
 
  
  
  
 
Patient mobility ASSESSMENT / PLAN:  
Principal Problem: 
  Closed displaced fracture of left femoral neck ([address] (12/27/2018) Active Problems: 
  Hip fracture ([address] (12/27/2018) Thrombocytopenia ([address] (12/27/2018) Anxiety and depression (12/27/2018) BPH (benign prostatic hyperplasia) (12/27/2018) GERD (gastroesophageal reflux disease) (12/27/2018) HTN (hypertension) (12/27/2018) Seizure ([address] (12/27/2018) UTI (urinary tract infection) (12/27/2018) Hypernatremia (12/27/2018) Acute on chronic renal failure ([address] (12/27/2018) Fall (12/27/2018) Dementia (12/27/2018) T1 vertebral fracture ([address] (12/27/2018) Pain Control: Adequate with oral agents and PRN IV narcotics DVT Prophylaxis: Heparin 5000 units Q 8, SCDs Therapy/ Weight bearing: WBAT LLE: keep abduction pillow in place while in bed Wound/ incisional issues: CDI/ No drainage Medical concerns: BLE DVT: IVC filter/ heparin 5000 units Hypernatremia: trending down at 127 CKI: stable Disposition: Rehab when medically stable. Signed By: 
Debbie Rice NP Orthopedic Trauma Nurse Practitioner [facility]

## 2018-12-30 NOTE — PROGRESS NOTES
Problem: Self Care Deficits Care Plan (Adult) Goal: *Acute Goals and Plan of Care (Insert Text) Occupational Therapy Goals Initiated 12/30/2018 1. Patient will consistently perform self-feeding with supervision/set-up within 7 day(s). 2.  Patient will perform  3 grooming tasks with supervision/set-up within 7 day(s). 3.  Patient will perform upper body dressing with minimal assistance/contact guard assist within 7 day(s). 4.  Patient will perform toilet transfers with maximal assistance within 7 day(s). Occupational Therapy EVALUATION Patient: Elvia Breaux (10 y.o. male) Date: 12/30/2018 Primary Diagnosis: Hip fracture ([facility] [address] Hip fracture ([facility] [address] Procedure(s) (LRB): HIP HEMIARTHROPLASTY POSTERIOR LEFT (Left) 1 Day Post-Op Precautions: Total hip, Other (comment)(posterior approach), ABD wedge in bed,) ASSESSMENT : 
 
Patient presented to ED from [facility] after falling and hitting his L hip and head. Patient moved to [facility] on 12/19/18. He has a history of falls and was recently at [facility] because of a fall, fatigue, and gait disturbance. He is  and his wife states that his cognition is at his baseline level (per chart review). He had great difficulty following commands (even when provided a demonstration) during evaluation today. Unsure of baseline status for ADLs. Today he performed bed mobility Max x2, and stood with Mod A x2. He is currently dependent for all transfers, LB dressing and toileting. He ate today with Mod A. He is limited by hip pain, impaired cognition, decreased balance, and decreased overall strength. He was unable to take any steps today and eventually chair had to be placed behind patient when he was standing. Patient was left up in chair and RN was informed patient was eating. Also informed RN that he may require assistance to eat meals. Chair alarm on. About 30 min after PT/OT worked with patient, code blue was called. Patient will benefit from skilled intervention to address the above impairments. Patients rehabilitation potential is considered to be Fair Factors which may influence rehabilitation potential include:  
[]             None noted [x]             Mental ability/status []             Medical condition []             Home/family situation and support systems []             Safety awareness []             Pain tolerance/management 
[]             Other: PLAN : 
Recommendations and Planned Interventions: 
[x]               Self Care Training                  [x]        Therapeutic Activities [x]               Functional Mobility Training    []        Cognitive Retraining 
[x]               Therapeutic Exercises           [x]        Endurance Activities [x]               Balance Training                   []        Neuromuscular Re-Education []               Visual/Perceptual Training     [x]   Home Safety Training 
[x]               Patient Education                 [x]        Family Training/Education []               Other (comment): Frequency/Duration: Patient will be followed by occupational therapy 5 times a week to address goals. Discharge Recommendations: Robert Kern Further Equipment Recommendations for Discharge: SELVIN SUBJECTIVE:  
Patient stated \"What? OBJECTIVE DATA SUMMARY:  
HISTORY:  
Past Medical History:  
Diagnosis Date  Anxiety  BPH (benign prostatic hyperplasia)  Depression  GERD (gastroesophageal reflux disease)  Hypertension  Seizures ([address] Past Surgical History:  
Procedure Laterality Date  HX COLONOSCOPY    
 HX HEENT    
 nasal septum  CO EGD DELIVER THERMAL ENERGY SPHNCTR/CARDIA GERD Prior Level of Function/Environment/Context: Unsure of PLOF and no family present. Patient had been at [facility] recently but unsure of how long he has been there. Occupations in which the patient is/was successful, what are the barriers preventing that success:  
Performance Patterns (routines, roles, habits, and rituals):  
Personal Interests and/or values:  
Expanded or extensive additional review of patient history:  
 
Home Situation Home Environment: Skilled nursing facility One/Two Story Residence: One story Living Alone: No 
Support Systems: Skilled nursing facility Patient Expects to be Discharged to[de-identified] Skilled nursing facility Current DME Used/Available at Home: None Hand dominance: rig ht EXAMINATION OF PERFORMANCE DEFICITS: 
Cognitive/Behavioral Status: 
Neurologic State: Alert;Eyes open to stimulus Orientation Level: Oriented to person Perseveration: No perseveration noted Safety/Judgement: Decreased awareness of environment;Decreased awareness of need for assistance;Decreased awareness of need for safety; Lack of insight into deficits Skin:  IV lines Hearing: Auditory Auditory Impairment: Hard of hearing, bilateral 
Vision/Perceptual:   
    
    
   Wears glasses, did not report recent changes in vision, limited eye contact with therapist, he mostly looks down and straight ahead when spoken to Range of Motion: 
 
AROM: Generally decreased, functional 
PROM: Generally decreased, functional 
  
  
 Poor command following during AROM testing Strength: 
 
Strength: Generally decreased, functional 
  
Coordination: 
Coordination: Generally decreased, functional 
Fine Motor Skills-Upper: Left Impaired;Right Impaired Tone & Sensation: 
 
Tone: Normal 
  
Balance: 
Sitting: Intact Standing: Intact; With support Functional Mobility and Transfers for ADLs:Bed Mobility: 
Supine to Sit: Maximum assistance;Assist x2; Additional time Transfers: 
Sit to Stand: Moderate assistance;Assist x2; Additional time Stand to Sit: Moderate assistance; Additional time;Assist x2 Bed to Chair: Total assistance Toilet Transfer : Total assistance Tub Transfer: Total assistance Shower Transfer: Total assistance ADL Assessment: 
Feeding: Minimum assistance(A to cut foot, Min A to bring food to mouth) Oral Facial Hygiene/Grooming: Moderate assistance(A to shave & brush teeth) Bathing: Maximum assistance Upper Body Dressing: Maximum assistance Lower Body Dressing: Total assistance Toileting: Total assistance ADL Intervention and task modifications: 
  
Eating: Patient needed A to initiate eating task. Also needed A to cut up food and stab-food with fork at times. Needed vc for continuation of task and encouragement to feed himself as he would open his mouth vs trying to self-feed. Cognitive Retraining Safety/Judgement: Decreased awareness of environment;Decreased awareness of need for assistance;Decreased awareness of need for safety; Lack of insight into deficits Functional Measure: 
Barthel Index: 
 
Bathin Bladder: 0 Bowels: 0 Groomin Dressin Feedin Mobility: 0 Stairs: 0 Toilet Use: 0 Transfer (Bed to Chair and Back): 5 Total: 10 Barthel and G-code impairment scale: 
Percentage of impairment CH 
0% CI 
1-19% CJ 
20-39% CK 
40-59% CL 
60-79% CM 
80-99% CN 
100% Barthel Score 0-100 100 99-80 79-60 59-40 20-39 1-19 
 0 Barthel Score 0-20 20 17-19 13-16 9-12 5-8 1-4 0 The Barthel ADL Index: Guidelines 1. The index should be used as a record of what a patient does, not as a record of what a patient could do. 2. The main aim is to establish degree of independence from any help, physical or verbal, however minor and for whatever reason. 3. The need for supervision renders the patient not independent. 4. A patient's performance should be established using the best available evidence. Asking the patient, friends/relatives and nurses are the usual sources, but direct observation and common sense are also important. However direct testing is not needed. 5. Usually the patient's performance over the preceding 24-48 hours is important, but occasionally longer periods will be relevant. 6. Middle categories imply that the patient supplies over 50 per cent of the effort. 7. Use of aids to be independent is allowed. Kalvin Mad., Barthel, D.W. (9936). Functional evaluation: the Barthel Index. [facility] (14)2. PEDRO Henderson, Kvng Guerra., Patel Yeboah., [place], [address] [place] [address] (1999). Measuring the change indisability after inpatient rehabilitation; comparison of the responsiveness of the Barthel Index and Functional [place] Measure. Journal of Neurology, Neurosurgery, and Psychiatry, 66(4), 350-368. HERIBERTO French, CAROLE Gorman, & Erinn Fan MMATT. (2004.) Assessment of post-stroke quality of life in cost-effectiveness studies: The usefulness of the Barthel Index and the EuroQoL-5D. [facility], 13, 666-92 G codes: In compliance with CMSs Claims Based Outcome Reporting, the following G-code set was chosen for this patient based on their primary functional limitation being treated: The outcome measure chosen to determine the severity of the functional limitation was the Barthel with a score of 10/100 which was correlated with the impairment scale. ? Self Care:  
  - CURRENT STATUS: CM - 80%-99% impaired, limited or restricted  - GOAL STATUS: CL - 60%-79% impaired, limited or restricted  - D/C STATUS:  ---------------To be determined--------------- Occupational Therapy Evaluation Charge Determination History Examination Decision-Making MEDIUM Complexity : Expanded review of history including physical, cognitive and psychosocial  history  HIGH Complexity : 5 or more performance deficits relating to physical, cognitive , or psychosocial skils that result in activity limitations and / or participation restrictions HIGH Complexity : Patient presents with comorbidities that affect occupational performance. Signifigant modification of tasks or assistance (eg, physical or verbal) with assessment (s) is necessary to enable patient to complete evaluation Based on the above components, the patient evaluation is determined to be of the following complexity level: HIGH Pain: 
Pain Scale 1: Adult Nonverbal Pain Scale Pain Intensity 1: 0 Activity Tolerance:  
Poor Please refer to the flowsheet for vital signs taken during this treatment. After treatment:  
[x] Patient left in no apparent distress sitting up in chair & self-feeding 
[] Patient left in no apparent distress in bed 
[x] Call bell left within reach [x] Nursing notified and therapist let her know patient was eating 
[] Caregiver present [x] chair alarm activated COMMUNICATION/EDUCATION:  
The patients plan of care was discussed with: Physical Therapist and Registered Nurse. [x] Home safety education was provided and the patient/caregiver indicated understanding. [x] Patient/family have participated as able in goal setting and plan of care. [x] Patient/family agree to work toward stated goals and plan of care. [] Patient understands intent and goals of therapy, but is neutral about his/her participation. [] Patient is unable to participate in goal setting and plan of care. This patients plan of care is appropriate for delegation to [facility].  
 
Thank you for this referral. 
Yeny Avendaño, OT

## 2018-12-30 NOTE — PROGRESS NOTES
Problem: Falls - Risk of 
Goal: *Absence of Falls Document Phil Leonard Fall Risk and appropriate interventions in the flowsheet. Outcome: Progressing Towards Goal 
Fall Risk Interventions: 
Mobility Interventions: Patient to call before getting OOB Mentation Interventions: Door open when patient unattended, Room close to nurse's station, Family/sitter at bedside Medication Interventions: Bed/chair exit alarm Elimination Interventions: Bed/chair exit alarm, Patient to call for help with toileting needs History of Falls Interventions: Door open when patient unattended, Room close to nurse's station Close to nurses station; door open and frequent rounding. At this time family member at bedside.

## 2018-12-30 NOTE — PROGRESS NOTES
Primary Nurse Virginia Salguero RN and Mari Alas RN performed a dual skin assessment on this patient Impairment noted- see wound doc flow sheet Bean score is 14

## 2018-12-30 NOTE — PROGRESS NOTES
Daily Progress Note: 12/30/2018 Zenia Caro Comings, DO Assessment/Plan: 1. Closed displaced fracture of left femoral neck (HCC) (12/27/2018)/ Acute mild T1 compression fracture. s/p posterior L hip hemiarthroplasty 12/29 
- pain management 
-PT/OT- to help with dispo planning 2. Regarding medical clearance. - cleared by cardiology 3. Acute on chronic renal failure ([facility] [address] (12/27/2018). Cr improving 
-  Continue IVF 
- monitor. 4.  Hypernatremia (12/27/2018). Improved with D5 
 - montior, cont D5 
  
5. UTI (urinary tract infection) (12/27/2018)(POA). -  ceftriaxone - Urine cx: klebsiella 
  
6. Thrombocytopenia ([facility] [address] (12/27/2018), improved - Monitor.  
  
7. Anxiety and depression (12/27/2018). - Continue home meds  
  
8. BPH (benign prostatic hyperplasia) (12/27/2018). - Continue home meds  
  
9. GERD (gastroesophageal reflux disease) (12/27/2018). - On PPI  
  
10. HTN (hypertension) (12/27/2018). - Continue metoprolol and imdur  
  
11. Seizure ([facility] [address] (12/27/2018). - On vimpat and valproic acid. - Seizure precaution. Check valproic acid.  
   
12. Fall (12/27/2018). Fall precaution  
  
13. Dementia (12/27/2018). - Continue aricept and namenda. - Supportive care  
  
14. Protein- calorie malnutrition.  
- Nutrition consult.  
  
15. Hypertroponinemia [not clinically relevant], has stabilized. Not MI, likely heart strain. 
- Has been cleared by Cardiology 16. DVT BLE 
- IVC filter placed 12/28 Problem List: 
Problem List as of 12/30/2018 Date Reviewed: 12/27/2018 Codes Class Noted - Resolved Hip fracture ([facility] [address] ICD-10-CM: L73.674H ICD-9-CM: 820.8  12/27/2018 - Present Thrombocytopenia ([facility] [address] ICD-10-CM: D69.6 ICD-9-CM: 287.5  12/27/2018 - Present Anxiety and depression ICD-10-CM: F41.9, F32.9 ICD-9-CM: 300.00, 311  12/27/2018 - Present BPH (benign prostatic hyperplasia) ICD-10-CM: N40.0 ICD-9-CM: 600.00  12/27/2018 - Present GERD (gastroesophageal reflux disease) ICD-10-CM: K21.9 ICD-9-CM: 530.81  12/27/2018 - Present HTN (hypertension) ICD-10-CM: I10 
ICD-9-CM: 401.9  12/27/2018 - Present Seizure ([facility] ICD-10-CM: R56.9 ICD-9-CM: 780.39  12/27/2018 - Present UTI (urinary tract infection) ICD-10-CM: N39.0 ICD-9-CM: 599.0  12/27/2018 - Present Hypernatremia ICD-10-CM: E87.0 ICD-9-CM: 276.0  12/27/2018 - Present Acute on chronic renal failure (HCC) ICD-10-CM: N17.9, N18.9 ICD-9-CM: 584.9, 585.9  12/27/2018 - Present * (Principal) Closed displaced fracture of left femoral neck ([facility] ICD-10-CM: D01.011C ICD-9-CM: 820.8  12/27/2018 - Present Fall ICD-10-CM: W19. Lacinda Sly ICD-9-CM: E888.9  12/27/2018 - Present Dementia ICD-10-CM: F03.90 ICD-9-CM: 294.20  12/27/2018 - Present T1 vertebral fracture ([facility] ICD-10-CM: O39.149Q 
ICD-9-CM: 805.2  12/27/2018 - Present Trimalleolar fracture of right ankle ICD-10-CM: W96.976J ICD-9-CM: 824.6  10/24/2012 - Present HPI:  
Mr. Evan Azar is a 68 y.o.  male who is admitted with femoral fracture. Mr. Evan Azar with PMH of anxiety, depression, GERD, SZD, fall presented to ER after a fall yesterday evening. Pt is in a SNF rehab center since last week, where he was admitted due to fall. Has been falling frequently recently. Yesterday evening, he fell and hit his head and LT hip. This morning, he was found by nursing staff. Pt has dementia and unable to provide Hx.  
 
12/28: Appears comfortable. Confused this am but does point to the left leg when asked about pain. Has been cleared by cardiology for surgery. To have IVC filter placed before surgery. 12/29: IVC filter placed yesterday by [facility] for L hip ORIF today at 2pm.  Pt feeling ok. No current complaints. NPO this AM. 
 
12/30: post-op day 1. Sodium is improved. Feeling better.   Pleasantly confused. Sitting up eating breakfast. Still on voiding trial after santos was removed. Review of Systems:  
Review of systems not obtained due to patient factors. Objective:  
Physical Exam:  
 
Visit Vitals /74 (BP 1 Location: Left arm, BP Patient Position: At rest) Pulse (!) 105 Temp 98.2 °F (36.8 °C) Resp 17 Ht 6' (1.829 m) Wt 76.4 kg (168 lb 8 oz) SpO2 94% BMI 22.85 kg/m² O2 Flow Rate (L/min): 2 l/min O2 Device: Nasal cannula Temp (24hrs), Av.1 °F (36.7 °C), Min:97.3 °F (36.3 °C), Max:99 °F (37.2 °C) No intake/output data recorded.  1901 -  0700 In: 3243.3 [P.O.:820; I.V.:2423.3] Out: 3300 [Urine:3000] General:  Alert, thin frail elderly male in NAD Head:  Normocephalic, without obvious abnormality, atraumatic. Eyes:  Conjunctivae/corneas clear. PERRL, EOMs intact. Nose: Nares normal. Septum midline. Throat: Lips, mucosa, and tongue dry. Teeth and gums normal.  
Neck: Dressing over R neck C/D. Supple, symmetrical, trachea midline, no adenopathy, thyroid: no enlargement/tenderness/nodules, no carotid bruit and no JVD. Back:   Symmetric, no curvature. ROM normal. No CVA tenderness. Lungs:   Clear to auscultation bilaterally. Chest wall:  No tenderness or deformity. Heart:  Regular rate and rhythm, S1, S2 normal, no murmur, click, rub or gallop. Abdomen:   Soft, non-tender. Bowel sounds normal. No masses,  No organomegaly. Extremities: LLE with clean/dry dressing. No calf tenderness or cords. Pulses: 2+ and symmetric all extremities. Skin: Skin color, texture, turgor normal. No rashes or lesions Neurologic: CNII-XII intact. Alert and oriented X 1. Fine motor of hands and fingers normal.   equal.   Sensation grossly normal to touch. Data Review:  
   
Recent Days: 
Recent Labs 18 
0040 18 
0718 18 
0400 WBC 7.2 6.7 8.6 HGB 9.4* 10.0* 11.0*  
HCT 30.4* 32.1* 35.3*  
 155 134* Recent Labs 12/30/18 
0040 12/29/18 
1758 12/29/18 
1304 12/29/18 
0718  12/27/18 
1120 * 150* 151* 154*   < > 150*  
K 3.7 4.4 4.3 4.0   < > 3.9 * 116* 115* 117*   < > 113* CO2 26 28 26 29   < > 27 * 100 124* 114*   < > 124* BUN 27* 29* 29* 27*   < > 31* CREA 2.07* 2.11* 2.10* 2.17*   < > 2.64* CA 8.4* 8.9 8.7 8.7   < > 8.9 MG  --   --   --   --   --  1.8 PHOS 3.0  --   --   --   --   --   
ALB 1.9*  --   --  2.0*  --  2.3* TBILI  --   --   --  0.2  --  0.2 SGOT  --   --   --  15  --  17 ALT  --   --   --  14  --  16  
 < > = values in this interval not displayed. No results for input(s): PH, PCO2, PO2, HCO3, FIO2 in the last 72 hours. 24 Hour Results: 
Recent Results (from the past 24 hour(s)) METABOLIC PANEL, BASIC Collection Time: 12/29/18  1:04 PM  
Result Value Ref Range Sodium 151 (H) 136 - 145 mmol/L Potassium 4.3 3.5 - 5.1 mmol/L Chloride 115 (H) 97 - 108 mmol/L  
 CO2 26 21 - 32 mmol/L Anion gap 10 5 - 15 mmol/L Glucose 124 (H) 65 - 100 mg/dL BUN 29 (H) 6 - 20 MG/DL Creatinine 2.10 (H) 0.70 - 1.30 MG/DL  
 BUN/Creatinine ratio 14 12 - 20 GFR est AA 38 (L) >60 ml/min/1.73m2 GFR est non-AA 31 (L) >60 ml/min/1.73m2 Calcium 8.7 8.5 - 04.3 MG/DL  
METABOLIC PANEL, BASIC Collection Time: 12/29/18  5:58 PM  
Result Value Ref Range Sodium 150 (H) 136 - 145 mmol/L Potassium 4.4 3.5 - 5.1 mmol/L Chloride 116 (H) 97 - 108 mmol/L  
 CO2 28 21 - 32 mmol/L Anion gap 6 5 - 15 mmol/L Glucose 100 65 - 100 mg/dL BUN 29 (H) 6 - 20 MG/DL Creatinine 2.11 (H) 0.70 - 1.30 MG/DL  
 BUN/Creatinine ratio 14 12 - 20 GFR est AA 38 (L) >60 ml/min/1.73m2 GFR est non-AA 31 (L) >60 ml/min/1.73m2 Calcium 8.9 8.5 - 10.1 MG/DL RENAL FUNCTION PANEL Collection Time: 12/30/18 12:40 AM  
Result Value Ref Range Sodium 147 (H) 136 - 145 mmol/L Potassium 3.7 3.5 - 5.1 mmol/L  Chloride 112 (H) 97 - 108 mmol/L  
 CO2 26 21 - 32 mmol/L Anion gap 9 5 - 15 mmol/L Glucose 131 (H) 65 - 100 mg/dL BUN 27 (H) 6 - 20 MG/DL Creatinine 2.07 (H) 0.70 - 1.30 MG/DL  
 BUN/Creatinine ratio 13 12 - 20 GFR est AA 38 (L) >60 ml/min/1.73m2 GFR est non-AA 32 (L) >60 ml/min/1.73m2 Calcium 8.4 (L) 8.5 - 10.1 MG/DL Phosphorus 3.0 2.6 - 4.7 MG/DL Albumin 1.9 (L) 3.5 - 5.0 g/dL CBC WITH AUTOMATED DIFF Collection Time: 12/30/18 12:40 AM  
Result Value Ref Range WBC 7.2 4.1 - 11.1 K/uL  
 RBC 3.28 (L) 4.10 - 5.70 M/uL HGB 9.4 (L) 12.1 - 17.0 g/dL HCT 30.4 (L) 36.6 - 50.3 % MCV 92.7 80.0 - 99.0 FL  
 MCH 28.7 26.0 - 34.0 PG  
 MCHC 30.9 30.0 - 36.5 g/dL  
 RDW 14.5 11.5 - 14.5 % PLATELET 343 242 - 370 K/uL MPV 11.7 8.9 - 12.9 FL  
 NRBC 0.0 0  WBC ABSOLUTE NRBC 0.00 0.00 - 0.01 K/uL NEUTROPHILS 66 32 - 75 % LYMPHOCYTES 12 12 - 49 % MONOCYTES 12 5 - 13 % EOSINOPHILS 9 (H) 0 - 7 % BASOPHILS 0 0 - 1 % IMMATURE GRANULOCYTES 1 (H) 0.0 - 0.5 % ABS. NEUTROPHILS 4.7 1.8 - 8.0 K/UL  
 ABS. LYMPHOCYTES 0.8 0.8 - 3.5 K/UL  
 ABS. MONOCYTES 0.9 0.0 - 1.0 K/UL  
 ABS. EOSINOPHILS 0.7 (H) 0.0 - 0.4 K/UL  
 ABS. BASOPHILS 0.0 0.0 - 0.1 K/UL  
 ABS. IMM. GRANS. 0.1 (H) 0.00 - 0.04 K/UL  
 DF AUTOMATED Medications reviewed Current Facility-Administered Medications Medication Dose Route Frequency  dextrose 5% infusion  100 mL/hr IntraVENous CONTINUOUS  
 sodium chloride (NS) flush 5-10 mL  5-10 mL IntraVENous Q8H  
 sodium chloride (NS) flush 5-10 mL  5-10 mL IntraVENous PRN  
 acetaminophen (TYLENOL) tablet 650 mg  650 mg Oral Q6H  
 naloxone (NARCAN) injection 0.4 mg  0.4 mg IntraVENous PRN  
 calcium-vitamin D (OS-NITA) 500 mg-200 unit tablet  1 Tab Oral TID WITH MEALS  senna-docusate (PERICOLACE) 8.6-50 mg per tablet 1 Tab  1 Tab Oral BID  polyethylene glycol (MIRALAX) packet 17 g  17 g Oral DAILY  [START ON 12/31/2018] bisacodyl (DULCOLAX) suppository 10 mg  10 mg Rectal DAILY PRN  
 heparin (porcine) injection 5,000 Units  5,000 Units SubCUTAneous Q8H  
 morphine injection 2 mg  2 mg IntraVENous Q4H PRN  
 sodium chloride (NS) flush 5-10 mL  5-10 mL IntraVENous Q8H  
 sodium chloride (NS) flush 5-10 mL  5-10 mL IntraVENous PRN  
 cefTRIAXone (ROCEPHIN) 1 g in 0.9% sodium chloride (MBP/ADV) 50 mL  1 g IntraVENous Q24H  
 HYDROcodone-acetaminophen (NORCO) 5-325 mg per tablet 1 Tab  1 Tab Oral Q6H PRN  
 acetaminophen (TYLENOL) tablet 650 mg  650 mg Oral Q6H PRN  
 albuterol (PROVENTIL VENTOLIN) nebulizer solution 2.5 mg  2.5 mg Nebulization Q4H PRN  
 atorvastatin (LIPITOR) tablet 40 mg  40 mg Oral DAILY  donepezil (ARICEPT) tablet 10 mg  10 mg Oral QHS  pantoprazole (PROTONIX) tablet 40 mg  40 mg Oral DAILY  isosorbide mononitrate ER (IMDUR) tablet 30 mg  30 mg Oral DAILY  lacosamide (VIMPAT) tablet 100 mg  100 mg Oral BID  memantine (NAMENDA) tablet 10 mg  10 mg Oral QHS  metoprolol tartrate (LOPRESSOR) tablet 12.5 mg  12.5 mg Oral BID  
 oxybutynin chloride XL (DITROPAN XL) tablet 10 mg  10 mg Oral DAILY  valproic acid (DEPAKENE) capsule 500 mg  500 mg Oral Q12H Care Plan discussed with: Patient/Family and Nurse Total time spent with patient and review of records: 30 minutes.  
 
Stephanie Iverson MD

## 2018-12-30 NOTE — PROGRESS NOTES
2121 Dylan Rose responded to code blue call. Upon arrival, pt was responsive and had a pulse. Per bedside RN, it is unknown whether or not the patient lost his pulse at any time. The patient was up in the chair for an estimated 30 minutes. When the PCT came into the room the patient appeared to be passed out on his lunch tray. They then returned the patient to his bed, where he was when I arrived to the room. Pt has a history of a seizure d/o and was given valproic acid this AM by per RN. Dr. Maninder Roman at bedside orders received for labs, CT head, and telemetry. Pt taken to CT and dropped off at tele room 329 at 1545. Robert Vicente RN at bedside.

## 2018-12-30 NOTE — PROGRESS NOTES
Responded to Code blue, changed to RRT as pt has pulse. He was found slumped in his chair. Earlier today conversing appropriately. No witnesses to event. He is now confused, difficulty providing history. Appears to be post ictal 
 
Visit Vitals /68 (BP 1 Location: Left arm, BP Patient Position: At rest) Pulse 96 Temp 98.1 °F (36.7 °C) Resp 16 Ht 6' (1.829 m) Wt 76.4 kg (168 lb 8 oz) SpO2 98% BMI 22.85 kg/m² Physical Exam: 
 
Gen: Elderly, confused HEENT:  Pink conjunctivae, pinpoint pupils, hearing intact to voice, moist mucous membranes Neck: Supple, without masses, thyroid non-tender Resp: No accessory muscle use, clear breath sounds without wheezes rales or rhonchi 
Card: No murmurs, normal S1, S2 without thrills, bruits or peripheral edema Abd:  Soft, non-tender, non-distended, normoactive bowel sounds are present, no palpable organomegaly and no detectable hernias Lymph:  No cervical or inguinal adenopathy Musc: No cyanosis or clubbing Skin: No rashes or ulcers, skin turgor is good Neuro: Moves all extremities. Unable to follow commands Psych:  Poor insight, not oriented to person, place and time, alert EKG NSR no ST Twave changes Suspect pt may have had seizure event in setting of h/o seizure disorder. Will also r/o MI given recent mildly elevated TnI 
-order CT head 
-stat hg, BMP (noted hypernatremia) 
-pt with h/o CAD and mildly elevated TnI will send repeat TnI 
-EEG 
-send valproic level 
-give 1L bolus 45 minutes critical care provided Signed out to Dr. Miguel Feliz who will resume care of pt

## 2018-12-31 LAB
ALBUMIN SERPL-MCNC: 1.6 G/DL (ref 3.5–5)
ANION GAP SERPL CALC-SCNC: 9 MMOL/L (ref 5–15)
BASOPHILS # BLD: 0 K/UL (ref 0–0.1)
BASOPHILS NFR BLD: 0 % (ref 0–1)
BUN SERPL-MCNC: 27 MG/DL (ref 6–20)
BUN/CREAT SERPL: 13 (ref 12–20)
CALCIUM SERPL-MCNC: 8.6 MG/DL (ref 8.5–10.1)
CHLORIDE SERPL-SCNC: 111 MMOL/L (ref 97–108)
CO2 SERPL-SCNC: 26 MMOL/L (ref 21–32)
CREAT SERPL-MCNC: 2.08 MG/DL (ref 0.7–1.3)
DIFFERENTIAL METHOD BLD: ABNORMAL
EOSINOPHIL # BLD: 0 K/UL (ref 0–0.4)
EOSINOPHIL NFR BLD: 0 % (ref 0–7)
ERYTHROCYTE [DISTWIDTH] IN BLOOD BY AUTOMATED COUNT: 14.3 % (ref 11.5–14.5)
ERYTHROCYTE [DISTWIDTH] IN BLOOD BY AUTOMATED COUNT: 14.6 % (ref 11.5–14.5)
GLUCOSE BLD STRIP.AUTO-MCNC: 106 MG/DL (ref 65–100)
GLUCOSE BLD STRIP.AUTO-MCNC: 135 MG/DL (ref 65–100)
GLUCOSE BLD STRIP.AUTO-MCNC: 204 MG/DL (ref 65–100)
GLUCOSE BLD STRIP.AUTO-MCNC: 99 MG/DL (ref 65–100)
GLUCOSE SERPL-MCNC: 117 MG/DL (ref 65–100)
HCT VFR BLD AUTO: 25.8 % (ref 36.6–50.3)
HCT VFR BLD AUTO: 27.3 % (ref 36.6–50.3)
HGB BLD-MCNC: 8.2 G/DL (ref 12.1–17)
HGB BLD-MCNC: 8.6 G/DL (ref 12.1–17)
IMM GRANULOCYTES # BLD: 0 K/UL
IMM GRANULOCYTES NFR BLD AUTO: 0 %
LYMPHOCYTES # BLD: 0.9 K/UL (ref 0.8–3.5)
LYMPHOCYTES NFR BLD: 10 % (ref 12–49)
MCH RBC QN AUTO: 29.1 PG (ref 26–34)
MCH RBC QN AUTO: 29.2 PG (ref 26–34)
MCHC RBC AUTO-ENTMCNC: 31.5 G/DL (ref 30–36.5)
MCHC RBC AUTO-ENTMCNC: 31.8 G/DL (ref 30–36.5)
MCV RBC AUTO: 91.8 FL (ref 80–99)
MCV RBC AUTO: 92.2 FL (ref 80–99)
MONOCYTES # BLD: 0.9 K/UL (ref 0–1)
MONOCYTES NFR BLD: 10 % (ref 5–13)
MYELOCYTES NFR BLD MANUAL: 2 %
NEUTS SEG # BLD: 7.1 K/UL (ref 1.8–8)
NEUTS SEG NFR BLD: 78 % (ref 32–75)
NRBC # BLD: 0 K/UL (ref 0–0.01)
NRBC # BLD: 0 K/UL (ref 0–0.01)
NRBC BLD-RTO: 0 PER 100 WBC
NRBC BLD-RTO: 0 PER 100 WBC
PHOSPHATE SERPL-MCNC: 3.1 MG/DL (ref 2.6–4.7)
PLATELET # BLD AUTO: 166 K/UL (ref 150–400)
PLATELET # BLD AUTO: 193 K/UL (ref 150–400)
PMV BLD AUTO: 11.5 FL (ref 8.9–12.9)
PMV BLD AUTO: 11.9 FL (ref 8.9–12.9)
POTASSIUM SERPL-SCNC: 3.6 MMOL/L (ref 3.5–5.1)
RBC # BLD AUTO: 2.81 M/UL (ref 4.1–5.7)
RBC # BLD AUTO: 2.96 M/UL (ref 4.1–5.7)
RBC MORPH BLD: ABNORMAL
SERVICE CMNT-IMP: ABNORMAL
SERVICE CMNT-IMP: NORMAL
SODIUM SERPL-SCNC: 146 MMOL/L (ref 136–145)
WBC # BLD AUTO: 9.1 K/UL (ref 4.1–11.1)
WBC # BLD AUTO: 9.7 K/UL (ref 4.1–11.1)

## 2018-12-31 PROCEDURE — 94760 N-INVAS EAR/PLS OXIMETRY 1: CPT

## 2018-12-31 PROCEDURE — 97530 THERAPEUTIC ACTIVITIES: CPT

## 2018-12-31 PROCEDURE — G8997 SWALLOW GOAL STATUS: HCPCS | Performed by: SPEECH-LANGUAGE PATHOLOGIST

## 2018-12-31 PROCEDURE — 77030020186 HC BOOT HL PROTCT SAGE -B

## 2018-12-31 PROCEDURE — C9254 INJECTION, LACOSAMIDE: HCPCS | Performed by: PSYCHIATRY & NEUROLOGY

## 2018-12-31 PROCEDURE — 74011000250 HC RX REV CODE- 250: Performed by: FAMILY MEDICINE

## 2018-12-31 PROCEDURE — 74011250636 HC RX REV CODE- 250/636: Performed by: PSYCHIATRY & NEUROLOGY

## 2018-12-31 PROCEDURE — 36415 COLL VENOUS BLD VENIPUNCTURE: CPT

## 2018-12-31 PROCEDURE — 74011000258 HC RX REV CODE- 258: Performed by: FAMILY MEDICINE

## 2018-12-31 PROCEDURE — 74011250636 HC RX REV CODE- 250/636: Performed by: ORTHOPAEDIC SURGERY

## 2018-12-31 PROCEDURE — G8996 SWALLOW CURRENT STATUS: HCPCS | Performed by: SPEECH-LANGUAGE PATHOLOGIST

## 2018-12-31 PROCEDURE — C1758 CATHETER, URETERAL: HCPCS

## 2018-12-31 PROCEDURE — 74011000258 HC RX REV CODE- 258: Performed by: INTERNAL MEDICINE

## 2018-12-31 PROCEDURE — 77010033678 HC OXYGEN DAILY

## 2018-12-31 PROCEDURE — 85027 COMPLETE CBC AUTOMATED: CPT

## 2018-12-31 PROCEDURE — 74011000258 HC RX REV CODE- 258: Performed by: PSYCHIATRY & NEUROLOGY

## 2018-12-31 PROCEDURE — 82962 GLUCOSE BLOOD TEST: CPT

## 2018-12-31 PROCEDURE — 74011250636 HC RX REV CODE- 250/636: Performed by: FAMILY MEDICINE

## 2018-12-31 PROCEDURE — 74011250637 HC RX REV CODE- 250/637: Performed by: INTERNAL MEDICINE

## 2018-12-31 PROCEDURE — 85025 COMPLETE CBC W/AUTO DIFF WBC: CPT

## 2018-12-31 PROCEDURE — 77030034850

## 2018-12-31 PROCEDURE — 74011250637 HC RX REV CODE- 250/637: Performed by: ORTHOPAEDIC SURGERY

## 2018-12-31 PROCEDURE — 92610 EVALUATE SWALLOWING FUNCTION: CPT | Performed by: SPEECH-LANGUAGE PATHOLOGIST

## 2018-12-31 PROCEDURE — 97116 GAIT TRAINING THERAPY: CPT

## 2018-12-31 PROCEDURE — 74011250636 HC RX REV CODE- 250/636: Performed by: INTERNAL MEDICINE

## 2018-12-31 PROCEDURE — 80069 RENAL FUNCTION PANEL: CPT

## 2018-12-31 PROCEDURE — 74011250637 HC RX REV CODE- 250/637: Performed by: FAMILY MEDICINE

## 2018-12-31 PROCEDURE — 65660000000 HC RM CCU STEPDOWN

## 2018-12-31 RX ORDER — ACETAMINOPHEN 650 MG/1
650 SUPPOSITORY RECTAL
Status: DISCONTINUED | OUTPATIENT
Start: 2018-12-31 | End: 2019-01-02 | Stop reason: HOSPADM

## 2018-12-31 RX ORDER — ACETAMINOPHEN 325 MG/1
650 TABLET ORAL
Status: DISCONTINUED | OUTPATIENT
Start: 2018-12-31 | End: 2019-01-02 | Stop reason: HOSPADM

## 2018-12-31 RX ADMIN — Medication 10 ML: at 06:19

## 2018-12-31 RX ADMIN — HEPARIN SODIUM 5000 UNITS: 5000 INJECTION INTRAVENOUS; SUBCUTANEOUS at 21:26

## 2018-12-31 RX ADMIN — OYSTER SHELL CALCIUM WITH VITAMIN D 1 TABLET: 500; 200 TABLET, FILM COATED ORAL at 18:24

## 2018-12-31 RX ADMIN — CEFTRIAXONE SODIUM 1 G: 1 INJECTION, POWDER, FOR SOLUTION INTRAMUSCULAR; INTRAVENOUS at 14:16

## 2018-12-31 RX ADMIN — SODIUM CHLORIDE 150 MG: 900 INJECTION, SOLUTION INTRAVENOUS at 12:03

## 2018-12-31 RX ADMIN — DONEPEZIL HYDROCHLORIDE 10 MG: 5 TABLET, FILM COATED ORAL at 21:26

## 2018-12-31 RX ADMIN — DEXTROSE MONOHYDRATE 100 ML/HR: 5 INJECTION, SOLUTION INTRAVENOUS at 07:50

## 2018-12-31 RX ADMIN — DEXTROSE MONOHYDRATE 100 ML/HR: 5 INJECTION, SOLUTION INTRAVENOUS at 11:58

## 2018-12-31 RX ADMIN — VALPROATE SODIUM 250 MG: 100 INJECTION, SOLUTION INTRAVENOUS at 04:16

## 2018-12-31 RX ADMIN — MEMANTINE 10 MG: 10 TABLET ORAL at 21:26

## 2018-12-31 RX ADMIN — VALPROATE SODIUM 250 MG: 100 INJECTION, SOLUTION INTRAVENOUS at 21:26

## 2018-12-31 RX ADMIN — HEPARIN SODIUM 5000 UNITS: 5000 INJECTION INTRAVENOUS; SUBCUTANEOUS at 03:00

## 2018-12-31 RX ADMIN — Medication 10 ML: at 21:26

## 2018-12-31 RX ADMIN — MORPHINE SULFATE 2 MG: 4 INJECTION, SOLUTION INTRAMUSCULAR; INTRAVENOUS at 03:20

## 2018-12-31 RX ADMIN — VALPROATE SODIUM 250 MG: 100 INJECTION, SOLUTION INTRAVENOUS at 10:31

## 2018-12-31 RX ADMIN — METOPROLOL TARTRATE 12.5 MG: 25 TABLET ORAL at 18:24

## 2018-12-31 RX ADMIN — VALPROATE SODIUM 250 MG: 100 INJECTION, SOLUTION INTRAVENOUS at 17:05

## 2018-12-31 RX ADMIN — Medication 10 MG: at 10:19

## 2018-12-31 RX ADMIN — ACETAMINOPHEN 650 MG: 650 SUPPOSITORY RECTAL at 10:19

## 2018-12-31 RX ADMIN — HEPARIN SODIUM 5000 UNITS: 5000 INJECTION INTRAVENOUS; SUBCUTANEOUS at 11:51

## 2018-12-31 NOTE — PROGRESS NOTES
12/31/2018 
9:08 AM 
EMR reviewed, pt is continuing to require medical management, had syncopal type episode w/ code called 12/30, waiting on speech and neuro. Updates sent to [id number] [place] [address] in [facility]. Will follow. Bean Macias

## 2018-12-31 NOTE — PROGRESS NOTES
Speech Path Consult received and appreciated. Chart reviewed. Patient currently undergoing EEG. Will f/u once available. Of note, patient on [facility] soft/HONEY thick liquid diet at [address] [facility]. Maria Dolores Cordon MS, CCC-SLP, BCS-S

## 2018-12-31 NOTE — PROGRESS NOTES
Bedside and Verbal shift change report given to Jay Landon RN (oncoming nurse) by Gio Thomas RN (offgoing nurse). Report included the following information SBAR, Kardex, ED Summary, Procedure Summary, Intake/Output, Recent Results, Med Rec Status and Cardiac Rhythm NSR. 
 
2140: Pt NPO due to failure of swallow test. Notified  of pt status. Dr. Ricki Arredondo to hold all oral meds. Notified Pharmacy for compatible seizure med equivelant to Valproic acid. Orders to follow. Bedside and Verbal shift change report given to Johnny Solano RN (oncoming nurse) by Jay Landon RN (offgoing nurse). Report included the following information SBAR, Kardex, ED Summary, Procedure Summary, Intake/Output, Recent Results, Med Rec Status and Cardiac Rhythm NSR.

## 2018-12-31 NOTE — PROGRESS NOTES
[facility] HIP ARTHROPLASTY DAILY NOTE 
 
POD  2 Days Post-Op s/p Procedure(s): HIP HEMIARTHROPLASTY POSTERIOR LEFT 
 
 ASSESSMENT / PLAN :  
1. Admit to medicine appreciate help in care 2. Pain Control : Acceptable - Some pain at times, but the patient does not wish to increase their medications 3. Overnight Issues : none but yesterday with seizure or syncopal event requiring code to be called 4. Wound or incisional issue : Healing incision with no visible drainage 5. Therapy / Weight Bearing Recommendations : Weight bear as tolerated with use of a walker and two person assist while mobilizing , posterior hip precautions 6. BLE DVTs: IVC filter, post op sq heparin PPX dosing for 5 days then therapeutic anticoagulation 7. UTI: cont ceftriaxone 8. Gypsy: cleared by cards, stable 9. Seizure d/o: neurology to see today 10. Dementia/anxiety/depression: cont home meds and cont to monitor 11. Hypernatremia: improved, cont to monitor 12. MIGUEL on CKD: fluids and monitor 13. Disposition : [facility] with daily rehab 14. Follow up: 2 weeks with me call [phone] to schedule. WBAT, posterior hip precautions, keep dressing in place until 7-10 days post op SUBJECTIVE :  
 
GERI overnight. Pain controlled at rest moderate with manipulation. Working with PT and minimal progress. NPO after failing swallow. Seizure vs syncopal event yesterday waiting on neurology. OBJECTIVE :  
 
Vitals:  
 12/30/18 2054 12/30/18 2300 12/30/18 2331 12/31/18 1158 BP: 135/82  (!) 127/91 123/71 Pulse: 96 (!) 101 (!) 102 99 Resp: 18 20 18 Temp: 97.9 °F (36.6 °C)  98.5 °F (36.9 °C) 98.9 °F (37.2 °C) SpO2: 99%  97% 95% Weight:    79.8 kg (175 lb 14.4 oz) Height:      
 
 
rousable but not oriented to place or situation 
left exam of the hip reveals that the dressing is clean, dry and intact. The patient has + quadriceps, ankle DF/PF, EHL/FHL Sensation is intact to light touch distally No calf pain. Labs: 
Recent Labs 12/31/18 
0450 HGB 8.2* HCT 25.8* *  
K 3.6 * CO2 26 BUN 27* CREA 2.08* * Patient mobility Gait Base of Support: Center of gravity altered Ambulation - Level of Assistance: Maximum assistance, Assist x2, Additional time Distance (ft): 0 Feet (ft)(attempted several times to initiate a step, pt unable) Assistive Device: Walker, rolling, Gait belt Mario Romero. Mann Mahoney, [address] [place] [address] [phone] Medical Staff : Michael Stanton Office : [phone]

## 2018-12-31 NOTE — CONSULTS
[address] [facility]    Name:CHIRAG LAGUNAS  MR#: [id number]  : 1945  ACCOUNT #: [de-identified]   DATE OF SERVICE: 2018    CONSULTED BY:  Dr. Billie Espino:  Possible seizure. HISTORY OF PRESENT ILLNESS:  The patient is a 66-year-old gentleman with a history of dementia and seizure disorder, who was admitted to [place] [address] on  after a fall and hip fracture. He was being treated for this when yesterday, a rapid response was called when he was found to be unresponsive with his face in his mashed potatoes. It was unclear whether the patient had a seizure preceding this event. He was subsequently transferred down to the 3rd floor for further treatment and neurology was consulted. The patient does have a history of being on Vimpat 100 mg twice a day and Depakote 500 mg twice a day. Since he had the rapid response yesterday, he has been n.p.o. and has not received his seizure medication, Vimpat, but has received his valproic acid IV. He did have a level drawn for his Depakote yesterday and that was within range of 55. When I came to see the patient, he was alert. He is not oriented. It is unknown what his baseline as he does have significant dementia. He was able to name and repeat and answer my questions as well as had 2/3 word recall during our testing. The patient denies having had a seizure yesterday; however, he is not able to really provide any significant history. PAST MEDICAL HISTORY:  Anxiety, dementia, history of seizures, hypertension, depression. SOCIAL HISTORY:  The patient is , does not smoke or drink. FAMILY HISTORY:  No significant family history. ALLERGIES:  AUGMENTIN. MEDICATIONS:  Tylenol, Depacon, Vimpat, Narcan, Nataly-Colace, Dulcolax, heparin, Rocephin, Norco, Lipitor, Aricept, Namenda, Imdur, Lopressor, Ditropan.      REVIEW OF SYSTEMS:  Unobtainable secondary to patient's dementia. CLINICAL DATA REVIEW:  Imaging:  CT of the head done yesterday showed no acute process. I personally reviewed these images in the PACS system and this is my impression. EEG pending. Labs show a valproic acid level of 55. Metabolic panel with elevated glucose of 149, elevated creatinine of 2.37. CBC with a low hemoglobin of 8.2.  UA initially was positive for infection on 12/27. EKG is sinus tachycardia. PHYSICAL EXAMINATION:  VITAL SIGNS:  Blood pressure 122/81, pulse 92, temperature 98.7, respirations 20, oxygen saturation 98%. GENERAL:  The patient is alert and cooperative, no distress. HEAD:  Normocephalic without obvious abnormality. EYES:  Conjunctivae clear. Pupils equally round, reactive to light. Extraocular movements intact. Visual fields full. No papilledema. LUNGS:  Unlabored breathing. HEART:  Regular rate and rhythm. ABDOMEN:  Soft, nondistended. EXTREMITIES:  Normal, atraumatic. No cyanosis or edema. Pulses 2+ and symmetric. SKIN:  Color, texture, turgor normal.  NEUROLOGIC:  General attention normal.  Language, naming, repetition and fluency are normal.  Memory:  The patient is only oriented to self and hospital, but not the correct hospital or the date. He can do 2/3 word recall. Cranial nerves II-XII:  Visual fields full. Pupils equally round and reactive to light. Extraocular movements intact. Face is symmetric. Tongue and palate midline. Motor:  Normal bulk and tone. No tremor. Strength appears to be symmetric in all 4 extremities, but limited in lower extremity secondary to pain. Sensory intact to light touch x4. Coordination and gait:  Deferred. Reflexes:  1+ throughout. IMPRESSION:  This is a 79-year-old gentleman who presented to the emergency room after a fall and a hip fracture which has subsequently been treated. He had an episode yesterday concerning for possible seizure when he was found unresponsive.   He does have a known history of seizure disorder. He has been requiring pain medication to stay as well as being treated for urinary tract infection, both of which could lower his seizure threshold and potentially could have caused seizure. CT of the head was negative. Neuro exam appears to be nonfocal other than his underlying mental status. We will check EEG today. We will also change Vimpat to IV and increase it to 150 twice a day. RECOMMENDATIONS:  1.  CT head negative as above. 2.  EEG. 3.  Continue telemetry. 4.  Continue antibiotics for UTI. 5.  Change Vimpat IV will be 150 mg twice a day. We will change back to p.o. when able. 6.  Continue Depakene 250 q.6.  7.  We will continue Namenda and Aricept when able. 8.  Seizure precautions. 9.  Limit pain medications. 10. VTE prophylaxis with heparin. Thank you very much for this consultation. We will follow this patient with you and give further recommendations as indicated.       MD JACKSON Gupta / MARGY  D: 12/31/2018 11:21     T: 12/31/2018 11:44  JOB #: [id number]

## 2018-12-31 NOTE — PROGRESS NOTES
Problem: Dysphagia (Adult) Goal: *Acute Goals and Plan of Care (Insert Text) Speech Pathology Initiated 12/31/18 1. Patient will participate in [facility] tomorrow, 1/1, with goals and recommendations to follow Speech LAnguage Pathology bedside swallow evaluation Patient: Beverley Rubio (55 y.o. male) Date: 12/31/2018 Primary Diagnosis: Hip fracture ([address] Hip fracture ([address] Procedure(s) (LRB): HIP HEMIARTHROPLASTY POSTERIOR LEFT (Left) 2 Days Post-Op Precautions: Total hip, Other (comment)(posterior approach), ABD wedge in bed,) ASSESSMENT : 
Based on the objective data described below, the patient presents with no oral and suspected at least moderate pharyngeal dysphagia characterized by suspected delayed initiation and reduced hyolaryngeal excursion. Patient with consistent, delayed coughing with both teaspoon and cup sip trials of water. No overt s/s of aspiration with HONEY thickened water or bites of applesauce. Although alert, mentation is still altered and may be contributing to pharyngeal dysphagia also. At this time, given patient's h/o dysphagia and bedside findings, recommend proceeding with MBS to further objectively assess swallow function and determine safest diet. Will tentatively plan to complete this tomorrow (as able given holiday). Will proceed with mechanical soft/HONEY thick liquid diet for now. Pills OK one at a time w/ thickened liquid. Patient will benefit from skilled intervention to address the above impairments. Patients rehabilitation potential is considered to be Good Factors which may influence rehabilitation potential include:  
[]            None noted [x]            Mental ability/status [x]            Medical condition []            Home/family situation and support systems 
[]            Safety awareness 
[]            Pain tolerance/management []            Other: PLAN : 
Recommendations and Planned Interventions: 1. Mechanical soft/HONEY thick liquids 2. Safe swallowing strategies (upright for all PO, small bites/sips, slow rate) 3. MBS tomorrow, 1/1, for further objective assessment of swallow Frequency/Duration: Patient will be followed by speech-language pathology 4 times a week to address goals. (TBD pending MBS results) Discharge Recommendations: [facility] and To Be Determined SUBJECTIVE:  
Patient awake, alert with several family members bedside. Wife provides much of the history. Patient has been c/o difficulty swallowing with poor PO intake several weeks prior to admission. Apparently he has a swallow study (uncertain if MBS or BAS) ~1 year ago at [address] [place] [address] at which time the patient was told he needed to be on thickened liquids. He was non-compliant of this recommendation at home but wife denies any respiratory illnesses over the past year. He did however, frequently cough with liquids. Recently, he was admitted to [address] [place] [address] and placed on a mechanical soft diet/HONEY thick liquid-- wife denies any repeat swallow testing at that time. He was transferred to [address] [place] [address] for rehab with a scheduled repeat swallow study (?MBS v. BAS) on Thursday, 11/27 however, he was admitted to [facility] for hip pain instead. CXR noted a large HH. H/o GERD also. Family gave patient maria crackers, applesauce, and sips of water prior to SLP arrival given unknown NPO status. All report he tolerated well without difficulty. OBJECTIVE:  
 
Past Medical History:  
Diagnosis Date  Anxiety  BPH (benign prostatic hyperplasia)  Depression  GERD (gastroesophageal reflux disease)  Hypertension  Seizures [address] [address] [facility] Past Surgical History:  
Procedure Laterality Date  HX COLONOSCOPY    
 HX HEENT    
 nasal septum  [place] EGD DELIVER THERMAL ENERGY SPHNCTR/CARDIA GERD Prior Level of Function/Home Situation:  
Home Situation Home Environment: Skilled nursing facility One/Two Story Residence: One story Living Alone: No 
Support Systems: Skilled nursing facility Patient Expects to be Discharged to[de-identified] Skilled nursing facility Current DME Used/Available at Home: None Diet prior to admission: Regular/thin liquids at home[facility] to [facility] Current Diet:  NPOCognitive and Communication Status: 
Neurologic State: Alert, Confused Orientation Level: Oriented to person Cognition: Follows commands, Impaired decision making, Poor safety awareness Perception: Appears intact Perseveration: No perseveration noted Safety/Judgement: Decreased awareness of need for safety, Decreased awareness of environment, Decreased awareness of need for assistance, Decreased insight into deficits Oral Assessment: 
Oral Assessment Labial: No impairment Dentition: Intact Oral Hygiene: dry oral mucosa Lingual: No impairment Velum: No impairment P.O. Trials: 
Patient Position: Upright in bed Vocal quality prior to P.O.: Low volume Consistency Presented: Puree; Honey thick liquid; Thin liquid How Presented: SLP-fed/presented;Self-fed/presented;Spoon;Cup/sip Bolus Acceptance: No impairment Bolus Formation/Control: No impairment Propulsion: No impairment Oral Residue: None Initiation of Swallow: Delayed (# of seconds)(suspected) Laryngeal Elevation: Decreased Aspiration Signs/Symptoms: Delayed cough/throat clear(with trials of thin liquid) Pharyngeal Phase Characteristics: Suspected pharyngeal residue; Other (comment)(suspected delayed swallow, reduced excursion) Effective Modifications: Small sips and bites Cues for Modifications: None Oral Phase Severity: No impairment Pharyngeal Phase Severity : Moderate NOMS:  
The NOMS functional outcome measure was used to quantify this patient's level of swallowing impairment. Based on the NOMS, the patient was determined to be at level 3 for swallow function G Codes: In compliance with CMSs Claims Based Outcome Reporting, the following G-code set was chosen for this patient based the use of the NOMS functional outcome to quantify this patient's level of swallowing impairment. Using the NOMS, the patient was determined to be at level 3 for swallow function which correlates with the CL= 60-79% level of severity. Based on the objective assessment provided within this note, the current, goal, and discharge g-codes are as follows: 
 
Swallow  Swallowing: 
 Swallow Current Status CL= 60-79%  Swallow Goal Status CK= 40-59% NOMS Swallowing Levels: 
Level 1 (CN): NPO Level 2 (CM): NPO but takes consistency in therapy Level 3 (CL): Takes less than 50% of nutrition p.o. and continues with nonoral feedings; and/or safe with mod cues; and/or max diet restriction Level 4 (CK): Safe swallow but needs mod cues; and/or mod diet restriction; and/or still requires some nonoral feeding/supplements Level 5 (CJ): Safe swallow with min diet restriction; and/or needs min cues Level 6 (CI): Independent with p.o.; rare cues; usually self cues; may need to avoid some foods or needs extra time Level 7 (CH): Independent for all p.o. JOSE J. (2003). National Outcomes Measurement System (NOMS): Adult Speech-Language Pathology User's Guide. Pain: 
Pain Scale 1: Numeric (0 - 10) Pain Intensity 1: 0 After treatment:  
[]            Patient left in no apparent distress sitting up in chair 
[x]            Patient left in no apparent distress in bed 
[x]            Call bell left within reach [x]            Nursing notified 
[x]            Caregiver present [x]            Bed alarm activated COMMUNICATION/EDUCATION:  
The patients plan of care including recommendations, planned interventions, and recommended diet changes were discussed with: Registered Nurse. [x]            Posted safety precautions in patient's room. [x]            Patient/family have participated as able in goal setting and plan of care. [x]            Patient/family agree to work toward stated goals and plan of care. []            Patient understands intent and goals of therapy, but is neutral about his/her participation. [x]            Patient is unable to participate in goal setting and plan of care. Thank you for this referral. 
Oralee Nail. Elba Cruz MS, CCC-SLP, BCS-S Time Calculation: 20 mins

## 2018-12-31 NOTE — PROGRESS NOTES
Problem: Self Care Deficits Care Plan (Adult) Goal: *Acute Goals and Plan of Care (Insert Text) Occupational Therapy Goals Initiated 12/30/2018 1. Patient will consistently perform self-feeding with supervision/set-up within 7 day(s). 2.  Patient will perform  3 grooming tasks with supervision/set-up within 7 day(s). 3.  Patient will perform upper body dressing with minimal assistance/contact guard assist within 7 day(s). 4.  Patient will perform toilet transfers with maximal assistance within 7 day(s). Occupational Therapy TREATMENT Patient: Bashir Tay (10 y.o. male) Date: 12/31/2018 Diagnosis: Hip fracture ([address] Hip fracture ([address] Closed displaced fracture of left femoral neck ([address] Procedure(s) (LRB): HIP HEMIARTHROPLASTY POSTERIOR LEFT (Left) 2 Days Post-Op Precautions: Total hip(posterior approach), ABD wedge in bed Chart, occupational therapy assessment, plan of care, and goals were reviewed. ASSESSMENT: 
Mr. Milton Mckeon was received in bed agreeable to activity. He reports 10/10 pain at all times throughout tx. Patient oriented to person and year only. Attempted re-orientation with minimal carryover. Additional activity focused on functional mobility in preparation for ADL transfers and improving general activity tolerance. Patient educated on bed mobility techniques and max A x2 needed to come to sitting EOB. With increased time in sitting and repositioning, patient with good sitting balance. Patient able to stand with max A x2 using rolling walker. He was incontinent of bowel and required total A for hygiene. Patient able to take several steps to [facility] and returned to bed with max A x2. Patient is slow to process, requires increased time and additional cuing (visual + tactile) to ensure carryover. Patient is making slow progress but cooperative throughout. All vitals remained stable. Would recommend SNF at discharge. Progression toward goals: []       Improving appropriately and progressing toward goals [x]       Improving slowly and progressing toward goals 
[]       Not making progress toward goals and plan of care will be adjusted PLAN: 
Patient continues to benefit from skilled intervention to address the above impairments. Continue treatment per established plan of care. Discharge Recommendations:  [facility] Further Equipment Recommendations for Discharge:  None at this time SUBJECTIVE:  
Patient stated \"Not good.   Patient stated this multiple times when asked how he was doing. OBJECTIVE DATA SUMMARY:  
Cognitive/Behavioral Status: 
Neurologic State: Alert Orientation Level: Oriented to person;Disoriented to situation;Disoriented to time;Disoriented to place Cognition: Decreased command following;Decreased attention/concentration;Poor safety awareness(slow to process) Perception: Appears intact Perseveration: No perseveration noted Safety/Judgement: Decreased awareness of environment;Decreased awareness of need for assistance;Decreased awareness of need for safety Functional Mobility and Transfers for ADLs:Bed Mobility: 
Supine to Sit: Maximum assistance;Assist x2 Sit to Supine: Maximum assistance;Assist x2 Scooting: Assist x2;Maximum assistance Transfers: 
Sit to Stand: Maximum assistance;Assist x2 Balance: 
Sitting: Intact; High guard Standing: Impaired;Pull to stand; With support Standing - Static: Constant support; Fair 
Standing - Dynamic : Poor ADL Intervention: Lower Body Dressing Assistance Dressing Assistance: Total assistance(dependent) Socks: Total assistance (dependent) Toileting Toileting Assistance: Total assistance(dependent) Bladder Hygiene: (condom cath) Bowel Hygiene: Total assistance (dependent)(incontinent of bowel) Cues: Verbal cues provided Cognitive Retraining Orientation Retraining: Place;Person;Time;Situation Attention to Task: Single task Following Commands: Follows one step commands/directions Safety/Judgement: Decreased awareness of environment;Decreased awareness of need for assistance;Decreased awareness of need for safety Cues: Verbal cues provided Pain: 
Pain Scale 1: Numeric (0 - 10) Pain Intensity 1: 0 Activity Tolerance:  
Patient tolerated tx well. Please refer to the flowsheet for vital signs taken during this treatment. After treatment:  
[] Patient left in no apparent distress sitting up in chair 
[x] Patient left in no apparent distress in bed 
[x] Call bell left within reach [x] Nursing notified 
[] Caregiver present [x] Bed alarm activated COMMUNICATION/COLLABORATION:  
The patients plan of care was discussed with: Physical Therapy Assistant, Registered Nurse and patient. Doris Mckay OTR/L Time Calculation: 36 mins

## 2018-12-31 NOTE — PROGRESS NOTES
Bedside and Verbal shift change report given to alistair (oncoming nurse) by Radha Martinez (offgoing nurse). Report included the following information SBAR, Kardex, Procedure Summary, Intake/Output, MAR, Recent Results and Med Rec Status. Bedside and Verbal shift change report given to Radha Martinez (oncoming nurse) by Yoli Sky (offgoing nurse). Report included the following information SBAR, Kardex, OR Summary, Intake/Output, MAR, Recent Results and Cardiac Rhythm sinus.

## 2018-12-31 NOTE — PROGRESS NOTES
Bedside and Verbal shift change report given to [facility] (oncoming nurse) by Marisol Martinez (offgoing nurse). Report included the following information SBAR, Kardex, ED Summary, Procedure Summary, Intake/Output, MAR, Recent Results and Cardiac Rhythm nsr. 
 
0820 Dr Shanti Jose on floor, given update. Pt has been NPO after failing STAND. Verbal order for ST consult, change tylenol to suppository, add daily prn dulcolax for constipation. Pageduar 18 at bedside, pt was on honey thick liquids PTA. Heel boots placed on pt, prn tylenol & dolcolax given Dr Haley Allan at bedside. 1144 per [facility] with ortho \"Please remove abduction brace and assess skin TID.  Must have abduction brace in place while in bed. \" 
 
[id number] pt had large BM while working with PTOT, cleaned, returned to bed 
 
1400 ST at bedside

## 2018-12-31 NOTE — CONSULTS
Full neuro consult dictated [id number]    Pt with potential seizure in setting of UTI and needing pain meds. Will change meds to IV until taking PO and increase vimpat to 150mg BID.  EEG pending    Carmina Epps MD

## 2018-12-31 NOTE — PROGRESS NOTES
Problem: Mobility Impaired (Adult and Pediatric) Goal: *Acute Goals and Plan of Care (Insert Text) Physical Therapy Goals Initiated 12/30/2018 1. Patient will move from supine to sit and sit to supine  in bed with moderate assistance  within 7 day(s). 2.  Patient will transfer from bed to chair and chair to bed with moderate assistance  using the least restrictive device within 7 day(s). 3.  Patient will perform sit to stand with moderate assistance  within 7 day(s). 4.  Patient will ambulate with moderate assistance  for 5 feet with the least restrictive device within 7 day(s). physical Therapy TREATMENT Patient: Yina Martinez (48 y.o. male) Date: 12/31/2018 Diagnosis: Hip fracture ([address] Hip fracture ([address] Closed displaced fracture of left femoral neck ([address] Procedure(s) (LRB): HIP HEMIARTHROPLASTY POSTERIOR LEFT (Left) 2 Days Post-Op Precautions: Total hip, Other (comment)(posterior approach), ABD wedge in bed,) Chart, physical therapy assessment, plan of care and goals were reviewed. ASSESSMENT: 
Pt received in bed, alert and  Confused. agreeable to participate with physical therapy. Reports 10/10 pain throughout session slow to respond to verbal cues. Max A x2 for supine >sitting EOB VSS. Max A x2 for sit<>stand, incontinent of bowel upon standing. tolerate supported standing x 2 min for hygiene. Side stepping toward HOB with max A x2, tactile cues to advance LLE. Pt returned to supine with Total A x2 ABD pillow in place. Will need SNF at discharge. Progression toward goals: 
[]    Improving appropriately and progressing toward goals [x]    Improving slowly and progressing toward goals 
[]    Not making progress toward goals and plan of care will be adjusted PLAN: 
Patient continues to benefit from skilled intervention to address the above impairments. Continue treatment per established plan of care. Discharge Recommendations:  [facility] Further Equipment Recommendations for Discharge:  Defer to rehab SUBJECTIVE:  
Patient stated this is alotI.  OBJECTIVE DATA SUMMARY:  
Critical Behavior: 
Neurologic State: Alert, Confused, Eyes open spontaneously Orientation Level: Oriented to person, Disoriented to time, Disoriented to situation, Disoriented to place Cognition: Decreased attention/concentration, Follows commands, Memory loss Safety/Judgement: Decreased awareness of environment, Decreased awareness of need for assistance, Decreased awareness of need for safety, Lack of insight into deficits Functional Mobility Training: 
Bed Mobility: 
  
Supine to Sit: Maximum assistance;Assist x2 Sit to Supine: Maximum assistance;Assist x2 Scooting: Assist x2;Maximum assistance Transfers: 
Sit to Stand: Maximum assistance;Assist x2 Stand to Sit: Maximum assistance;Assist x2 Balance: 
Sitting: Intact; High guard Standing: Impaired;Pull to stand; With support Standing - Static: Constant support; Fair 
Standing - Dynamic : PoorAmbulation/Gait Training: 
Distance (ft): 3 Feet (ft) Assistive Device: Walker, rolling;Gait belt Ambulation - Level of Assistance: Maximum assistance;Assist x2; Additional time Base of Support: Widened Stairs: 
  
  
   
 
Neuro Re-Education: 
 
Therapeutic Exercises:  
 
Pain: 
Pain Scale 1: Numeric (0 - 10) Pain Intensity 1: 0 Activity Tolerance:  
fair Please refer to the flowsheet for vital signs taken during this treatment. After treatment:  
[]    Patient left in no apparent distress sitting up in chair 
[x]    Patient left in no apparent distress in bed 
[x]    Call bell left within reach [x]    Nursing notified 
[]    Caregiver present [x]    Bed alarm activated COMMUNICATION/COLLABORATION:  
The patients plan of care was discussed with: Occupational Therapist and Registered Nurse Marita Aguirre Time Calculation: 24 mins

## 2018-12-31 NOTE — PROGRESS NOTES
Daily Progress Note: 12/31/2018 Nisreen Wilks,  Assessment/Plan:  
  Closed displaced fracture of left femoral neck (HCC) (12/27/2018)/ Acute mild T1 compression fracture. s/p posterior L hip hemiarthroplasty 12/29 
- pain management 
-PT/OT- to help with dispo planning Acute on chronic renal failure ([facility] (12/27/2018). Cr improving 
-  Continue IVF 
- monitor. Hypernatremia (12/27/2018). Improved with D5 
 - montior, cont D5 
  
  UTI (urinary tract infection) (12/27/2018)(POA). -  ceftriaxone - Urine cx: klebsiella 
  Thrombocytopenia ([facility] (12/27/2018), improved - Monitor.  
  
 Anxiety and depression (12/27/2018). - Continue home meds  
  
 BPH (benign prostatic hyperplasia) (12/27/2018). - Continue home meds  
  
 GERD (gastroesophageal reflux disease) (12/27/2018). - On PPI  
  
 HTN (hypertension) (12/27/2018). - Continue metoprolol and imdur  
  
  Seizure ([facility] (12/27/2018). - On vimpat and valproic acid. - Seizure precaution. Check valproic acid.  
   
 Fall (12/27/2018). Fall precaution  
  
 Dementia (12/27/2018). - Continue aricept and namenda. - Supportive care  
  
 Protein- calorie malnutrition.  
- Nutrition consult.  
  
 Hypertroponinemia [not clinically relevant], has stabilized. Not MI, likely heart strain. 
- Has been cleared by Cardiology DVT BLE 
- IVC filter placed 12/28 Problem List: 
Problem List as of 12/31/2018 Date Reviewed: 12/27/2018 Codes Class Noted - Resolved Hip fracture ([facility] ICD-10-CM: X04.590T ICD-9-CM: 820.8  12/27/2018 - Present Thrombocytopenia ([facility] ICD-10-CM: D69.6 ICD-9-CM: 287.5  12/27/2018 - Present Anxiety and depression ICD-10-CM: F41.9, F32.9 ICD-9-CM: 300.00, 311  12/27/2018 - Present BPH (benign prostatic hyperplasia) ICD-10-CM: N40.0 ICD-9-CM: 600.00  12/27/2018 - Present GERD (gastroesophageal reflux disease) ICD-10-CM: K21.9 ICD-9-CM: 530.81  12/27/2018 - Present HTN (hypertension) ICD-10-CM: I10 
ICD-9-CM: 401.9  12/27/2018 - Present Seizure ([facility] ICD-10-CM: R56.9 ICD-9-CM: 780.39  12/27/2018 - Present UTI (urinary tract infection) ICD-10-CM: N39.0 ICD-9-CM: 599.0  12/27/2018 - Present Hypernatremia ICD-10-CM: E87.0 ICD-9-CM: 276.0  12/27/2018 - Present Acute on chronic renal failure (HCC) ICD-10-CM: N17.9, N18.9 ICD-9-CM: 584.9, 585.9  12/27/2018 - Present * (Principal) Closed displaced fracture of left femoral neck ([facility] ICD-10-CM: Z48.652R ICD-9-CM: 820.8  12/27/2018 - Present Fall ICD-10-CM: W19. Montine Living ICD-9-CM: E888.9  12/27/2018 - Present Dementia ICD-10-CM: F03.90 ICD-9-CM: 294.20  12/27/2018 - Present T1 vertebral fracture ([facility] ICD-10-CM: I66.379V 
ICD-9-CM: 805.2  12/27/2018 - Present Trimalleolar fracture of right ankle ICD-10-CM: C06.187W ICD-9-CM: 824.6  10/24/2012 - Present HPI:  
Mr. Sharon Carrizales is a 68 y.o.  male who is admitted with femoral fracture. Mr. Sharon Carrizales with PMH of anxiety, depression, GERD, SZD, fall presented to ER after a fall yesterday evening. Pt is in a SNF rehab center since last week, where he was admitted due to fall. Has been falling frequently recently. Yesterday evening, he fell and hit his head and LT hip. This morning, he was found by nursing staff. Pt has dementia and unable to provide Hx.  
 
12/28: Appears comfortable. Confused this am but does point to the left leg when asked about pain. Has been cleared by cardiology for surgery. To have IVC filter placed before surgery. 12/29: IVC filter placed yesterday by [facility] for L hip ORIF today at 2pm.  Pt feeling ok. No current complaints. NPO this AM. 
 
12/30: post-op day 1. Sodium is improved. Feeling better. Pleasantly confused. Sitting up eating breakfast. Still on voiding trial after santos was removed. :  He reports no pain. He knows he is in hospital but not which one. Events of yesterday reviewed - poss seizure or syncopal episode yesterday and transferred to tele floor. Failed swallowing eval and currently NPO pending Speech Eval.  EEG planned for today. Neuro to see. Hb 8.2 - monitoring. Review of Systems:  
Review of systems not obtained due to patient factors. Objective:  
Physical Exam:  
 
Visit Vitals /71 (BP 1 Location: Left arm, BP Patient Position: At rest) Pulse 99 Temp 98.9 °F (37.2 °C) Resp 18 Ht 6' (1.829 m) Wt 79.8 kg (175 lb 14.4 oz) SpO2 95% BMI 23.86 kg/m² O2 Flow Rate (L/min): 2 l/min O2 Device: Nasal cannula Temp (24hrs), Av.3 °F (36.8 °C), Min:97.9 °F (36.6 °C), Max:98.9 °F (37.2 °C) No intake/output data recorded.  1901 -  0700 In: 200 [I.V.:200] Out: 1825 [[facility]:6106] General:  Alert, thin frail elderly male in NAD Head:  Normocephalic, without obvious abnormality, atraumatic. Eyes:  Conjunctivae/corneas clear. PERRL, EOMs intact. Throat: Lips, mucosa, and tongue dry. Teeth and gums normal.  
Neck: Dressing over R neck C/D. Supple, symmetrical, trachea midline, no adenopathy, thyroid: no enlargement/tenderness/nodules, no carotid bruit and no JVD. Lungs:   Clear to auscultation bilaterally. Chest wall:  No tenderness or deformity. Heart:  Regular rate and rhythm, S1, S2 normal, no murmur, click, rub or gallop. Abdomen:   Soft, non-tender. Bowel sounds normal. No masses,  No organomegaly. Extremities: LLE with clean/dry dressing. No calf tenderness or cords. Pulses: 2+ and symmetric all extremities. Skin: Skin color, texture, turgor normal. No rashes or lesions Neurologic: CNII-XII intact. Alert and oriented X 1. Fine motor of hands and fingers normal.   equal.   Sensation grossly normal to touch. Data Review:  
   
Recent Days: 
Recent Labs 18 
0450 18 
1508 18 0040 12/29/18 
9463 WBC 9.1  --  7.2 6.7 HGB 8.2* 9.3* 9.4* 10.0* HCT 25.8* 30.5* 30.4* 32.1*  
  --  164 155 Recent Labs 12/31/18 
0450 12/30/18 
1508 12/30/18 
0040  12/29/18 
9746 * 141 147*   < > 154* K 3.6 3.7 3.7   < > 4.0  
* 106 112*   < > 117* CO2 26 24 26   < > 29 * 149* 131*   < > 114* BUN 27* 27* 27*   < > 27* CREA 2.08* 2.37* 2.07*   < > 2.17* CA 8.6 8.3* 8.4*   < > 8.7 MG  --  1.6  --   --   --   
PHOS 3.1 3.0 3.0  --   --   
ALB 1.6* 1.8* 1.9*  --  2.0*  
TBILI  --  0.3  --   --  0.2 SGOT  --  16  --   --  15 ALT  --  9*  --   --  14  
 < > = values in this interval not displayed. No results for input(s): PH, PCO2, PO2, HCO3, FIO2 in the last 72 hours. 24 Hour Results: 
Recent Results (from the past 24 hour(s)) GLUCOSE, POC Collection Time: 12/30/18  2:54 PM  
Result Value Ref Range Glucose (POC) 146 (H) 65 - 100 mg/dL Performed by Virgie Kelly HGB & HCT Collection Time: 12/30/18  3:08 PM  
Result Value Ref Range HGB 9.3 (L) 12.1 - 17.0 g/dL HCT 30.5 (L) 36.6 - 50.3 % METABOLIC PANEL, COMPREHENSIVE Collection Time: 12/30/18  3:08 PM  
Result Value Ref Range Sodium 141 136 - 145 mmol/L Potassium 3.7 3.5 - 5.1 mmol/L Chloride 106 97 - 108 mmol/L  
 CO2 24 21 - 32 mmol/L Anion gap 11 5 - 15 mmol/L Glucose 149 (H) 65 - 100 mg/dL BUN 27 (H) 6 - 20 MG/DL Creatinine 2.37 (H) 0.70 - 1.30 MG/DL  
 BUN/Creatinine ratio 11 (L) 12 - 20 GFR est AA 33 (L) >60 ml/min/1.73m2 GFR est non-AA 27 (L) >60 ml/min/1.73m2 Calcium 8.3 (L) 8.5 - 10.1 MG/DL Bilirubin, total 0.3 0.2 - 1.0 MG/DL  
 ALT (SGPT) 9 (L) 12 - 78 U/L  
 AST (SGOT) 16 15 - 37 U/L Alk. phosphatase 55 45 - 117 U/L Protein, total 5.6 (L) 6.4 - 8.2 g/dL Albumin 1.8 (L) 3.5 - 5.0 g/dL Globulin 3.8 2.0 - 4.0 g/dL A-G Ratio 0.5 (L) 1.1 - 2.2 MAGNESIUM  Collection Time: 12/30/18  3:08 PM  
 Result Value Ref Range Magnesium 1.6 1.6 - 2.4 mg/dL PHOSPHORUS Collection Time: 12/30/18  3:08 PM  
Result Value Ref Range Phosphorus 3.0 2.6 - 4.7 MG/DL  
VALPROIC ACID Collection Time: 12/30/18  3:08 PM  
Result Value Ref Range Valproic acid 55 50 - 100 ug/ml TROPONIN I Collection Time: 12/30/18  3:08 PM  
Result Value Ref Range Troponin-I, Qt. <0.05 <0.05 ng/mL RENAL FUNCTION PANEL Collection Time: 12/31/18  4:50 AM  
Result Value Ref Range Sodium 146 (H) 136 - 145 mmol/L Potassium 3.6 3.5 - 5.1 mmol/L Chloride 111 (H) 97 - 108 mmol/L  
 CO2 26 21 - 32 mmol/L Anion gap 9 5 - 15 mmol/L Glucose 117 (H) 65 - 100 mg/dL BUN 27 (H) 6 - 20 MG/DL Creatinine 2.08 (H) 0.70 - 1.30 MG/DL  
 BUN/Creatinine ratio 13 12 - 20 GFR est AA 38 (L) >60 ml/min/1.73m2 GFR est non-AA 31 (L) >60 ml/min/1.73m2 Calcium 8.6 8.5 - 10.1 MG/DL Phosphorus 3.1 2.6 - 4.7 MG/DL Albumin 1.6 (L) 3.5 - 5.0 g/dL CBC WITH AUTOMATED DIFF Collection Time: 12/31/18  4:50 AM  
Result Value Ref Range WBC 9.1 4.1 - 11.1 K/uL  
 RBC 2.81 (L) 4.10 - 5.70 M/uL HGB 8.2 (L) 12.1 - 17.0 g/dL HCT 25.8 (L) 36.6 - 50.3 % MCV 91.8 80.0 - 99.0 FL  
 MCH 29.2 26.0 - 34.0 PG  
 MCHC 31.8 30.0 - 36.5 g/dL  
 RDW 14.3 11.5 - 14.5 % PLATELET 802 770 - 421 K/uL MPV 11.9 8.9 - 12.9 FL  
 NRBC 0.0 0  WBC ABSOLUTE NRBC 0.00 0.00 - 0.01 K/uL NEUTROPHILS 78 (H) 32 - 75 % LYMPHOCYTES 10 (L) 12 - 49 % MONOCYTES 10 5 - 13 % EOSINOPHILS 0 0 - 7 % BASOPHILS 0 0 - 1 % MYELOCYTES 2 (H) 0 % IMMATURE GRANULOCYTES 0 %  
 ABS. NEUTROPHILS 7.1 1.8 - 8.0 K/UL  
 ABS. LYMPHOCYTES 0.9 0.8 - 3.5 K/UL  
 ABS. MONOCYTES 0.9 0.0 - 1.0 K/UL  
 ABS. EOSINOPHILS 0.0 0.0 - 0.4 K/UL  
 ABS. BASOPHILS 0.0 0.0 - 0.1 K/UL  
 ABS. IMM. GRANS. 0.0 K/UL  
 DF MANUAL    
 RBC COMMENTS OVALOCYTES PRESENT 
    
GLUCOSE, POC Collection Time: 12/31/18  5:59 AM  
Result Value Ref Range Glucose (POC) 99 65 - 100 mg/dL Performed by Melinda Lopez Medications reviewed Current Facility-Administered Medications Medication Dose Route Frequency  valproate (DEPACON) 250 mg in 0.9% sodium chloride 50 mL IVPB  250 mg IntraVENous Q6H  
 dextrose 5% infusion  100 mL/hr IntraVENous CONTINUOUS  
 sodium chloride (NS) flush 5-10 mL  5-10 mL IntraVENous Q8H  
 sodium chloride (NS) flush 5-10 mL  5-10 mL IntraVENous PRN  
 acetaminophen (TYLENOL) tablet 650 mg  650 mg Oral Q6H  
 naloxone (NARCAN) injection 0.4 mg  0.4 mg IntraVENous PRN  
 calcium-vitamin D (OS-NITA) 500 mg-200 unit tablet  1 Tab Oral TID WITH MEALS  senna-docusate (PERICOLACE) 8.6-50 mg per tablet 1 Tab  1 Tab Oral BID  polyethylene glycol (MIRALAX) packet 17 g  17 g Oral DAILY  bisacodyl (DULCOLAX) suppository 10 mg  10 mg Rectal DAILY PRN  
 heparin (porcine) injection 5,000 Units  5,000 Units SubCUTAneous Q8H  
 morphine injection 2 mg  2 mg IntraVENous Q4H PRN  
 sodium chloride (NS) flush 5-10 mL  5-10 mL IntraVENous Q8H  
 sodium chloride (NS) flush 5-10 mL  5-10 mL IntraVENous PRN  
 cefTRIAXone (ROCEPHIN) 1 g in 0.9% sodium chloride (MBP/ADV) 50 mL  1 g IntraVENous Q24H  
 HYDROcodone-acetaminophen (NORCO) 5-325 mg per tablet 1 Tab  1 Tab Oral Q6H PRN  
 acetaminophen (TYLENOL) tablet 650 mg  650 mg Oral Q6H PRN  
 albuterol (PROVENTIL VENTOLIN) nebulizer solution 2.5 mg  2.5 mg Nebulization Q4H PRN  
 atorvastatin (LIPITOR) tablet 40 mg  40 mg Oral DAILY  donepezil (ARICEPT) tablet 10 mg  10 mg Oral QHS  pantoprazole (PROTONIX) tablet 40 mg  40 mg Oral DAILY  isosorbide mononitrate ER (IMDUR) tablet 30 mg  30 mg Oral DAILY  lacosamide (VIMPAT) tablet 100 mg  100 mg Oral BID  memantine (NAMENDA) tablet 10 mg  10 mg Oral QHS  metoprolol tartrate (LOPRESSOR) tablet 12.5 mg  12.5 mg Oral BID  
 oxybutynin chloride XL (DITROPAN XL) tablet 10 mg  10 mg Oral DAILY Care Plan discussed with: Patient/Family and Nurse Total time spent with patient and review of records: 30 minutes.  
 
Dominguez Wharton MD

## 2019-01-01 LAB
ALBUMIN SERPL-MCNC: 1.5 G/DL (ref 3.5–5)
ANION GAP SERPL CALC-SCNC: 7 MMOL/L (ref 5–15)
ATRIAL RATE: 110 BPM
BASOPHILS # BLD: 0 K/UL (ref 0–0.1)
BASOPHILS NFR BLD: 0 % (ref 0–1)
BUN SERPL-MCNC: 30 MG/DL (ref 6–20)
BUN/CREAT SERPL: 15 (ref 12–20)
CALCIUM SERPL-MCNC: 8.5 MG/DL (ref 8.5–10.1)
CALCULATED P AXIS, ECG09: 51 DEGREES
CALCULATED R AXIS, ECG10: 69 DEGREES
CALCULATED T AXIS, ECG11: 51 DEGREES
CHLORIDE SERPL-SCNC: 111 MMOL/L (ref 97–108)
CO2 SERPL-SCNC: 27 MMOL/L (ref 21–32)
CREAT SERPL-MCNC: 2.01 MG/DL (ref 0.7–1.3)
DIAGNOSIS, 93000: NORMAL
DIFFERENTIAL METHOD BLD: ABNORMAL
EOSINOPHIL # BLD: 0.2 K/UL (ref 0–0.4)
EOSINOPHIL NFR BLD: 2 % (ref 0–7)
ERYTHROCYTE [DISTWIDTH] IN BLOOD BY AUTOMATED COUNT: 14.6 % (ref 11.5–14.5)
GLUCOSE BLD STRIP.AUTO-MCNC: 113 MG/DL (ref 65–100)
GLUCOSE BLD STRIP.AUTO-MCNC: 128 MG/DL (ref 65–100)
GLUCOSE SERPL-MCNC: 99 MG/DL (ref 65–100)
HCT VFR BLD AUTO: 26 % (ref 36.6–50.3)
HGB BLD-MCNC: 8.2 G/DL (ref 12.1–17)
IMM GRANULOCYTES # BLD: 0 K/UL
IMM GRANULOCYTES NFR BLD AUTO: 0 %
LYMPHOCYTES # BLD: 1.3 K/UL (ref 0.8–3.5)
LYMPHOCYTES NFR BLD: 13 % (ref 12–49)
MCH RBC QN AUTO: 29.1 PG (ref 26–34)
MCHC RBC AUTO-ENTMCNC: 31.5 G/DL (ref 30–36.5)
MCV RBC AUTO: 92.2 FL (ref 80–99)
METAMYELOCYTES NFR BLD MANUAL: 2 %
MONOCYTES # BLD: 0.8 K/UL (ref 0–1)
MONOCYTES NFR BLD: 8 % (ref 5–13)
NEUTS BAND NFR BLD MANUAL: 1 % (ref 0–6)
NEUTS SEG # BLD: 7.3 K/UL (ref 1.8–8)
NEUTS SEG NFR BLD: 74 % (ref 32–75)
NRBC # BLD: 0 K/UL (ref 0–0.01)
NRBC BLD-RTO: 0 PER 100 WBC
P-R INTERVAL, ECG05: 122 MS
PHOSPHATE SERPL-MCNC: 3.1 MG/DL (ref 2.6–4.7)
PLATELET # BLD AUTO: 203 K/UL (ref 150–400)
PMV BLD AUTO: 12.1 FL (ref 8.9–12.9)
POTASSIUM SERPL-SCNC: 3.7 MMOL/L (ref 3.5–5.1)
Q-T INTERVAL, ECG07: 366 MS
QRS DURATION, ECG06: 112 MS
QTC CALCULATION (BEZET), ECG08: 495 MS
RBC # BLD AUTO: 2.82 M/UL (ref 4.1–5.7)
RBC MORPH BLD: ABNORMAL
SERVICE CMNT-IMP: ABNORMAL
SERVICE CMNT-IMP: ABNORMAL
SODIUM SERPL-SCNC: 145 MMOL/L (ref 136–145)
VENTRICULAR RATE, ECG03: 110 BPM
WBC # BLD AUTO: 9.7 K/UL (ref 4.1–11.1)

## 2019-01-01 PROCEDURE — 74011250637 HC RX REV CODE- 250/637: Performed by: PSYCHIATRY & NEUROLOGY

## 2019-01-01 PROCEDURE — 74011250637 HC RX REV CODE- 250/637: Performed by: ORTHOPAEDIC SURGERY

## 2019-01-01 PROCEDURE — 74011000258 HC RX REV CODE- 258: Performed by: PSYCHIATRY & NEUROLOGY

## 2019-01-01 PROCEDURE — 74011000258 HC RX REV CODE- 258: Performed by: FAMILY MEDICINE

## 2019-01-01 PROCEDURE — 77010033678 HC OXYGEN DAILY

## 2019-01-01 PROCEDURE — C9254 INJECTION, LACOSAMIDE: HCPCS | Performed by: PSYCHIATRY & NEUROLOGY

## 2019-01-01 PROCEDURE — 82962 GLUCOSE BLOOD TEST: CPT

## 2019-01-01 PROCEDURE — 94760 N-INVAS EAR/PLS OXIMETRY 1: CPT

## 2019-01-01 PROCEDURE — 97110 THERAPEUTIC EXERCISES: CPT

## 2019-01-01 PROCEDURE — 85025 COMPLETE CBC W/AUTO DIFF WBC: CPT

## 2019-01-01 PROCEDURE — 92526 ORAL FUNCTION THERAPY: CPT

## 2019-01-01 PROCEDURE — 74011250636 HC RX REV CODE- 250/636: Performed by: PSYCHIATRY & NEUROLOGY

## 2019-01-01 PROCEDURE — 74011250636 HC RX REV CODE- 250/636: Performed by: ORTHOPAEDIC SURGERY

## 2019-01-01 PROCEDURE — 74011250636 HC RX REV CODE- 250/636: Performed by: FAMILY MEDICINE

## 2019-01-01 PROCEDURE — 74011000250 HC RX REV CODE- 250: Performed by: ORTHOPAEDIC SURGERY

## 2019-01-01 PROCEDURE — 74011250637 HC RX REV CODE- 250/637: Performed by: INTERNAL MEDICINE

## 2019-01-01 PROCEDURE — 80069 RENAL FUNCTION PANEL: CPT

## 2019-01-01 PROCEDURE — 97530 THERAPEUTIC ACTIVITIES: CPT

## 2019-01-01 PROCEDURE — C1758 CATHETER, URETERAL: HCPCS

## 2019-01-01 PROCEDURE — 65270000029 HC RM PRIVATE

## 2019-01-01 PROCEDURE — 74011000250 HC RX REV CODE- 250: Performed by: FAMILY MEDICINE

## 2019-01-01 PROCEDURE — 36415 COLL VENOUS BLD VENIPUNCTURE: CPT

## 2019-01-01 RX ORDER — LACOSAMIDE 100 MG/1
150 TABLET ORAL EVERY 12 HOURS
Status: DISCONTINUED | OUTPATIENT
Start: 2019-01-01 | End: 2019-01-02 | Stop reason: HOSPADM

## 2019-01-01 RX ORDER — VALPROIC ACID 250 MG/1
500 CAPSULE, LIQUID FILLED ORAL 2 TIMES DAILY
Status: DISCONTINUED | OUTPATIENT
Start: 2019-01-01 | End: 2019-01-02 | Stop reason: HOSPADM

## 2019-01-01 RX ADMIN — DEXTROSE MONOHYDRATE 100 ML/HR: 5 INJECTION, SOLUTION INTRAVENOUS at 00:07

## 2019-01-01 RX ADMIN — OXYBUTYNIN CHLORIDE 10 MG: 5 TABLET, EXTENDED RELEASE ORAL at 09:04

## 2019-01-01 RX ADMIN — Medication 5 ML: at 06:00

## 2019-01-01 RX ADMIN — DEXTROSE MONOHYDRATE 100 ML/HR: 5 INJECTION, SOLUTION INTRAVENOUS at 09:55

## 2019-01-01 RX ADMIN — ATORVASTATIN CALCIUM 40 MG: 20 TABLET, FILM COATED ORAL at 09:03

## 2019-01-01 RX ADMIN — POLYETHYLENE GLYCOL 3350 17 G: 17 POWDER, FOR SOLUTION ORAL at 09:08

## 2019-01-01 RX ADMIN — DONEPEZIL HYDROCHLORIDE 10 MG: 5 TABLET, FILM COATED ORAL at 21:59

## 2019-01-01 RX ADMIN — PANTOPRAZOLE SODIUM 40 MG: 40 TABLET, DELAYED RELEASE ORAL at 09:03

## 2019-01-01 RX ADMIN — OYSTER SHELL CALCIUM WITH VITAMIN D 1 TABLET: 500; 200 TABLET, FILM COATED ORAL at 12:04

## 2019-01-01 RX ADMIN — HEPARIN SODIUM 5000 UNITS: 5000 INJECTION INTRAVENOUS; SUBCUTANEOUS at 04:13

## 2019-01-01 RX ADMIN — HEPARIN SODIUM 5000 UNITS: 5000 INJECTION INTRAVENOUS; SUBCUTANEOUS at 12:02

## 2019-01-01 RX ADMIN — VALPROATE SODIUM 250 MG: 100 INJECTION, SOLUTION INTRAVENOUS at 09:10

## 2019-01-01 RX ADMIN — SODIUM CHLORIDE 150 MG: 900 INJECTION, SOLUTION INTRAVENOUS at 00:07

## 2019-01-01 RX ADMIN — HEPARIN SODIUM 5000 UNITS: 5000 INJECTION INTRAVENOUS; SUBCUTANEOUS at 21:59

## 2019-01-01 RX ADMIN — METOPROLOL TARTRATE 12.5 MG: 25 TABLET ORAL at 09:02

## 2019-01-01 RX ADMIN — DOCUSATE SODIUM AND SENNOSIDES 1 TABLET: 8.6; 5 TABLET, FILM COATED ORAL at 17:31

## 2019-01-01 RX ADMIN — VALPROATE SODIUM 250 MG: 100 INJECTION, SOLUTION INTRAVENOUS at 04:13

## 2019-01-01 RX ADMIN — ISOSORBIDE MONONITRATE 30 MG: 30 TABLET, EXTENDED RELEASE ORAL at 09:02

## 2019-01-01 RX ADMIN — OYSTER SHELL CALCIUM WITH VITAMIN D 1 TABLET: 500; 200 TABLET, FILM COATED ORAL at 09:01

## 2019-01-01 RX ADMIN — VALPROIC ACID 500 MG: 250 CAPSULE, LIQUID FILLED ORAL at 17:31

## 2019-01-01 RX ADMIN — MEMANTINE 10 MG: 10 TABLET ORAL at 21:59

## 2019-01-01 RX ADMIN — DOCUSATE SODIUM AND SENNOSIDES 1 TABLET: 8.6; 5 TABLET, FILM COATED ORAL at 09:03

## 2019-01-01 RX ADMIN — OYSTER SHELL CALCIUM WITH VITAMIN D 1 TABLET: 500; 200 TABLET, FILM COATED ORAL at 17:32

## 2019-01-01 RX ADMIN — LACOSAMIDE 150 MG: 100 TABLET, FILM COATED ORAL at 21:58

## 2019-01-01 RX ADMIN — METOPROLOL TARTRATE 12.5 MG: 25 TABLET ORAL at 17:32

## 2019-01-01 RX ADMIN — SODIUM CHLORIDE 150 MG: 900 INJECTION, SOLUTION INTRAVENOUS at 11:55

## 2019-01-01 NOTE — PROGRESS NOTES
Bedside and Verbal shift change report given to Shelley Rolon RN (oncoming nurse) by Betty Sullivan RN (offgoing nurse). Report included the following information SBAR, Kardex, ED Summary, Procedure Summary, Intake/Output, Recent Results, Med Rec Status and Cardiac Rhythm NSR with BBB. Bedside and Verbal shift change report given to Betty Sullivan RN (oncoming nurse) by Shelley Rolon RN (offgoing nurse). Report included the following information SBAR, Kardex, ED Summary, Procedure Summary, Intake/Output, Recent Results, Med Rec Status and Cardiac Rhythm NSR with BBB.

## 2019-01-01 NOTE — PROGRESS NOTES
Bedside and Verbal shift change report given to alistair (oncoming nurse) by Radha Martinez (offgoing nurse). Report included the following information SBAR, Kardex, Intake/Output, Recent Results and Cardiac Rhythm sinus.

## 2019-01-01 NOTE — PROGRESS NOTES
Daily Progress Note: 1/1/2019 Yuly Thorne, DO Assessment/Plan:  
  Closed displaced fracture of left femoral neck (HCC) (12/27/2018)/ Acute mild T1 compression fracture. s/p posterior L hip hemiarthroplasty 12/29 
- pain management 
-PT/OT- to help with dispo planning Acute on chronic renal failure ([facility] (12/27/2018). Cr improving 
-  Continue IVF 
- monitor. Hypernatremia (12/27/2018). Improved with D5 
- decrease D5. If eating better, may be able to stop tomorrow 
  
  UTI (urinary tract infection) (12/27/2018)(POA). -  ceftriaxone - Urine cx: klebsiella 
  Thrombocytopenia ([facility] (12/27/2018), improved - Monitor.  
  
 Anxiety and depression (12/27/2018). - Continue home meds  
  
 BPH (benign prostatic hyperplasia) (12/27/2018). - Continue home meds  
  
 GERD (gastroesophageal reflux disease) (12/27/2018). - On PPI  
  
 HTN (hypertension) (12/27/2018). - Continue metoprolol and imdur  
  
  Seizure ([facility] (12/27/2018). Likely occurred this admission - On vimpat and valproic acid was increased 
- EEG completed 
-neuro managing  
   
 Fall (12/27/2018). Fall precaution  
  
 Dementia (12/27/2018). - Continue aricept and namenda. - Supportive care  
  
 Protein- calorie malnutrition.  
- Nutrition consult.  
  
 Hypertroponinemia [not clinically relevant], has stabilized. Not MI, likely heart strain. 
- Has been cleared by Cardiology DVT BLE 
- IVC filter placed 12/28 Problem List: 
Problem List as of 1/1/2019 Date Reviewed: 12/27/2018 Codes Class Noted - Resolved Hip fracture ([facility] [address] ICD-10-CM: F09.510V ICD-9-CM: 820.8  12/27/2018 - Present Thrombocytopenia ([facility] ICD-10-CM: D69.6 ICD-9-CM: 287.5  12/27/2018 - Present Anxiety and depression ICD-10-CM: F41.9, F32.9 ICD-9-CM: 300.00, 311  12/27/2018 - Present BPH (benign prostatic hyperplasia) ICD-10-CM: N40.0 ICD-9-CM: 600.00  12/27/2018 - Present GERD (gastroesophageal reflux disease) ICD-10-CM: K21.9 ICD-9-CM: 530.81  12/27/2018 - Present HTN (hypertension) ICD-10-CM: I10 
ICD-9-CM: 401.9  12/27/2018 - Present Seizure ([facility] ICD-10-CM: R56.9 ICD-9-CM: 780.39  12/27/2018 - Present UTI (urinary tract infection) ICD-10-CM: N39.0 ICD-9-CM: 599.0  12/27/2018 - Present Hypernatremia ICD-10-CM: E87.0 ICD-9-CM: 276.0  12/27/2018 - Present Acute on chronic renal failure (HCC) ICD-10-CM: N17.9, N18.9 ICD-9-CM: 584.9, 585.9  12/27/2018 - Present * (Principal) Closed displaced fracture of left femoral neck ([facility] ICD-10-CM: K53.607B ICD-9-CM: 820.8  12/27/2018 - Present Fall ICD-10-CM: W19. Emerita Changer ICD-9-CM: E888.9  12/27/2018 - Present Dementia ICD-10-CM: F03.90 ICD-9-CM: 294.20  12/27/2018 - Present T1 vertebral fracture ([facility] ICD-10-CM: U36.621C 
ICD-9-CM: 805.2  12/27/2018 - Present Trimalleolar fracture of right ankle ICD-10-CM: E00.974T ICD-9-CM: 824.6  10/24/2012 - Present HPI:  
Mr. Beny Sandoval is a 68 y.o.  male who is admitted with femoral fracture. Mr. Beny Sandoval with PMH of anxiety, depression, GERD, SZD, fall presented to ER after a fall yesterday evening. Pt is in a SNF rehab center since last week, where he was admitted due to fall. Has been falling frequently recently. Yesterday evening, he fell and hit his head and LT hip. This morning, he was found by nursing staff. Pt has dementia and unable to provide Hx.  
 
12/28: Appears comfortable. Confused this am but does point to the left leg when asked about pain. Has been cleared by cardiology for surgery. To have IVC filter placed before surgery. 12/29: IVC filter placed yesterday by [facility] for L hip ORIF today at 2pm.  Pt feeling ok. No current complaints. NPO this AM. 
 
12/30: post-op day 1. Sodium is improved. Feeling better.   Pleasantly confused. Sitting up eating breakfast. Still on voiding trial after santos was removed. :  He reports no pain. He knows he is in hospital but not which one. Events of yesterday reviewed - poss seizure or syncopal episode yesterday and transferred to tele floor. Failed swallowing eval and currently NPO pending Speech Eval.  EEG planned for today. Neuro to see. Hb 8.2 - monitoring. 19: Neuro felt he prob had seizure due to UTI and pain meds. Adjusted AEDs. Speech rec Honey thick liquids with mech soft. Tolerating PO, +BM yesterday. PT working with PT/OT. Pt says he is feeling a little depressed. Wanting to get back to his normal routine. Denies SI. Review of Systems:  
Review of systems not obtained due to patient factors. Objective:  
Physical Exam:  
 
Visit Vitals BP (!) 148/95 (BP 1 Location: Left arm, BP Patient Position: At rest) Pulse 80 Temp 97.4 °F (36.3 °C) Resp 20 Ht 6' (1.829 m) Wt 80.1 kg (176 lb 8 oz) SpO2 100% BMI 23.94 kg/m² O2 Flow Rate (L/min): 2.5 l/min O2 Device: Nasal cannula Temp (24hrs), Av.7 °F (37.1 °C), Min:97.4 °F (36.3 °C), Max:100 °F (37.8 °C) No intake/output data recorded. 1 -  0700 In: 1626.7 [P.O.:150; I.V.:1476.7] Out: 0697 [Urine:5] General:  Alert, thin frail elderly male in NAD Head:  Normocephalic, without obvious abnormality, atraumatic. Eyes:  Conjunctivae/corneas clear. PERRL, EOMs intact. Throat: Lips, mucosa, and tongue dry. Teeth and gums normal.  
Neck: Dressing over R neck C/D. Supple, symmetrical, trachea midline, no adenopathy, thyroid: no enlargement/tenderness/nodules, no carotid bruit and no JVD. Lungs:   Clear to auscultation bilaterally. Chest wall:  No tenderness or deformity. Heart:  Regular rate and rhythm, S1, S2 normal, no murmur, click, rub or gallop. Abdomen:   Soft, non-tender. Bowel sounds normal. No masses,  No organomegaly. Extremities: LLE with clean/dry dressing. No calf tenderness or cords. Pulses: 2+ and symmetric all extremities. Skin: Skin color, texture, turgor normal. No rashes or lesions Neurologic: CNII-XII intact. Alert and oriented X 1. Fine motor of hands and fingers normal.   equal.   Sensation grossly normal to touch. Data Review:  
   
Recent Days: 
Recent Labs 01/01/19 0102 12/31/18 691 333 981 12/31/18 
0450 WBC 9.7 9.7 9.1 HGB 8.2* 8.6* 8.2* HCT 26.0* 27.3* 25.8*  193 166 Recent Labs 01/01/19 
0102 12/31/18 
3082 12/30/18 
1508  146* 141  
K 3.7 3.6 3.7 * 111* 106 CO2 27 26 24 GLU 99 117* 149* BUN 30* 27* 27* CREA 2.01* 2.08* 2.37* CA 8.5 8.6 8.3*  
MG  --   --  1.6 PHOS 3.1 3.1 3.0 ALB 1.5* 1.6* 1.8* TBILI  --   --  0.3 SGOT  --   --  16 ALT  --   --  9* No results for input(s): PH, PCO2, PO2, HCO3, FIO2 in the last 72 hours. 24 Hour Results: 
Recent Results (from the past 24 hour(s)) GLUCOSE, POC Collection Time: 12/31/18 12:07 PM  
Result Value Ref Range Glucose (POC) 106 (H) 65 - 100 mg/dL Performed by Sandro Paredes (PCT) CBC W/O DIFF Collection Time: 12/31/18 12:10 PM  
Result Value Ref Range WBC 9.7 4.1 - 11.1 K/uL  
 RBC 2.96 (L) 4.10 - 5.70 M/uL HGB 8.6 (L) 12.1 - 17.0 g/dL HCT 27.3 (L) 36.6 - 50.3 % MCV 92.2 80.0 - 99.0 FL  
 MCH 29.1 26.0 - 34.0 PG  
 MCHC 31.5 30.0 - 36.5 g/dL  
 RDW 14.6 (H) 11.5 - 14.5 % PLATELET 839 673 - 053 K/uL MPV 11.5 8.9 - 12.9 FL  
 NRBC 0.0 0  WBC ABSOLUTE NRBC 0.00 0.00 - 0.01 K/uL GLUCOSE, POC Collection Time: 12/31/18  4:30 PM  
Result Value Ref Range Glucose (POC) 204 (H) 65 - 100 mg/dL Performed by [facility] (PCT) GLUCOSE, POC Collection Time: 12/31/18  8:57 PM  
Result Value Ref Range Glucose (POC) 135 (H) 65 - 100 mg/dL Performed by [facility] (PCT) RENAL FUNCTION PANEL  Collection Time: 01/01/19  1:02 AM  
 Result Value Ref Range Sodium 145 136 - 145 mmol/L Potassium 3.7 3.5 - 5.1 mmol/L Chloride 111 (H) 97 - 108 mmol/L  
 CO2 27 21 - 32 mmol/L Anion gap 7 5 - 15 mmol/L Glucose 99 65 - 100 mg/dL BUN 30 (H) 6 - 20 MG/DL Creatinine 2.01 (H) 0.70 - 1.30 MG/DL  
 BUN/Creatinine ratio 15 12 - 20 GFR est AA 40 (L) >60 ml/min/1.73m2 GFR est non-AA 33 (L) >60 ml/min/1.73m2 Calcium 8.5 8.5 - 10.1 MG/DL Phosphorus 3.1 2.6 - 4.7 MG/DL Albumin 1.5 (L) 3.5 - 5.0 g/dL CBC WITH AUTOMATED DIFF Collection Time: 01/01/19  1:02 AM  
Result Value Ref Range WBC 9.7 4.1 - 11.1 K/uL  
 RBC 2.82 (L) 4.10 - 5.70 M/uL HGB 8.2 (L) 12.1 - 17.0 g/dL HCT 26.0 (L) 36.6 - 50.3 % MCV 92.2 80.0 - 99.0 FL  
 MCH 29.1 26.0 - 34.0 PG  
 MCHC 31.5 30.0 - 36.5 g/dL  
 RDW 14.6 (H) 11.5 - 14.5 % PLATELET 795 661 - 856 K/uL MPV 12.1 8.9 - 12.9 FL  
 NRBC 0.0 0  WBC ABSOLUTE NRBC 0.00 0.00 - 0.01 K/uL NEUTROPHILS 74 32 - 75 % BAND NEUTROPHILS 1 0 - 6 % LYMPHOCYTES 13 12 - 49 % MONOCYTES 8 5 - 13 % EOSINOPHILS 2 0 - 7 % BASOPHILS 0 0 - 1 % METAMYELOCYTES 2 (H) 0 % IMMATURE GRANULOCYTES 0 %  
 ABS. NEUTROPHILS 7.3 1.8 - 8.0 K/UL  
 ABS. LYMPHOCYTES 1.3 0.8 - 3.5 K/UL  
 ABS. MONOCYTES 0.8 0.0 - 1.0 K/UL  
 ABS. EOSINOPHILS 0.2 0.0 - 0.4 K/UL  
 ABS. BASOPHILS 0.0 0.0 - 0.1 K/UL  
 ABS. IMM. GRANS. 0.0 K/UL  
 DF MANUAL    
 RBC COMMENTS OVALOCYTES    
GLUCOSE, POC Collection Time: 01/01/19  6:40 AM  
Result Value Ref Range Glucose (POC) 113 (H) 65 - 100 mg/dL Performed by Benito Martinez Medications reviewed Current Facility-Administered Medications Medication Dose Route Frequency  acetaminophen (TYLENOL) suppository 650 mg  650 mg Rectal Q6H PRN Or  
 acetaminophen (TYLENOL) tablet 650 mg  650 mg Oral Q6H PRN  
 lacosamide (VIMPAT) 150 mg in 0.9% sodium chloride 100 mL IVPB  150 mg IntraVENous Q12H  valproate (DEPACON) 250 mg in 0.9% sodium chloride 50 mL IVPB  250 mg IntraVENous Q6H  
 dextrose 5% infusion  100 mL/hr IntraVENous CONTINUOUS  
 sodium chloride (NS) flush 5-10 mL  5-10 mL IntraVENous Q8H  
 sodium chloride (NS) flush 5-10 mL  5-10 mL IntraVENous PRN  
 naloxone (NARCAN) injection 0.4 mg  0.4 mg IntraVENous PRN  
 calcium-vitamin D (OS-NITA) 500 mg-200 unit tablet  1 Tab Oral TID WITH MEALS  senna-docusate (PERICOLACE) 8.6-50 mg per tablet 1 Tab  1 Tab Oral BID  polyethylene glycol (MIRALAX) packet 17 g  17 g Oral DAILY  bisacodyl (DULCOLAX) suppository 10 mg  10 mg Rectal DAILY PRN  
 heparin (porcine) injection 5,000 Units  5,000 Units SubCUTAneous Q8H  
 morphine injection 2 mg  2 mg IntraVENous Q4H PRN  
 sodium chloride (NS) flush 5-10 mL  5-10 mL IntraVENous Q8H  
 sodium chloride (NS) flush 5-10 mL  5-10 mL IntraVENous PRN  
 cefTRIAXone (ROCEPHIN) 1 g in 0.9% sodium chloride (MBP/ADV) 50 mL  1 g IntraVENous Q24H  
 HYDROcodone-acetaminophen (NORCO) 5-325 mg per tablet 1 Tab  1 Tab Oral Q6H PRN  
 albuterol (PROVENTIL VENTOLIN) nebulizer solution 2.5 mg  2.5 mg Nebulization Q4H PRN  
 atorvastatin (LIPITOR) tablet 40 mg  40 mg Oral DAILY  donepezil (ARICEPT) tablet 10 mg  10 mg Oral QHS  pantoprazole (PROTONIX) tablet 40 mg  40 mg Oral DAILY  isosorbide mononitrate ER (IMDUR) tablet 30 mg  30 mg Oral DAILY  memantine (NAMENDA) tablet 10 mg  10 mg Oral QHS  metoprolol tartrate (LOPRESSOR) tablet 12.5 mg  12.5 mg Oral BID  
 oxybutynin chloride XL (DITROPAN XL) tablet 10 mg  10 mg Oral DAILY Care Plan discussed with: Patient/Family and Nurse Total time spent with patient and review of records: 30 minutes.  
 
Spencer Garcia MD

## 2019-01-01 NOTE — PROGRESS NOTES
[facility] HIP ARTHROPLASTY DAILY NOTE 
 
POD  3 Days Post-Op s/p Procedure(s): HIP HEMIARTHROPLASTY POSTERIOR LEFT 
 
 ASSESSMENT / PLAN :  
1. Admit to medicine appreciate help in care 2. Pain Control : PO and IV pain meds, continues to have pain that limits some of his PT and mobility but concern for further adjustment with PT. 
3. Wound or incisional issue : Healing incision with small areas of intermittent bloody drainage, leave dressing in place 4. Therapy / Weight Bearing Recommendations : Weight bear as tolerated with use of a walker and two person assist while mobilizing , posterior hip precautions 5. BLE DVTs: IVC filter, post op sq heparin PPX dosing for 5 days then therapeutic anticoagulation 6. UTI: klebsiella, cont ceftriaxone 7. Gypsy: cleared by cards, stable 8. Seizure d/o: neurology has seen and no active seizue activity on EEG, medical management and have signed off. 
9. Dementia/anxiety/depression: cont home meds and cont to monitor 10. Hypernatremia: improved, cont to monitor 11. MIGUEL on CKD: fluids and monitor 12. Disposition : [facility] with daily rehab 13. Follow up: 2 weeks with me call [phone] to schedule. WBAT, posterior hip precautions, keep dressing in place until 7-10 days post op SUBJECTIVE :  
 
GERI overnight. Pain controlled at rest moderate to severe with manipulation. Working with PT and minimal progress. Honey thick and mechanical diet. More alert this AM but reports feeling depressed. OBJECTIVE :  
 
Vitals:  
 01/01/19 1230 01/01/19 1235 01/01/19 1318 01/01/19 1443 BP: 109/68 125/77 123/81 111/74 Pulse: 81  84 85 Resp:   21 20 Temp:   98.4 °F (36.9 °C) 98.8 °F (37.1 °C) SpO2: 97%  98% 97% Weight:      
Height:      
 
 
rousable but not oriented to place or situation 
left exam of the hip reveals that the dressing is clean, dry and intact. The patient has + quadriceps, ankle DF/PF, EHL/FHL Sensation is intact to light touch distally No calf pain. Labs: 
Recent Labs 01/01/19 
0102 HGB 8.2* HCT 26.0*  
  
K 3.7 * CO2 27 BUN 30* CREA 2.01* GLU 99 Patient mobility Gait Base of Support: Widened Ambulation - Level of Assistance: Moderate assistance, Maximum assistance, Assist x2 Distance (ft): 2 Feet (ft) Assistive Device: Gait belt, Walker, rolling Rozetta Rias. [place], [place] 
Cell [phone] Medical Staff : Nadeen Strange Office : [phone]

## 2019-01-01 NOTE — PROGRESS NOTES
Neurology Progress Note Patient ID: 
Melissa Paz [id number] 
14 y.o. 
1945 Chief Complaint: None Subjective:  
Patient with no further episodes of seizure-like activity or confusion. Patient now taking p.o. so will change meds. Family at the bedside reports that patient had several seizures at his last admission at [facility] and does follow with Dr. Cristiana Powers as an outpatient for his dementia and seizures. Objective: All records in [facility] care reviewed and noted ROS: 
Per HPI All other 12 pt ROS negative Meds: 
Current Facility-Administered Medications Medication Dose Route Frequency  acetaminophen (TYLENOL) suppository 650 mg  650 mg Rectal Q6H PRN Or  
 acetaminophen (TYLENOL) tablet 650 mg  650 mg Oral Q6H PRN  
 lacosamide (VIMPAT) 150 mg in 0.9% sodium chloride 100 mL IVPB  150 mg IntraVENous Q12H  
 valproate (DEPACON) 250 mg in 0.9% sodium chloride 50 mL IVPB  250 mg IntraVENous Q6H  
 dextrose 5% infusion  50 mL/hr IntraVENous CONTINUOUS  
 sodium chloride (NS) flush 5-10 mL  5-10 mL IntraVENous Q8H  
 sodium chloride (NS) flush 5-10 mL  5-10 mL IntraVENous PRN  
 naloxone (NARCAN) injection 0.4 mg  0.4 mg IntraVENous PRN  
 calcium-vitamin D (OS-NITA) 500 mg-200 unit tablet  1 Tab Oral TID WITH MEALS  senna-docusate (PERICOLACE) 8.6-50 mg per tablet 1 Tab  1 Tab Oral BID  polyethylene glycol (MIRALAX) packet 17 g  17 g Oral DAILY  bisacodyl (DULCOLAX) suppository 10 mg  10 mg Rectal DAILY PRN  
 heparin (porcine) injection 5,000 Units  5,000 Units SubCUTAneous Q8H  
 morphine injection 2 mg  2 mg IntraVENous Q4H PRN  
 sodium chloride (NS) flush 5-10 mL  5-10 mL IntraVENous Q8H  
 sodium chloride (NS) flush 5-10 mL  5-10 mL IntraVENous PRN  
 HYDROcodone-acetaminophen (NORCO) 5-325 mg per tablet 1 Tab  1 Tab Oral Q6H PRN  
 albuterol (PROVENTIL VENTOLIN) nebulizer solution 2.5 mg  2.5 mg Nebulization Q4H PRN  
  atorvastatin (LIPITOR) tablet 40 mg  40 mg Oral DAILY  donepezil (ARICEPT) tablet 10 mg  10 mg Oral QHS  pantoprazole (PROTONIX) tablet 40 mg  40 mg Oral DAILY  isosorbide mononitrate ER (IMDUR) tablet 30 mg  30 mg Oral DAILY  memantine (NAMENDA) tablet 10 mg  10 mg Oral QHS  metoprolol tartrate (LOPRESSOR) tablet 12.5 mg  12.5 mg Oral BID  
 oxybutynin chloride XL (DITROPAN XL) tablet 10 mg  10 mg Oral DAILY Imaging: CT head: Negative EEG: Normal 
Lab Review Recent Results (from the past 24 hour(s)) GLUCOSE, POC Collection Time: 12/31/18  4:30 PM  
Result Value Ref Range Glucose (POC) 204 (H) 65 - 100 mg/dL Performed by [employer] (PCT) GLUCOSE, POC Collection Time: 12/31/18  8:57 PM  
Result Value Ref Range Glucose (POC) 135 (H) 65 - 100 mg/dL Performed by [employer] (PCT) RENAL FUNCTION PANEL Collection Time: 01/01/19  1:02 AM  
Result Value Ref Range Sodium 145 136 - 145 mmol/L Potassium 3.7 3.5 - 5.1 mmol/L Chloride 111 (H) 97 - 108 mmol/L  
 CO2 27 21 - 32 mmol/L Anion gap 7 5 - 15 mmol/L Glucose 99 65 - 100 mg/dL BUN 30 (H) 6 - 20 MG/DL Creatinine 2.01 (H) 0.70 - 1.30 MG/DL  
 BUN/Creatinine ratio 15 12 - 20 GFR est AA 40 (L) >60 ml/min/1.73m2 GFR est non-AA 33 (L) >60 ml/min/1.73m2 Calcium 8.5 8.5 - 10.1 MG/DL Phosphorus 3.1 2.6 - 4.7 MG/DL Albumin 1.5 (L) 3.5 - 5.0 g/dL CBC WITH AUTOMATED DIFF Collection Time: 01/01/19  1:02 AM  
Result Value Ref Range WBC 9.7 4.1 - 11.1 K/uL  
 RBC 2.82 (L) 4.10 - 5.70 M/uL HGB 8.2 (L) 12.1 - 17.0 g/dL HCT 26.0 (L) 36.6 - 50.3 % MCV 92.2 80.0 - 99.0 FL  
 MCH 29.1 26.0 - 34.0 PG  
 MCHC 31.5 30.0 - 36.5 g/dL  
 RDW 14.6 (H) 11.5 - 14.5 % PLATELET 396 861 - 368 K/uL MPV 12.1 8.9 - 12.9 FL  
 NRBC 0.0 0  WBC ABSOLUTE NRBC 0.00 0.00 - 0.01 K/uL NEUTROPHILS 74 32 - 75 % BAND NEUTROPHILS 1 0 - 6 % LYMPHOCYTES 13 12 - 49 % MONOCYTES 8 5 - 13 % EOSINOPHILS 2 0 - 7 % BASOPHILS 0 0 - 1 % METAMYELOCYTES 2 (H) 0 % IMMATURE GRANULOCYTES 0 %  
 ABS. NEUTROPHILS 7.3 1.8 - 8.0 K/UL  
 ABS. LYMPHOCYTES 1.3 0.8 - 3.5 K/UL  
 ABS. MONOCYTES 0.8 0.0 - 1.0 K/UL  
 ABS. EOSINOPHILS 0.2 0.0 - 0.4 K/UL  
 ABS. BASOPHILS 0.0 0.0 - 0.1 K/UL  
 ABS. IMM. GRANS. 0.0 K/UL  
 DF MANUAL    
 RBC COMMENTS OVALOCYTES    
GLUCOSE, POC Collection Time: 01/01/19  6:40 AM  
Result Value Ref Range Glucose (POC) 113 (H) 65 - 100 mg/dL Performed by Soraya Elias GLUCOSE, POC Collection Time: 01/01/19 11:42 AM  
Result Value Ref Range Glucose (POC) 128 (H) 65 - 100 mg/dL Performed by Kelsie Mckeon   
 
 
Exam: 
Visit Vitals /74 Pulse 85 Temp 98.8 °F (37.1 °C) Resp 20 Ht 6' (1.829 m) Wt 80.1 kg (176 lb 8 oz) SpO2 97% BMI 23.94 kg/m² Gen: Well developed CV: RRR Lungs: non labored breathing Abd: non distending Neuro: Alert but not oriented, no dysarthria or aphasia CN II-XII: PERRL, EOMI, face symmetric, tongue/palate midline Motor: strength moves all 4 extremities equally Sensory: intact to LT Assessment:  
 
Patient Active Problem List  
Diagnosis Code  Trimalleolar fracture of right ankle S82.851A  Hip fracture ([address] S72.009A  Thrombocytopenia ([address] D69.6  Anxiety and depression F41.9, F32.9  BPH (benign prostatic hyperplasia) N40.0  GERD (gastroesophageal reflux disease) K21.9  
 HTN (hypertension) I10  
 Seizure (HCC) R56.9  
 UTI (urinary tract infection) N39.0  Hypernatremia E87.0  Acute on chronic renal failure (HCC) N17.9, N18.9  Closed displaced fracture of left femoral neck ([address] S72.002A  Fall W19. Lacinda Sly  Dementia F03.90  
 T1 vertebral fracture ([address] S22.019A  
 
60-year-old gentleman who presented to the emergency room after a fall and a hip fracture which has subsequently been treated.   He had an episode yesterday concerning for possible seizure when he was found unresponsive. He does have a known history of seizure disorder. He has been requiring pain medication to stay as well as being treated for urinary tract infection, both of which could lower his seizure threshold and potentially could have caused seizure. CT of the head was negative. Neuro exam appears to be nonfocal other than his underlying mental status. EEG was negative for seizure activity. We will continue patient on increased dose of Vimpat and change his medications back to oral dosing. Plan: 1. CT head negative as above. 2.  EEG negative 3. Continue telemetry. 4.  Continue antibiotics for UTI. 5.  Continue Vimpat 150 mg p.o. twice daily 6. Continue Depakene 500 mg p.o. twice daily 7. We will continue Namenda and Aricept 8. Seizure precautions. 9.  Limit pain medications. 10. VTE prophylaxis with heparin. No further neuro workup recommended at this time. Will sign off please call with further questions. Patient to follow-up with his outpatient neurologist within 2 weeks of discharge. Signed: 
Fan Hall MD 
1/1/2019 Over 35 minutes was spent reviewing records, discussing with nursing, obtaining history, examining patient and discussing treatment plans.

## 2019-01-01 NOTE — PROCEDURES
Patient Name: Arsenio Diez  : 1945  Age: 68 y.o. Ordering physician: No ref. provider found  Date of EE2019  EEG procedure number: [id number]  Diagnosis: seizures  Interpreting physician: Chastity Kern MD      ELECTROENCEPHALOGRAM REPORT     PROCEDURE: EEG. CLINICAL INDICATION: The patient is a 68 y.o. male with a history of   possible seizures. EEG to rule out seizures, rule out stroke, rule out   cortical abnormality. EEG CLASSIFICATION: Essentially normal    DESCRIPTION OF THE RECORD:   The background of this recording contains a posteriorly-located occipital alpha rhythm of 9 Hz that attenuates with eye opening. Throughout the recording, there were no clear areas of focal slowing nor spike or spike-and-wave discharges seen. Hyperventilation was not performed. Photic stimulation produced a minimal driving response in the posterior head regions. During the recording the patient did not achieve stage II sleep    INTERPRETATION: This is a normal electroencephalogram showing no clear focal abnormalities or epileptiform activity. A normal EEG doesn't not rule out seizures. Clinical correlation recommended.         Lily Cruz MD  2019  3:18 PM

## 2019-01-01 NOTE — PROGRESS NOTES
Problem: Dysphagia (Adult) Goal: *Acute Goals and Plan of Care (Insert Text) Speech Pathology Initiated 12/31/18 (weekly re-eval due 1-7-18) 1. Patient will participate in [facility] tomorrow, 1/2, with goals and recommendations to follow Speech language pathology dysphagia treatment Patient: Fabio Medina (73 y.o. male) Date: 1/1/2019 Diagnosis: Hip fracture ([address] Hip fracture ([address] Closed displaced fracture of left femoral neck ([address] Procedure(s) (LRB): HIP HEMIARTHROPLASTY POSTERIOR LEFT (Left) 3 Days Post-Op Precautions: aspiration Total hip, Other (comment)(posterior approach), ABD wedge in bed,) ASSESSMENT: 
Patient tolerating mech soft, HTL with careful feeding. Very dry oral mucosa caused some chewing issues. Unable to complete MBS today due to holiday Progression toward goals: 
[]         Improving appropriately and progressing toward goals [x]         Improving slowly and progressing toward goals 
[]         Not making progress toward goals and plan of care will be adjusted PLAN: 
Recommendations and Planned Interventions: 
Continue mech soft, HTL 
MBS tomorrow if patient alert and can tx to [facility] chair Patient continues to benefit from skilled intervention to address the above impairments. Continue treatment per established plan of care. Discharge Recommendations:  [facility] SUBJECTIVE:  
Patient stated I just need to lay here and do nothing to get better. Later \" I was supposesd to have a barium test last week, but instead I went to the ER\" OBJECTIVE:  
Cognitive and Communication Status: 
Neurologic State: Alert, Confused Orientation Level: Oriented to person, Oriented to place, Disoriented to time, Disoriented to situation Cognition: Decreased attention/concentration, Decreased command following, Impaired decision making, Memory loss Perception: Appears intact Perseveration: (perseverates on his boots on his feet) Safety/Judgement: Decreased awareness of need for safety, Decreased awareness of environment, Decreased awareness of need for assistance, Decreased insight into deficits Dysphagia Treatment: 
Oral Assessment: P.O. Trials: 
Patient Position: upright in bed Vocal quality prior to P.O.: No impairment Consistency Presented: Thin liquid;Puree;Mechanical soft How Presented: SLP-fed/presented;Spoon 
  
  
 patient seen at meal on [facility] soft, HTL He was a feeder, although he occasionally gave himself a bite of english muffin or banana with cues. ORAL PHASE:  
Mildly slow response to spoon,cup Had to use spoon for HTL Slow chew. Mouth very dry with poor bolus collection and cohesion. Had to use spoons of HTL to clear oral cavity. ; 
He c/o English muffin stuck on the top of his mouth PHARYNGEAL PHASE:  
He kept his head in hyperextended position when chewing due to fatigue. He could reposition head himself and did. This is an aspiration risk. Mild weakness Occasional throat clearing/hawking NO MBS today due to holiday 
  
  
 his speech varied from uncomprehensible muttering to clear. He was confused. Exercises: 
Laryngeal Exercises: 
  
  
  
  
  
  
  
  
  
  
  
  
  
  
  
  
  
  
  
  
  
  
  
  
  
  
  
  
  
  
  
  
  
  
  
  
  
  
  
  
  
  
  
Pain: 
Pain Scale 1: Numeric (0 - 10) Pain Intensity 1: 0 After treatment:  
[]              Patient left in no apparent distress sitting up in chair 
[x]              Patient left in no apparent distress in bed 
[]              Call bell left within reach [x]              Nursing notified 
[]              Caregiver present 
[]              Bed alarm activated COMMUNICATION/EDUCATION:  
Patient was educated regarding His deficit(s) of dysphagia  as this relates to His diagnosis of hip fx. He demonstrated Guarded understanding as evidenced by dementia. The patients plan of care including recommendations, planned interventions, and recommended diet changes were discussed with: Registered Nurses. []              Posted safety precautions in patient's room. CARMENCITA Duque Time Calculation: 20 mins

## 2019-01-01 NOTE — PROGRESS NOTES
Problem: Mobility Impaired (Adult and Pediatric) Goal: *Acute Goals and Plan of Care (Insert Text) Physical Therapy Goals Initiated 12/30/2018 1. Patient will move from supine to sit and sit to supine  in bed with moderate assistance  within 7 day(s). 2.  Patient will transfer from bed to chair and chair to bed with moderate assistance  using the least restrictive device within 7 day(s). 3.  Patient will perform sit to stand with moderate assistance  within 7 day(s). 4.  Patient will ambulate with moderate assistance  for 5 feet with the least restrictive device within 7 day(s). physical Therapy TREATMENT Patient: Bashir Tay (71 y.o. male) Date: 1/1/2019 Diagnosis: Hip fracture ([address] Hip fracture ([address] Closed displaced fracture of left femoral neck ([address] Procedure(s) (LRB): HIP HEMIARTHROPLASTY POSTERIOR LEFT (Left) 3 Days Post-Op Precautions: Total hip, Other (comment)(posterior approach), ABD wedge in bed,) Chart, physical therapy assessment, plan of care and goals were reviewed. ASSESSMENT:    Pt pleasant and agreeable to treatment, reports pain 8-9/10. Pt is at Mod-Max A 2 for all functional mobility. Once sitting EOB pt reports feeling dizzy, symptoms remained in spite of increase time sitting, VSS. Pt stood at RW EOB and took 3 sidesteps toward [facility]. Pt required constant verbal, tactile and visual cues for sequencing transfers and ambulation of  2 feet with RW.  Pt's wife arrive mid session. Pt returned supine, positioned with Prevalon boots, wedge, bed alarm activated, O2 in place. Progression toward goals: 
[]    Improving appropriately and progressing toward goals [x]    Improving slowly and progressing toward goals 
[]    Not making progress toward goals and plan of care will be adjusted PLAN: 
Patient continues to benefit from skilled intervention to address the above impairments. Continue treatment per established plan of care. Discharge Recommendations:  Rehab Further Equipment Recommendations for Discharge:  TBD SUBJECTIVE:  
Patient stated Why do my legs feel so wobbly.  OBJECTIVE DATA SUMMARY:  
Critical Behavior: 
Neurologic State: Alert, Confused Orientation Level: Oriented to person, Oriented to place, Oriented to situation, Oriented to time, Oriented X4 Cognition: Decreased attention/concentration, Decreased command following, Poor safety awareness Safety/Judgement: Decreased awareness of need for safety, Decreased awareness of environment, Decreased awareness of need for assistance, Decreased insight into deficits Functional Mobility Training: 
Bed Mobility: 
  
Supine to Sit: Moderate assistance;Assist x2 Sit to Supine: Maximum assistance;Assist x2 Transfers: 
Sit to Stand: Moderate assistance;Assist x2 Stand to Sit: Minimum assistance;Assist x2 Balance: 
Sitting: With support;High guard; Impaired Sitting - Static: Fair (occasional) Standing: Impaired; With support Standing - Static: Fair Standing - Dynamic : PoorAmbulation/Gait Training: 
Distance (ft): 2 Feet (ft) Assistive Device: Gait belt;Walker, rolling Ambulation - Level of Assistance: Moderate assistance;Maximum assistance;Assist x2 Base of Support: Widened Therapeutic Exercises: Ankle pumps, heel slides and left hip abd/add x 10 reps Pain: 
Pain Scale 1: Numeric (0 - 10) Pain Intensity 1: 0 Activity Tolerance:  
Fair. Please refer to the flowsheet for vital signs taken during this treatment. After treatment:  
[]    Patient left in no apparent distress sitting up in chair 
[x]    Patient left in no apparent distress in bed 
[x]    Call bell left within reach [x]    Nursing notified 
[x]    Caregiver present [x]    Bed alarm activated COMMUNICATION/COLLABORATION:  
The patients plan of care was discussed with: Registered Nurse Yisel Javed PTA Time Calculation: 39 mins

## 2019-01-02 VITALS
WEIGHT: 176.5 LBS | DIASTOLIC BLOOD PRESSURE: 85 MMHG | BODY MASS INDEX: 23.91 KG/M2 | RESPIRATION RATE: 16 BRPM | OXYGEN SATURATION: 96 % | TEMPERATURE: 98.5 F | HEIGHT: 72 IN | SYSTOLIC BLOOD PRESSURE: 125 MMHG | HEART RATE: 92 BPM

## 2019-01-02 LAB
ALBUMIN SERPL-MCNC: 1.6 G/DL (ref 3.5–5)
ANION GAP SERPL CALC-SCNC: 9 MMOL/L (ref 5–15)
BASOPHILS # BLD: 0 K/UL (ref 0–0.1)
BASOPHILS NFR BLD: 0 % (ref 0–1)
BUN SERPL-MCNC: 27 MG/DL (ref 6–20)
BUN/CREAT SERPL: 14 (ref 12–20)
CALCIUM SERPL-MCNC: 8.9 MG/DL (ref 8.5–10.1)
CHLORIDE SERPL-SCNC: 111 MMOL/L (ref 97–108)
CO2 SERPL-SCNC: 28 MMOL/L (ref 21–32)
CREAT SERPL-MCNC: 1.9 MG/DL (ref 0.7–1.3)
DIFFERENTIAL METHOD BLD: ABNORMAL
EOSINOPHIL # BLD: 0.6 K/UL (ref 0–0.4)
EOSINOPHIL NFR BLD: 6 % (ref 0–7)
ERYTHROCYTE [DISTWIDTH] IN BLOOD BY AUTOMATED COUNT: 14.6 % (ref 11.5–14.5)
GLUCOSE SERPL-MCNC: 108 MG/DL (ref 65–100)
HCT VFR BLD AUTO: 27 % (ref 36.6–50.3)
HGB BLD-MCNC: 8.3 G/DL (ref 12.1–17)
IMM GRANULOCYTES # BLD: 0 K/UL
IMM GRANULOCYTES NFR BLD AUTO: 0 %
LYMPHOCYTES # BLD: 1 K/UL (ref 0.8–3.5)
LYMPHOCYTES NFR BLD: 10 % (ref 12–49)
MCH RBC QN AUTO: 28.7 PG (ref 26–34)
MCHC RBC AUTO-ENTMCNC: 30.7 G/DL (ref 30–36.5)
MCV RBC AUTO: 93.4 FL (ref 80–99)
MONOCYTES # BLD: 0.7 K/UL (ref 0–1)
MONOCYTES NFR BLD: 7 % (ref 5–13)
MYELOCYTES NFR BLD MANUAL: 2 %
NEUTS SEG # BLD: 7.1 K/UL (ref 1.8–8)
NEUTS SEG NFR BLD: 75 % (ref 32–75)
NRBC # BLD: 0 K/UL (ref 0–0.01)
NRBC BLD-RTO: 0 PER 100 WBC
PHOSPHATE SERPL-MCNC: 3 MG/DL (ref 2.6–4.7)
PLATELET # BLD AUTO: 270 K/UL (ref 150–400)
PMV BLD AUTO: 11.1 FL (ref 8.9–12.9)
POTASSIUM SERPL-SCNC: 3.6 MMOL/L (ref 3.5–5.1)
RBC # BLD AUTO: 2.89 M/UL (ref 4.1–5.7)
RBC MORPH BLD: ABNORMAL
SODIUM SERPL-SCNC: 148 MMOL/L (ref 136–145)
WBC # BLD AUTO: 9.5 K/UL (ref 4.1–11.1)

## 2019-01-02 PROCEDURE — 97116 GAIT TRAINING THERAPY: CPT

## 2019-01-02 PROCEDURE — 74011250637 HC RX REV CODE- 250/637: Performed by: PSYCHIATRY & NEUROLOGY

## 2019-01-02 PROCEDURE — 74011250637 HC RX REV CODE- 250/637: Performed by: INTERNAL MEDICINE

## 2019-01-02 PROCEDURE — 97530 THERAPEUTIC ACTIVITIES: CPT

## 2019-01-02 PROCEDURE — 77010033678 HC OXYGEN DAILY

## 2019-01-02 PROCEDURE — 74011250637 HC RX REV CODE- 250/637: Performed by: ORTHOPAEDIC SURGERY

## 2019-01-02 PROCEDURE — 74011250637 HC RX REV CODE- 250/637: Performed by: FAMILY MEDICINE

## 2019-01-02 PROCEDURE — 85025 COMPLETE CBC W/AUTO DIFF WBC: CPT

## 2019-01-02 PROCEDURE — 80069 RENAL FUNCTION PANEL: CPT

## 2019-01-02 PROCEDURE — 74011250636 HC RX REV CODE- 250/636: Performed by: ORTHOPAEDIC SURGERY

## 2019-01-02 PROCEDURE — 36415 COLL VENOUS BLD VENIPUNCTURE: CPT

## 2019-01-02 PROCEDURE — 74011000250 HC RX REV CODE- 250: Performed by: ORTHOPAEDIC SURGERY

## 2019-01-02 RX ORDER — FERROUS SULFATE, DRIED 160(50) MG
1 TABLET, EXTENDED RELEASE ORAL
Qty: 60 TAB | Refills: 0 | Status: SHIPPED
Start: 2019-01-02 | End: 2019-01-08

## 2019-01-02 RX ORDER — HYDROCODONE BITARTRATE AND ACETAMINOPHEN 5; 325 MG/1; MG/1
1 TABLET ORAL
Qty: 5 TAB | Refills: 0 | Status: SHIPPED | OUTPATIENT
Start: 2019-01-02

## 2019-01-02 RX ORDER — METOPROLOL TARTRATE 25 MG/1
12.5 TABLET, FILM COATED ORAL 2 TIMES DAILY
Qty: 60 TAB | Refills: 0 | Status: SHIPPED
Start: 2019-01-02

## 2019-01-02 RX ORDER — LACOSAMIDE 150 MG/1
150 TABLET ORAL EVERY 12 HOURS
Qty: 60 TAB | Refills: 0 | Status: SHIPPED
Start: 2019-01-02

## 2019-01-02 RX ORDER — VALPROIC ACID 250 MG/1
500 CAPSULE, LIQUID FILLED ORAL 2 TIMES DAILY
Qty: 60 CAP | Refills: 0 | Status: SHIPPED
Start: 2019-01-02

## 2019-01-02 RX ADMIN — OYSTER SHELL CALCIUM WITH VITAMIN D 1 TABLET: 500; 200 TABLET, FILM COATED ORAL at 09:28

## 2019-01-02 RX ADMIN — HEPARIN SODIUM 5000 UNITS: 5000 INJECTION INTRAVENOUS; SUBCUTANEOUS at 07:08

## 2019-01-02 RX ADMIN — APIXABAN 5 MG: 5 TABLET, FILM COATED ORAL at 12:40

## 2019-01-02 RX ADMIN — OYSTER SHELL CALCIUM WITH VITAMIN D 1 TABLET: 500; 200 TABLET, FILM COATED ORAL at 17:12

## 2019-01-02 RX ADMIN — Medication 5 ML: at 10:42

## 2019-01-02 RX ADMIN — VALPROIC ACID 500 MG: 250 CAPSULE, LIQUID FILLED ORAL at 09:23

## 2019-01-02 RX ADMIN — DOCUSATE SODIUM AND SENNOSIDES 1 TABLET: 8.6; 5 TABLET, FILM COATED ORAL at 17:12

## 2019-01-02 RX ADMIN — ISOSORBIDE MONONITRATE 30 MG: 30 TABLET, EXTENDED RELEASE ORAL at 09:27

## 2019-01-02 RX ADMIN — Medication 10 ML: at 07:08

## 2019-01-02 RX ADMIN — OXYBUTYNIN CHLORIDE 10 MG: 5 TABLET, EXTENDED RELEASE ORAL at 09:28

## 2019-01-02 RX ADMIN — PANTOPRAZOLE SODIUM 40 MG: 40 TABLET, DELAYED RELEASE ORAL at 09:28

## 2019-01-02 RX ADMIN — METOPROLOL TARTRATE 12.5 MG: 25 TABLET ORAL at 17:11

## 2019-01-02 RX ADMIN — LACOSAMIDE 150 MG: 100 TABLET, FILM COATED ORAL at 09:23

## 2019-01-02 RX ADMIN — APIXABAN 5 MG: 5 TABLET, FILM COATED ORAL at 17:13

## 2019-01-02 RX ADMIN — VALPROIC ACID 500 MG: 250 CAPSULE, LIQUID FILLED ORAL at 17:11

## 2019-01-02 RX ADMIN — POLYETHYLENE GLYCOL 3350 17 G: 17 POWDER, FOR SOLUTION ORAL at 09:28

## 2019-01-02 RX ADMIN — OYSTER SHELL CALCIUM WITH VITAMIN D 1 TABLET: 500; 200 TABLET, FILM COATED ORAL at 12:40

## 2019-01-02 RX ADMIN — METOPROLOL TARTRATE 12.5 MG: 25 TABLET ORAL at 09:23

## 2019-01-02 RX ADMIN — ATORVASTATIN CALCIUM 40 MG: 20 TABLET, FILM COATED ORAL at 09:28

## 2019-01-02 RX ADMIN — DOCUSATE SODIUM AND SENNOSIDES 1 TABLET: 8.6; 5 TABLET, FILM COATED ORAL at 09:28

## 2019-01-02 NOTE — PROGRESS NOTES
Bedside, Verbal and Written shift change report given to Chayo Mahmood  (oncoming nurse) by Jes Gutierrez RN  (offgoing nurse). Report included the following information SBAR, Kardex, ED Summary, OR Summary, Procedure Summary, Intake/Output, MAR, Accordion, Recent Results, Med Rec Status, Pre Procedure Checklist and Quality Measures.

## 2019-01-02 NOTE — PROGRESS NOTES
Daily Progress Note and Discharge Note: 1/2/2019 Margarettejosette Jayjay Giang,  Assessment/Plan:  
  Closed displaced fracture of left femoral neck (HCC) (12/27/2018)/ Acute mild T1 compression fracture. s/p posterior L hip hemiarthroplasty 12/29 
- pain management 
-rehab Acute on chronic renal failure ([facility] (12/27/2018). Cr improving 
- monitor. Hypernatremia (12/27/2018). Improved with D5 
-monitor at rehab 
  
  UTI (urinary tract infection) (12/27/2018)(POA). -  tx with ceftriaxone - Urine cx: klebsiella 
  Thrombocytopenia ([facility] (12/27/2018), improved - Monitor.  
  
 Anxiety and depression (12/27/2018). - Continue home meds  
  
 BPH (benign prostatic hyperplasia) (12/27/2018). - Continue home meds  
  
 GERD (gastroesophageal reflux disease) (12/27/2018). - On PPI  
  
 HTN (hypertension) (12/27/2018). - Continue metoprolol and imdur  
  
  Seizure ([facility] (12/27/2018). Likely occurred this admission - On vimpat and valproic acid  
- EEG completed 
-neuro managing  
   
 Fall (12/27/2018). Fall precaution  
  
 Dementia (12/27/2018). - Continue aricept and namenda. - Supportive care  
  
 Protein- calorie malnutrition.  
- Nutrition consult.  
  
 Hypertroponinemia [not clinically relevant], has stabilized. Not MI, likely heart strain. 
- Has been cleared by Cardiology DVT BLE 
- IVC filter placed 12/28 Problem List: 
Problem List as of 1/2/2019 Date Reviewed: 12/27/2018 Codes Class Noted - Resolved Hip fracture ([facility] ICD-10-CM: O90.081Z ICD-9-CM: 820.8  12/27/2018 - Present Thrombocytopenia ([facility] ICD-10-CM: D69.6 ICD-9-CM: 287.5  12/27/2018 - Present Anxiety and depression ICD-10-CM: F41.9, F32.9 ICD-9-CM: 300.00, 311  12/27/2018 - Present BPH (benign prostatic hyperplasia) ICD-10-CM: N40.0 ICD-9-CM: 600.00  12/27/2018 - Present GERD (gastroesophageal reflux disease) ICD-10-CM: K21.9 ICD-9-CM: 530.81  12/27/2018 - Present HTN (hypertension) ICD-10-CM: I10 
ICD-9-CM: 401.9  12/27/2018 - Present Seizure ([facility] ICD-10-CM: R56.9 ICD-9-CM: 780.39  12/27/2018 - Present UTI (urinary tract infection) ICD-10-CM: N39.0 ICD-9-CM: 599.0  12/27/2018 - Present Hypernatremia ICD-10-CM: E87.0 ICD-9-CM: 276.0  12/27/2018 - Present Acute on chronic renal failure (HCC) ICD-10-CM: N17.9, N18.9 ICD-9-CM: 584.9, 585.9  12/27/2018 - Present * (Principal) Closed displaced fracture of left femoral neck ([facility] ICD-10-CM: H65.161X ICD-9-CM: 820.8  12/27/2018 - Present Fall ICD-10-CM: W19. Raymond Chato ICD-9-CM: E888.9  12/27/2018 - Present Dementia ICD-10-CM: F03.90 ICD-9-CM: 294.20  12/27/2018 - Present T1 vertebral fracture ([facility] ICD-10-CM: D65.136Q 
ICD-9-CM: 805.2  12/27/2018 - Present Trimalleolar fracture of right ankle ICD-10-CM: Z49.242V ICD-9-CM: 824.6  10/24/2012 - Present HPI:  
Mr. Magda Xiong is a 68 y.o.  male who is admitted with femoral fracture. Mr. Magda Xiong with PMH of anxiety, depression, GERD, SZD, fall presented to ER after a fall yesterday evening. Pt is in a SNF rehab center since last week, where he was admitted due to fall. Has been falling frequently recently. Yesterday evening, he fell and hit his head and LT hip. This morning, he was found by nursing staff. Pt has dementia and unable to provide Hx.  
 
12/28: Appears comfortable. Confused this am but does point to the left leg when asked about pain. Has been cleared by cardiology for surgery. To have IVC filter placed before surgery. 12/29: IVC filter placed yesterday by IR. Duane Money for L hip ORIF today at 2pm.  Pt feeling ok. No current complaints. NPO this AM. 
 
12/30: post-op day 1. Sodium is improved. Feeling better. Pleasantly confused. Sitting up eating breakfast. Still on voiding trial after santos was removed. :  He reports no pain. He knows he is in hospital but not which one. Events of yesterday reviewed - poss seizure or syncopal episode yesterday and transferred to tele floor. Failed swallowing eval and currently NPO pending Speech Eval.  EEG planned for today. Neuro to see. Hb 8.2 - monitoring. 19: Neuro felt he prob had seizure due to UTI and pain meds. Adjusted AEDs. Speech rec Honey thick liquids with mech soft. Tolerating PO, +BM yesterday. PT working with PT/OT. Pt says he is feeling a little depressed. Wanting to get back to his normal routine. Denies SI. 
 
19:  No further syncope or seizure activity. Not as alert today. Reports he is feeling \"Ok\" with no specific complaints this AM.  Awaiting on [facility] rehab. Start Eliquis for post hip prophylaxis. EEG normal.  
140PM:  Accepted by [facility]. Discussed with ortho and stable for DC to [facility] today. Nursing home MD to monitor Na+ at Rehab. Review of Systems:  
Review of systems not obtained due to patient factors. Objective:  
Physical Exam:  
 
Visit Vitals /87 (BP 1 Location: Right arm, BP Patient Position: At rest) Pulse 83 Temp 97.8 °F (36.6 °C) Resp 17 Ht 6' (1.829 m) Wt 80.1 kg (176 lb 8 oz) SpO2 98% BMI 23.94 kg/m² O2 Flow Rate (L/min): 3 l/min O2 Device: Nasal cannula Temp (24hrs), Av.2 °F (36.8 °C), Min:97.4 °F (36.3 °C), Max:98.8 °F (37.1 °C) No intake/output data recorded.  1901 -  0700 In: 2943.3 [P.O.:150; I.V.:2793.3] Out: 4000 [Urine:2750] General:  Alert, thin frail elderly male in NAD Head:  Normocephalic, without obvious abnormality, atraumatic. Eyes:  Conjunctivae/corneas clear. PERRL, EOMs intact. Throat: Lips, mucosa, and tongue moist. Teeth and gums normal.  
Neck: Dressing over R neck C/D. Supple, symmetrical, trachea midline, no adenopathy, thyroid: no enlargement/tenderness/nodules, no carotid bruit and no JVD. Lungs:   Clear to auscultation bilaterally. Chest wall:  No tenderness or deformity. Heart:  Regular rate and rhythm, S1, S2 normal, no murmur, click, rub or gallop. Abdomen:   Soft, non-tender. Bowel sounds normal. No masses,  No organomegaly. Extremities: LLE with clean/dry dressing. No calf tenderness or cords. Pulses: 2+ and symmetric all extremities. Skin: Skin color, texture, turgor normal. No rashes or lesions Neurologic:  Alert and oriented X 1. Fine motor of hands and fingers normal.   equal.   Sensation grossly normal to touch. Data Review: EEG 1/1/19 INTERPRETATION: This is a normal electroencephalogram showing no clear focal abnormalities or epileptiform activity. A normal EEG doesn't not rule out seizures. Clinical correlation recommended Recent Days: 
Recent Labs 01/02/19 0530 01/01/19 0102 12/31/18 2875 [address] WBC 9.5 9.7 9.7 HGB 8.3* 8.2* 8.6* HCT 27.0* 26.0* 27.3*  
 203 193 Recent Labs 01/02/19 
0530 01/01/19 0102 12/31/18 
0450 12/30/18 
1508 * 145 146* 141  
K 3.6 3.7 3.6 3.7 * 111* 111* 106 CO2 28 27 26 24 * 99 117* 149* BUN 27* 30* 27* 27* CREA 1.90* 2.01* 2.08* 2.37* CA 8.9 8.5 8.6 8.3*  
MG  --   --   --  1.6 PHOS 3.0 3.1 3.1 3.0 ALB 1.6* 1.5* 1.6* 1.8* TBILI  --   --   --  0.3 SGOT  --   --   --  16 ALT  --   --   --  9* No results for input(s): PH, PCO2, PO2, HCO3, FIO2 in the last 72 hours. 24 Hour Results: 
Recent Results (from the past 24 hour(s)) GLUCOSE, POC Collection Time: 01/01/19 11:42 AM  
Result Value Ref Range Glucose (POC) 128 (H) 65 - 100 mg/dL Performed by Martha Lasso   
RENAL FUNCTION PANEL Collection Time: 01/02/19  5:30 AM  
Result Value Ref Range Sodium 148 (H) 136 - 145 mmol/L Potassium 3.6 3.5 - 5.1 mmol/L Chloride 111 (H) 97 - 108 mmol/L  
 CO2 28 21 - 32 mmol/L Anion gap 9 5 - 15 mmol/L Glucose 108 (H) 65 - 100 mg/dL BUN 27 (H) 6 - 20 MG/DL Creatinine 1.90 (H) 0.70 - 1.30 MG/DL  
 BUN/Creatinine ratio 14 12 - 20 GFR est AA 42 (L) >60 ml/min/1.73m2 GFR est non-AA 35 (L) >60 ml/min/1.73m2 Calcium 8.9 8.5 - 10.1 MG/DL Phosphorus 3.0 2.6 - 4.7 MG/DL Albumin 1.6 (L) 3.5 - 5.0 g/dL CBC WITH AUTOMATED DIFF Collection Time: 01/02/19  5:30 AM  
Result Value Ref Range WBC 9.5 4.1 - 11.1 K/uL  
 RBC 2.89 (L) 4.10 - 5.70 M/uL HGB 8.3 (L) 12.1 - 17.0 g/dL HCT 27.0 (L) 36.6 - 50.3 % MCV 93.4 80.0 - 99.0 FL  
 MCH 28.7 26.0 - 34.0 PG  
 MCHC 30.7 30.0 - 36.5 g/dL  
 RDW 14.6 (H) 11.5 - 14.5 % PLATELET 292 782 - 382 K/uL MPV 11.1 8.9 - 12.9 FL  
 NRBC 0.0 0  WBC ABSOLUTE NRBC 0.00 0.00 - 0.01 K/uL NEUTROPHILS 75 32 - 75 % LYMPHOCYTES 10 (L) 12 - 49 % MONOCYTES 7 5 - 13 % EOSINOPHILS 6 0 - 7 % BASOPHILS 0 0 - 1 % MYELOCYTES 2 (H) 0 % IMMATURE GRANULOCYTES 0 %  
 ABS. NEUTROPHILS 7.1 1.8 - 8.0 K/UL  
 ABS. LYMPHOCYTES 1.0 0.8 - 3.5 K/UL  
 ABS. MONOCYTES 0.7 0.0 - 1.0 K/UL  
 ABS. EOSINOPHILS 0.6 (H) 0.0 - 0.4 K/UL  
 ABS. BASOPHILS 0.0 0.0 - 0.1 K/UL  
 ABS. IMM. GRANS. 0.0 K/UL  
 DF MANUAL    
 RBC COMMENTS NORMOCYTIC, NORMOCHROMIC Medications reviewed Current Facility-Administered Medications Medication Dose Route Frequency  lacosamide (VIMPAT) tablet 150 mg  150 mg Oral Q12H  
 valproic acid (DEPAKENE) capsule 500 mg  500 mg Oral BID  acetaminophen (TYLENOL) suppository 650 mg  650 mg Rectal Q6H PRN  Or  
 acetaminophen (TYLENOL) tablet 650 mg  650 mg Oral Q6H PRN  
 dextrose 5% infusion  50 mL/hr IntraVENous CONTINUOUS  
 sodium chloride (NS) flush 5-10 mL  5-10 mL IntraVENous Q8H  
 sodium chloride (NS) flush 5-10 mL  5-10 mL IntraVENous PRN  
 naloxone (NARCAN) injection 0.4 mg  0.4 mg IntraVENous PRN  
 calcium-vitamin D (OS-NITA) 500 mg-200 unit tablet  1 Tab Oral TID WITH MEALS  
  senna-docusate (PERICOLACE) 8.6-50 mg per tablet 1 Tab  1 Tab Oral BID  polyethylene glycol (MIRALAX) packet 17 g  17 g Oral DAILY  bisacodyl (DULCOLAX) suppository 10 mg  10 mg Rectal DAILY PRN  
 heparin (porcine) injection 5,000 Units  5,000 Units SubCUTAneous Q8H  
 morphine injection 2 mg  2 mg IntraVENous Q4H PRN  
 sodium chloride (NS) flush 5-10 mL  5-10 mL IntraVENous Q8H  
 sodium chloride (NS) flush 5-10 mL  5-10 mL IntraVENous PRN  
 HYDROcodone-acetaminophen (NORCO) 5-325 mg per tablet 1 Tab  1 Tab Oral Q6H PRN  
 albuterol (PROVENTIL VENTOLIN) nebulizer solution 2.5 mg  2.5 mg Nebulization Q4H PRN  
 atorvastatin (LIPITOR) tablet 40 mg  40 mg Oral DAILY  donepezil (ARICEPT) tablet 10 mg  10 mg Oral QHS  pantoprazole (PROTONIX) tablet 40 mg  40 mg Oral DAILY  isosorbide mononitrate ER (IMDUR) tablet 30 mg  30 mg Oral DAILY  memantine (NAMENDA) tablet 10 mg  10 mg Oral QHS  metoprolol tartrate (LOPRESSOR) tablet 12.5 mg  12.5 mg Oral BID  
 oxybutynin chloride XL (DITROPAN XL) tablet 10 mg  10 mg Oral DAILY Care Plan discussed with: Patient and Nurse and Ortho Total time spent with patient and review of records: 30 minutes.  
Jose Jha MD

## 2019-01-02 NOTE — PROGRESS NOTES
Occupational Therapy Note: Pts family member requesting pt given time to rest. Will attempt OT later as able.

## 2019-01-02 NOTE — PROGRESS NOTES
1/2/2019 2:05 PM Discharge clinicals including hard script sent to [facility] via [employer] [facility]. Ambulance packet complete on pt's hard chart. 1/2/2019 1:24 PM Spoke with [facility] in admissions at [facility] they can accept pt today. Ambulance transport arranged for 5PM to [place] [address]. Nursing please call report to [phone]. CM called and notified pt's wife, Alvarez Kam of discharge and transport time, she was understanding and agreeable. 1/2/2019 8:49 AM Spoke with pt's attending, pt is ready for discharge today. Updates sent to [facility]. Called and lvm with admissions at [facility]. CM will follow. SHEKHAR Boyce

## 2019-01-02 NOTE — PROGRESS NOTES
[facility] HIP ARTHROPLASTY DAILY NOTE 
 
POD  4 Days Post-Op s/p Procedure(s): HIP HEMIARTHROPLASTY POSTERIOR LEFT 
 
 ASSESSMENT / PLAN :  
1. Admit to medicine appreciate help in care 2. Pain Control : PO and IV pain meds, continues to have pain that limits some of his PT and mobility but concern for further adjustment with PT. 
3. Wound or incisional issue : Healing incision with small areas of intermittent bloody drainage, leave dressing in place 4. Therapy / Weight Bearing Recommendations : Weight bear as tolerated with use of a walker and two person assist while mobilizing , posterior hip precautions 5. BLE DVTs: IVC filter, post op sq heparin PPX dosing for 5 days then therapeutic anticoagulation 6. UTI: klebsiella, cont ceftriaxone 7. Gypsy: cleared by cards, stable 8. Seizure d/o: neurology has seen and no active seizue activity on EEG, medical management and have signed off. 
9. Dementia/anxiety/depression: cont home meds and cont to monitor 10. Hypernatremia: improved, cont to monitor 11. MIGUEL on CKD: fluids and monitor 12. ABLA: hgb stable at 8.3, cont to follow 13. Disposition : [facility] with daily rehab 14. Follow up: 2 weeks with me call [phone] to schedule. WBAT, posterior hip precautions, keep dressing in place until 7-10 days post op SUBJECTIVE :  
 
GERI overnight. Pain controlled at rest moderate to severe with manipulation. Working with PT and minimal progress. Continues to be more alert this AM and appears in better spirits. OBJECTIVE :  
 
Vitals:  
 01/01/19 1443 01/01/19 1957 01/02/19 0134 01/02/19 4774 BP: 111/74 117/73 121/80 145/87 Pulse: 85 88 84 86 Resp: 20 18 16 18 Temp: 98.8 °F (37.1 °C) 98.2 °F (36.8 °C) 98.4 °F (36.9 °C) 98.1 °F (36.7 °C) SpO2: 97% 95% 94% 90% Weight:      
Height:      
 
 
Oriented to hospital not [address], name and situation 
left exam of the hip reveals that the dressing is clean, dry and intact. The patient has + quadriceps, ankle DF/PF, EHL/FHL Sensation is intact to light touch distally No calf pain. Labs: 
Recent Labs 01/02/19 
0530 HGB 8.3* HCT 27.0*  
* K 3.6 * CO2 28 BUN 27* CREA 1.90* * Patient mobility Gait Base of Support: Widened Ambulation - Level of Assistance: Moderate assistance, Maximum assistance, Assist x2 Distance (ft): 2 Feet (ft) Assistive Device: Gait belt, Walker, rolling Jayson Crisp. Eric Muniz, [address] [facility] 
Cell [phone] Medical Staff : Pita Ho Office : [phone]

## 2019-01-02 NOTE — DISCHARGE INSTRUCTIONS
1. Follow up: 2 weeks with me call [phone] to schedule. WBAT, posterior hip precautions, keep dressing in place until 7-10 days post op     Dr. Jian Rod    Patient Discharge Instructions    Nisreen Olivas / [id number] : 1945    Admitted 2018 Discharged: 2019 1:34 PM     ACUTE DIAGNOSES:  Hip fracture ([facility]  Hip fracture ([facility]    CHRONIC MEDICAL DIAGNOSES:  Problem List as of 2019 Date Reviewed: 2018          Codes Class Noted - Resolved    Hip fracture ([facility] ICD-10-CM: Z43.953J  ICD-9-CM: 820.8  2018 - Present        Thrombocytopenia ([facility] ICD-10-CM: D69.6  ICD-9-CM: 287.5  2018 - Present        Anxiety and depression ICD-10-CM: F41.9, F32.9  ICD-9-CM: 300.00, 311  2018 - Present        BPH (benign prostatic hyperplasia) ICD-10-CM: N40.0  ICD-9-CM: 600.00  2018 - Present        GERD (gastroesophageal reflux disease) ICD-10-CM: K21.9  ICD-9-CM: 530.81  2018 - Present        HTN (hypertension) ICD-10-CM: I10  ICD-9-CM: 401.9  2018 - Present        Seizure ([facility] ICD-10-CM: R56.9  ICD-9-CM: 780.39  2018 - Present        UTI (urinary tract infection) ICD-10-CM: N39.0  ICD-9-CM: 599.0  2018 - Present        Hypernatremia ICD-10-CM: E87.0  ICD-9-CM: 276.0  2018 - Present        Acute on chronic renal failure ([facility] ICD-10-CM: N17.9, N18.9  ICD-9-CM: 584.9, 585.9  2018 - Present        * (Principal) Closed displaced fracture of left femoral neck ([facility] ICD-10-CM: J84.553V  ICD-9-CM: 820.8  2018 - Present        Fall ICD-10-CM: W19. Daren Martinez  ICD-9-CM: E888.9  2018 - Present        Dementia ICD-10-CM: F03.90  ICD-9-CM: 294.20  2018 - Present        T1 vertebral fracture ([facility] ICD-10-CM: S22.019A  ICD-9-CM: 805.2  2018 - Present        Trimalleolar fracture of right ankle ICD-10-CM: U99.902A  ICD-9-CM: 824.6  10/24/2012 - Present            DISCHARGE MEDICATIONS:         · It is important that you take the medication exactly as they are prescribed. · Keep your medication in the bottles provided by the pharmacist and keep a list of the medication names, dosages, and times to be taken in your wallet. · Do not take other medications without consulting your doctor. DIET:  Mechanical soft 2 honey    ACTIVITY: Activity as tolerated    ADDITIONAL INFORMATION: If you experience any of the following symptoms then please call your primary care physician or return to the emergency room if you cannot get hold of your doctor: Fever, chills, nausea, vomiting, diarrhea, change in mentation, falling, bleeding, shortness of breath. FOLLOW UP CARE:  Dr. Jose Armando Shay, [address] [place] [address], DO  you are to call and set up an appointment to see them with in 1 week out of rehab. Follow-up with specialists at directed by them      Information obtained by :  I understand that if any problems occur once I am at home I am to contact my physician. I understand and acknowledge receipt of the instructions indicated above.                                                                                                                                            Physician's or R.N.'s Signature                                                                  Date/Time                                                                                                                                              Patient or Representative Signature                                                          Date/Time

## 2019-01-02 NOTE — PROGRESS NOTES
Physical Therapy Two attempts to work with patient this morning, first attempt patient receiving meds,second attempt pt sleeping with one family member requesting to allow patient to rest. PT will follow up later today Elysia Macario

## 2019-01-02 NOTE — PROGRESS NOTES
Problem: Mobility Impaired (Adult and Pediatric) Goal: *Acute Goals and Plan of Care (Insert Text) Physical Therapy Goals Initiated 12/30/2018 1. Patient will move from supine to sit and sit to supine  in bed with moderate assistance  within 7 day(s). 2.  Patient will transfer from bed to chair and chair to bed with moderate assistance  using the least restrictive device within 7 day(s). 3.  Patient will perform sit to stand with moderate assistance  within 7 day(s). 4.  Patient will ambulate with moderate assistance  for 5 feet with the least restrictive device within 7 day(s). physical Therapy TREATMENT Patient: Nicol Lopez (83 y.o. male) Date: 1/2/2019 Diagnosis: Hip fracture ([address] Hip fracture ([address] Closed displaced fracture of left femoral neck ([address] Procedure(s) (LRB): HIP HEMIARTHROPLASTY POSTERIOR LEFT (Left) 4 Days Post-Op Precautions: Total hip, Other (comment)(posterior approach), ABD wedge in bed,) Chart, physical therapy assessment, plan of care and goals were reviewed. ASSESSMENT: 
Pt received in bed,pleasantly confused, oriented to self. Agreeable to participate with physical therapy. Requires verbal cues for sequencing with increased time for processing. Max A x2 for functional transfers and gait training x 3' toward [facility] with tactile cues for wt shifting and to advance LE's. Pt assisted BTB with Max A x2. Progression toward goals: 
[]    Improving appropriately and progressing toward goals [x]    Improving slowly and progressing toward goals 
[]    Not making progress toward goals and plan of care will be adjusted PLAN: 
Patient continues to benefit from skilled intervention to address the above impairments. Continue treatment per established plan of care. Discharge Recommendations:  Robert Kern Further Equipment Recommendations for Discharge:  none SUBJECTIVE:  
Patient stated I am trying to get something going.  OBJECTIVE DATA SUMMARY:  
 Critical Behavior: 
Neurologic State: Confused Orientation Level: Oriented to person, Oriented to situation, Disoriented to situation, Disoriented to place Cognition: Decreased attention/concentration Safety/Judgement: Decreased awareness of need for safety, Decreased awareness of environment, Decreased awareness of need for assistance, Decreased insight into deficits Functional Mobility Training: 
Bed Mobility: 
  
Supine to Sit: Moderate assistance Sit to Supine: Maximum assistance;Assist x2 Transfers: 
Sit to Stand: Maximum assistance;Assist x2 Stand to Sit: Maximum assistance;Assist x2 Balance: 
Sitting: Intact; High guard Sitting - Static: Fair (occasional) Sitting - Dynamic: Fair (occasional) Standing: Impaired;Pull to stand; With support Standing - Static: Fair Standing - Dynamic : PoorAmbulation/Gait Training: 
Distance (ft): 3 Feet (ft) Assistive Device: Walker, rolling;Gait belt Ambulation - Level of Assistance: Maximum assistance;Assist x2 Base of Support: Widened Stairs: 
  
  
   
 
Neuro Re-Education: 
 
Therapeutic Exercises:  
 
Pain: 
  
  
  
  
  
  
Activity Tolerance:  
good Please refer to the flowsheet for vital signs taken during this treatment. After treatment:  
[]    Patient left in no apparent distress sitting up in chair 
[x]    Patient left in no apparent distress in bed 
[x]    Call bell left within reach [x]    Nursing notified 
[]    Caregiver present [x]    Bed alarm activated COMMUNICATION/COLLABORATION:  
The patients plan of care was discussed with: Registered Nurse Fausto Pringle Time Calculation: 30 mins

## 2019-01-02 NOTE — PROGRESS NOTES
Problem: Pressure Injury - Risk of 
Goal: *Prevention of pressure injury Document Bean Scale and appropriate interventions in the flowsheet. Outcome: Progressing Towards Goal 
Pressure Injury Interventions: 
Sensory Interventions: Assess changes in LOC, Assess need for specialty bed, Check visual cues for pain, Discuss PT/OT consult with provider, Maintain/enhance activity level, Pad between skin to skin, Pressure redistribution bed/mattress (bed type), Turn and reposition approx. every two hours (pillows and wedges if needed) Moisture Interventions: Absorbent underpads, Check for incontinence Q2 hours and as needed, Contain wound drainage, Maintain skin hydration (lotion/cream), Minimize layers, Offer toileting Q_hr Activity Interventions: Assess need for specialty bed, Chair cushion, Increase time out of bed, Trapeze to reposition Mobility Interventions: HOB 30 degrees or less, Pressure redistribution bed/mattress (bed type), PT/OT evaluation, Turn and reposition approx. every two hours(pillow and wedges) Nutrition Interventions: Document food/fluid/supplement intake Friction and Shear Interventions: Apply protective barrier, creams and emollients, Feet elevated on foot rest, Lift sheet, Lift team/patient mobility team, Transfer aides:transfer board/[place] lift/ceiling lift, Transferring/repositioning devices Problem: Falls - Risk of 
Goal: *Absence of Falls Document Ebb Jose Alberto Fall Risk and appropriate interventions in the flowsheet. Outcome: Progressing Towards Goal 
Fall Risk Interventions: 
Mobility Interventions: Bed/chair exit alarm, Mechanical lift, Patient to call before getting OOB Mentation Interventions: Bed/chair exit alarm, Evaluate medications/consider consulting pharmacy, Eyeglasses and hearing aids, Gait belt with transfers/ambulation, Increase mobility, Reorient patient, Room close to nurse's station Medication Interventions: Bed/chair exit alarm, Patient to call before getting OOB, Utilize gait belt for transfers/ambulation Elimination Interventions: Bed/chair exit alarm, Call light in reach History of Falls Interventions: Bed/chair exit alarm, Door open when patient unattended, Room close to nurse's station, Utilize gait belt for transfer/ambulation

## 2019-01-02 NOTE — PROGRESS NOTES
Patient's wife in room . No scripts given to wife. Copy of instructions handed to wife . Nurse read and explained medications and instructions. Verbalized understanding

## 2019-01-02 NOTE — DISCHARGE SUMMARY
Physician Discharge Summary     Patient ID:    Soheila Domingo  [id number]  1 y.o.  1945  Ben Jones DO    Admit date: 12/27/2018  Discharge date and time: 1/2/2019  Admission Diagnoses: Hip fracture ([facility] Hip fracture (HCC)  Chronic Diagnoses:    Problem List as of 1/2/2019 Date Reviewed: 12/27/2018          Codes Class Noted - Resolved    Hip fracture ([place] [address] ICD-10-CM: S72.009A  ICD-9-CM: 820.8  12/27/2018 - Present        Thrombocytopenia ([facility] ICD-10-CM: D69.6  ICD-9-CM: 287.5  12/27/2018 - Present        Anxiety and depression ICD-10-CM: F41.9, F32.9  ICD-9-CM: 300.00, 311  12/27/2018 - Present        BPH (benign prostatic hyperplasia) ICD-10-CM: N40.0  ICD-9-CM: 600.00  12/27/2018 - Present        GERD (gastroesophageal reflux disease) ICD-10-CM: K21.9  ICD-9-CM: 530.81  12/27/2018 - Present        HTN (hypertension) ICD-10-CM: I10  ICD-9-CM: 401.9  12/27/2018 - Present        Seizure ([place] [address].) ICD-10-CM: R56.9  ICD-9-CM: 780.39  12/27/2018 - Present        UTI (urinary tract infection) ICD-10-CM: N39.0  ICD-9-CM: 599.0  12/27/2018 - Present        Hypernatremia ICD-10-CM: E87.0  ICD-9-CM: 276.0  12/27/2018 - Present        Acute on chronic renal failure (HCC) ICD-10-CM: N17.9, N18.9  ICD-9-CM: 584.9, 585.9  12/27/2018 - Present        * (Principal) Closed displaced fracture of left femoral neck ([facility] ICD-10-CM: C57.921N  ICD-9-CM: 820.8  12/27/2018 - Present        Fall ICD-10-CM: W19. Abdoulaye Turcios  ICD-9-CM: E888.9  12/27/2018 - Present        Dementia ICD-10-CM: F03.90  ICD-9-CM: 294.20  12/27/2018 - Present        T1 vertebral fracture ([facility] ICD-10-CM: S22.019A  ICD-9-CM: 805.2  12/27/2018 - Present        Trimalleolar fracture of right ankle ICD-10-CM: S82.851A  ICD-9-CM: 824.6  10/24/2012 - Present            Discharge Medications:   Current Discharge Medication List      START taking these medications    Details   calcium-vitamin D (OYSTER SHELL) 500 mg(1,250mg) -200 unit per tablet Take 1 Tab by mouth three (3) times daily (with meals). Qty: 60 Tab, Refills: 0      HYDROcodone-acetaminophen (NORCO) 5-325 mg per tablet Take 1 Tab by mouth every six (6) hours as needed. Max Daily Amount: 4 Tabs. Qty: 5 Tab, Refills: 0    Associated Diagnoses: Closed fracture of left hip, initial encounter ([facility]      apixaban (ELIQUIS) 5 mg tablet Take 1 Tab by mouth two (2) times a day. Qty: 60 Tab, Refills: 0         CONTINUE these medications which have CHANGED    Details   lacosamide (VIMPAT) 150 mg tab tablet Take 1 Tab by mouth every twelve (12) hours. Max Daily Amount: 300 mg. Qty: 60 Tab, Refills: 0    Associated Diagnoses: Seizure ([facility]      metoprolol tartrate (LOPRESSOR) 25 mg tablet Take 0.5 Tabs by mouth two (2) times a day. Qty: 60 Tab, Refills: 0      valproic acid (DEPAKENE) 250 mg capsule Take 2 Caps by mouth two (2) times a day. Qty: 60 Cap, Refills: 0         CONTINUE these medications which have NOT CHANGED    Details   atorvastatin (LIPITOR) 40 mg tablet Take 40 mg by mouth daily. isosorbide mononitrate ER (IMDUR) 30 mg tablet Take 30 mg by mouth daily. aspirin delayed-release 81 mg tablet Take 81 mg by mouth daily. oxybutynin chloride XL (DITROPAN XL) 10 mg CR tablet Take 10 mg by mouth daily. acetaminophen (TYLENOL) 325 mg tablet Take 650 mg by mouth every six (6) hours as needed for Pain. albuterol (PROVENTIL VENTOLIN) 2.5 mg /3 mL (0.083 %) nebulizer solution 2.5 mg by Nebulization route every four (4) hours as needed for Wheezing or Shortness of Breath.      memantine (NAMENDA) 10 mg tablet Take 10 mg by mouth nightly. donepezil (ARICEPT) 10 mg tablet Take 10 mg by mouth nightly. esomeprazole (NEXIUM) 40 mg capsule Take 40 mg by mouth daily. budesonide-formoterol (SYMBICORT) 160-4.5 mcg/actuation HFA inhaler Take 2 Puffs by inhalation two (2) times a day. Follow up Care:    1.  Jacklyn Maurice,  with in 1 weeks out of rehab  2. specialists as directed. Diet: Mechanical soft 2 honey  Disposition:  Rehab. Advanced Directive:  Discharge Exam:  [See today's progress note.]  CONSULTATIONS: Orthopedic Surgery  Significant Diagnostic Studies:   Recent Labs     01/02/19 0530 01/01/19 0102   WBC 9.5 9.7   HGB 8.3* 8.2*   HCT 27.0* 26.0*    203     Recent Labs     01/02/19 0530 01/01/19 0102 12/31/18 0450 12/30/18  1508   * 145 146* 141   K 3.6 3.7 3.6 3.7   * 111* 111* 106   CO2 28 27 26 24   BUN 27* 30* 27* 27*   CREA 1.90* 2.01* 2.08* 2.37*   * 99 117* 149*   CA 8.9 8.5 8.6 8.3*   MG  --   --   --  1.6   PHOS 3.0 3.1 3.1 3.0     Recent Labs     01/02/19 0530 01/01/19 0102 12/31/18 0450 12/30/18  1508   SGOT  --   --   --  16   ALT  --   --   --  9*   AP  --   --   --  55   TBILI  --   --   --  0.3   TP  --   --   --  5.6*   ALB 1.6* 1.5* 1.6* 1.8*   GLOB  --   --   --  3.8     Lab Results   Component Value Date/Time    TSH 0.60 04/20/2009 02:58 PM       HOSPITAL COURSE & TREATMENT RENDERED:   1. See today's progress note:  Daily Progress Note and Discharge Note: 1/2/2019  Jacklyn Flores DO         Assessment/Plan:     Closed displaced fracture of left femoral neck (HCC) (12/27/2018)/ Acute mild T1 compression fracture.  s/p posterior L hip hemiarthroplasty 12/29  - pain management  -rehab      Acute on chronic renal failure ([facility] (12/27/2018). Cr improving  - monitor.        Hypernatremia (12/27/2018). Improved with D5  -monitor at rehab       UTI (urinary tract infection) (12/27/2018)(POA). -  tx with ceftriaxone - Urine cx: klebsiella     Thrombocytopenia ([facility] (12/27/2018), improved  - Monitor.       Anxiety and depression (12/27/2018). - Continue home meds       BPH (benign prostatic hyperplasia) (12/27/2018). - Continue home meds       GERD (gastroesophageal reflux disease) (12/27/2018). - On PPI       HTN (hypertension) (12/27/2018).   - Continue metoprolol and imdur        Seizure ([facility] (12/27/2018). Likely occurred this admission  - On vimpat and valproic acid   - EEG completed  -neuro managing        Fall (12/27/2018). Fall precaution       Dementia (12/27/2018). - Continue aricept and namenda. - Supportive care       Protein- calorie malnutrition.   - Nutrition consult.       Hypertroponinemia [not clinically relevant], has stabilized. Not MI, likely heart strain.  - Has been cleared by Cardiology       DVT BLE  - IVC filter placed 12/28          HPI:   Mr. Landon is a 68 y.o.   male who is admitted with femoral fracture.  Mr. Landon with PMH of anxiety, depression, GERD, SZD, fall presented to ER after a fall yesterday evening. Pt is in a SNF rehab center since last week, where he was admitted due to fall. Has been falling frequently recently. Yesterday evening, he fell and hit his head and LT hip. This morning, he was found by nursing staff. Pt has dementia and unable to provide Hx.      12/28: Appears comfortable. Confused this am but does point to the left leg when asked about pain. Has been cleared by cardiology for surgery. To have IVC filter placed before surgery.      12/29: IVC filter placed yesterday by [facility] for L hip ORIF today at 2pm.  Pt feeling ok. No current complaints. NPO this AM.     12/30: post-op day 1. Sodium is improved. Feeling better. Pleasantly confused. Sitting up eating breakfast. Still on voiding trial after santos was removed.     12/31:  He reports no pain. He knows he is in hospital but not which one. Events of yesterday reviewed - poss seizure or syncopal episode yesterday and transferred to tele floor. Failed swallowing eval and currently NPO pending Speech Eval.  EEG planned for today. Neuro to see. Hb 8.2 - monitoring.      1/1/19: Neuro felt he prob had seizure due to UTI and pain meds. Adjusted AEDs. Speech rec Honey thick liquids with mech soft. Tolerating PO, +BM yesterday. PT working with PT/OT.   Pt says he is feeling a little depressed. Wanting to get back to his normal routine. Denies SI.     19:  No further syncope or seizure activity. Not as alert today. Reports he is feeling \"Ok\" with no specific complaints this AM.  Awaiting on [facility] rehab. Start Eliquis for post hip prophylaxis. EEG normal.   140PM:  Accepted by [facility]. Discussed with ortho and stable for DC to [facility] today. Nursing home MD to monitor Na+ at Rehab.       Review of Systems:   Review of systems not obtained due to patient factors.     Objective:   Physical Exam:   Visit Vitals  /87 (BP 1 Location: Right arm, BP Patient Position: At rest)   Pulse 83   Temp 97.8 °F (36.6 °C)   Resp 17   Ht 6' (1.829 m)   Wt 80.1 kg (176 lb 8 oz)   SpO2 98%   BMI 23.94 kg/m²    O2 Flow Rate (L/min): 3 l/min O2 Device: Nasal cannula  Temp (24hrs), Av.2 °F (36.8 °C), Min:97.4 °F (36.3 °C), Max:98.8 °F (37.1 °C)    No intake/output data recorded.  1901 -  0700  In: 2943.3 [P.O.:150; I.V.:2793.3]  Out: 4000 [Urine:2750]  General:  Alert, thin frail elderly male in NAD   Head:  Normocephalic, without obvious abnormality, atraumatic. Eyes:  Conjunctivae/corneas clear. PERRL, EOMs intact. Throat: Lips, mucosa, and tongue moist. Teeth and gums normal.   Neck: Dressing over R neck C/D. Supple, symmetrical, trachea midline, no adenopathy, thyroid: no enlargement/tenderness/nodules, no carotid bruit and no JVD. Lungs:   Clear to auscultation bilaterally. Chest wall:  No tenderness or deformity. Heart:  Regular rate and rhythm, S1, S2 normal, no murmur, click, rub or gallop. Abdomen:   Soft, non-tender. Bowel sounds normal. No masses,  No organomegaly. Extremities: LLE with clean/dry dressing. No calf tenderness or cords. Pulses: 2+ and symmetric all extremities. Skin: Skin color, texture, turgor normal. No rashes or lesions   Neurologic:  Alert and oriented X 1.   Fine motor of hands and fingers normal.   equal. Sensation grossly normal to touch.         Data Review:     EEG 1/1/19  INTERPRETATION: This is a normal electroencephalogram showing no clear focal abnormalities or epileptiform activity. A normal EEG doesn't not rule out seizures.  Clinical correlation recommended    Signed:  Leonie Russell MD  1/2/2019  10:39 AM

## 2019-01-08 ENCOUNTER — HOSPITAL ENCOUNTER (INPATIENT)
Age: 74
LOS: 3 days | Discharge: NURSING, REHAB OR LONG-TERM CARE | DRG: 177 | End: 2019-01-11
Attending: EMERGENCY MEDICINE | Admitting: INTERNAL MEDICINE
Payer: MEDICARE

## 2019-01-08 ENCOUNTER — APPOINTMENT (OUTPATIENT)
Dept: GENERAL RADIOLOGY | Age: 74
DRG: 177 | End: 2019-01-08
Attending: EMERGENCY MEDICINE
Payer: MEDICARE

## 2019-01-08 DIAGNOSIS — Z71.89 DNR (DO NOT RESUSCITATE) DISCUSSION: ICD-10-CM

## 2019-01-08 DIAGNOSIS — Z71.89 GOALS OF CARE, COUNSELING/DISCUSSION: ICD-10-CM

## 2019-01-08 DIAGNOSIS — J18.9 PNEUMONIA OF LEFT LOWER LOBE DUE TO INFECTIOUS ORGANISM: Primary | ICD-10-CM

## 2019-01-08 DIAGNOSIS — R53.81 DEBILITY: ICD-10-CM

## 2019-01-08 DIAGNOSIS — Z71.89 ADVANCED CARE PLANNING/COUNSELING DISCUSSION: ICD-10-CM

## 2019-01-08 DIAGNOSIS — R41.0 DELIRIUM: ICD-10-CM

## 2019-01-08 LAB
ALBUMIN SERPL-MCNC: 2.2 G/DL (ref 3.5–5)
ALBUMIN/GLOB SERPL: 0.5 {RATIO} (ref 1.1–2.2)
ALP SERPL-CCNC: 93 U/L (ref 45–117)
ALT SERPL-CCNC: 19 U/L (ref 12–78)
ANION GAP SERPL CALC-SCNC: 10 MMOL/L (ref 5–15)
ANION GAP SERPL CALC-SCNC: 7 MMOL/L (ref 5–15)
APPEARANCE UR: CLEAR
AST SERPL-CCNC: 17 U/L (ref 15–37)
ATRIAL RATE: 102 BPM
BACTERIA URNS QL MICRO: NEGATIVE /HPF
BASOPHILS # BLD: 0 K/UL (ref 0–0.1)
BASOPHILS NFR BLD: 0 % (ref 0–1)
BILIRUB SERPL-MCNC: 0.2 MG/DL (ref 0.2–1)
BILIRUB UR QL: NEGATIVE
BUN SERPL-MCNC: 28 MG/DL (ref 6–20)
BUN SERPL-MCNC: 30 MG/DL (ref 6–20)
BUN/CREAT SERPL: 12 (ref 12–20)
BUN/CREAT SERPL: 13 (ref 12–20)
CALCIUM SERPL-MCNC: 7.6 MG/DL (ref 8.5–10.1)
CALCIUM SERPL-MCNC: 9.1 MG/DL (ref 8.5–10.1)
CALCULATED P AXIS, ECG09: 29 DEGREES
CALCULATED R AXIS, ECG10: 45 DEGREES
CALCULATED T AXIS, ECG11: 36 DEGREES
CHLORIDE SERPL-SCNC: 112 MMOL/L (ref 97–108)
CHLORIDE SERPL-SCNC: 116 MMOL/L (ref 97–108)
CO2 SERPL-SCNC: 28 MMOL/L (ref 21–32)
CO2 SERPL-SCNC: 29 MMOL/L (ref 21–32)
COLOR UR: ABNORMAL
CREAT SERPL-MCNC: 2.21 MG/DL (ref 0.7–1.3)
CREAT SERPL-MCNC: 2.51 MG/DL (ref 0.7–1.3)
DIAGNOSIS, 93000: NORMAL
DIFFERENTIAL METHOD BLD: ABNORMAL
EOSINOPHIL # BLD: 0.2 K/UL (ref 0–0.4)
EOSINOPHIL NFR BLD: 2 % (ref 0–7)
EPITH CASTS URNS QL MICRO: ABNORMAL /LPF
ERYTHROCYTE [DISTWIDTH] IN BLOOD BY AUTOMATED COUNT: 16.2 % (ref 11.5–14.5)
GLOBULIN SER CALC-MCNC: 4.2 G/DL (ref 2–4)
GLUCOSE SERPL-MCNC: 102 MG/DL (ref 65–100)
GLUCOSE SERPL-MCNC: 120 MG/DL (ref 65–100)
GLUCOSE UR STRIP.AUTO-MCNC: NEGATIVE MG/DL
HCT VFR BLD AUTO: 28.9 % (ref 36.6–50.3)
HGB BLD-MCNC: 8.8 G/DL (ref 12.1–17)
HGB UR QL STRIP: NEGATIVE
HYALINE CASTS URNS QL MICRO: ABNORMAL /LPF (ref 0–5)
IMM GRANULOCYTES # BLD: 0.2 K/UL (ref 0–0.04)
IMM GRANULOCYTES NFR BLD AUTO: 2 % (ref 0–0.5)
KETONES UR QL STRIP.AUTO: NEGATIVE MG/DL
LACTATE SERPL-SCNC: 1 MMOL/L (ref 0.4–2)
LACTATE SERPL-SCNC: 2.1 MMOL/L (ref 0.4–2)
LEUKOCYTE ESTERASE UR QL STRIP.AUTO: NEGATIVE
LYMPHOCYTES # BLD: 1.1 K/UL (ref 0.8–3.5)
LYMPHOCYTES NFR BLD: 10 % (ref 12–49)
MCH RBC QN AUTO: 29.2 PG (ref 26–34)
MCHC RBC AUTO-ENTMCNC: 30.4 G/DL (ref 30–36.5)
MCV RBC AUTO: 96 FL (ref 80–99)
MONOCYTES # BLD: 1.5 K/UL (ref 0–1)
MONOCYTES NFR BLD: 14 % (ref 5–13)
NEUTS SEG # BLD: 7.7 K/UL (ref 1.8–8)
NEUTS SEG NFR BLD: 72 % (ref 32–75)
NITRITE UR QL STRIP.AUTO: NEGATIVE
NRBC # BLD: 0 K/UL (ref 0–0.01)
NRBC BLD-RTO: 0 PER 100 WBC
P-R INTERVAL, ECG05: 174 MS
PH UR STRIP: 7 [PH] (ref 5–8)
PLATELET # BLD AUTO: 546 K/UL (ref 150–400)
PMV BLD AUTO: 10.7 FL (ref 8.9–12.9)
POTASSIUM SERPL-SCNC: 4.1 MMOL/L (ref 3.5–5.1)
POTASSIUM SERPL-SCNC: 4.7 MMOL/L (ref 3.5–5.1)
PROT SERPL-MCNC: 6.4 G/DL (ref 6.4–8.2)
PROT UR STRIP-MCNC: ABNORMAL MG/DL
Q-T INTERVAL, ECG07: 352 MS
QRS DURATION, ECG06: 112 MS
QTC CALCULATION (BEZET), ECG08: 458 MS
RBC # BLD AUTO: 3.01 M/UL (ref 4.1–5.7)
RBC #/AREA URNS HPF: ABNORMAL /HPF (ref 0–5)
RBC MORPH BLD: ABNORMAL
SODIUM SERPL-SCNC: 151 MMOL/L (ref 136–145)
SODIUM SERPL-SCNC: 151 MMOL/L (ref 136–145)
SP GR UR REFRACTOMETRY: 1.01 (ref 1–1.03)
UR CULT HOLD, URHOLD: NORMAL
UROBILINOGEN UR QL STRIP.AUTO: 0.2 EU/DL (ref 0.2–1)
VENTRICULAR RATE, ECG03: 102 BPM
WBC # BLD AUTO: 10.7 K/UL (ref 4.1–11.1)
WBC URNS QL MICRO: ABNORMAL /HPF (ref 0–4)

## 2019-01-08 PROCEDURE — 93005 ELECTROCARDIOGRAM TRACING: CPT

## 2019-01-08 PROCEDURE — 74011250636 HC RX REV CODE- 250/636: Performed by: EMERGENCY MEDICINE

## 2019-01-08 PROCEDURE — 74011250637 HC RX REV CODE- 250/637: Performed by: INTERNAL MEDICINE

## 2019-01-08 PROCEDURE — 87899 AGENT NOS ASSAY W/OPTIC: CPT

## 2019-01-08 PROCEDURE — 86738 MYCOPLASMA ANTIBODY: CPT

## 2019-01-08 PROCEDURE — 74011250636 HC RX REV CODE- 250/636: Performed by: INTERNAL MEDICINE

## 2019-01-08 PROCEDURE — 36415 COLL VENOUS BLD VENIPUNCTURE: CPT

## 2019-01-08 PROCEDURE — 83605 ASSAY OF LACTIC ACID: CPT

## 2019-01-08 PROCEDURE — 71045 X-RAY EXAM CHEST 1 VIEW: CPT

## 2019-01-08 PROCEDURE — 87040 BLOOD CULTURE FOR BACTERIA: CPT

## 2019-01-08 PROCEDURE — 85025 COMPLETE CBC W/AUTO DIFF WBC: CPT

## 2019-01-08 PROCEDURE — 77030010545

## 2019-01-08 PROCEDURE — 87449 NOS EACH ORGANISM AG IA: CPT

## 2019-01-08 PROCEDURE — 99285 EMERGENCY DEPT VISIT HI MDM: CPT

## 2019-01-08 PROCEDURE — 74011000258 HC RX REV CODE- 258: Performed by: INTERNAL MEDICINE

## 2019-01-08 PROCEDURE — 65270000029 HC RM PRIVATE

## 2019-01-08 PROCEDURE — 80053 COMPREHEN METABOLIC PANEL: CPT

## 2019-01-08 PROCEDURE — 81001 URINALYSIS AUTO W/SCOPE: CPT

## 2019-01-08 PROCEDURE — 74011000258 HC RX REV CODE- 258: Performed by: EMERGENCY MEDICINE

## 2019-01-08 PROCEDURE — 77030011943

## 2019-01-08 PROCEDURE — 96360 HYDRATION IV INFUSION INIT: CPT

## 2019-01-08 RX ORDER — ESOMEPRAZOLE MAGNESIUM 40 MG/1
40 CAPSULE, DELAYED RELEASE ORAL DAILY
Status: DISCONTINUED | OUTPATIENT
Start: 2019-01-09 | End: 2019-01-08 | Stop reason: CLARIF

## 2019-01-08 RX ORDER — MEMANTINE HYDROCHLORIDE 10 MG/1
10 TABLET ORAL
Status: DISCONTINUED | OUTPATIENT
Start: 2019-01-08 | End: 2019-01-11 | Stop reason: HOSPADM

## 2019-01-08 RX ORDER — LACOSAMIDE 100 MG/1
150 TABLET ORAL EVERY 12 HOURS
Status: DISCONTINUED | OUTPATIENT
Start: 2019-01-08 | End: 2019-01-11 | Stop reason: HOSPADM

## 2019-01-08 RX ORDER — OXYBUTYNIN CHLORIDE 5 MG/1
10 TABLET, EXTENDED RELEASE ORAL DAILY
Status: DISCONTINUED | OUTPATIENT
Start: 2019-01-09 | End: 2019-01-11 | Stop reason: HOSPADM

## 2019-01-08 RX ORDER — ACETAMINOPHEN 325 MG/1
650 TABLET ORAL
Status: DISCONTINUED | OUTPATIENT
Start: 2019-01-08 | End: 2019-01-11 | Stop reason: HOSPADM

## 2019-01-08 RX ORDER — CALCIUM CARBONATE/VITAMIN D3 500 MG-10
1 TABLET ORAL 3 TIMES DAILY
COMMUNITY

## 2019-01-08 RX ORDER — DONEPEZIL HYDROCHLORIDE 5 MG/1
10 TABLET, FILM COATED ORAL
Status: DISCONTINUED | OUTPATIENT
Start: 2019-01-08 | End: 2019-01-11 | Stop reason: HOSPADM

## 2019-01-08 RX ORDER — ASPIRIN 81 MG/1
81 TABLET ORAL DAILY
Status: DISCONTINUED | OUTPATIENT
Start: 2019-01-09 | End: 2019-01-11 | Stop reason: HOSPADM

## 2019-01-08 RX ORDER — ATORVASTATIN CALCIUM 20 MG/1
40 TABLET, FILM COATED ORAL
Status: DISCONTINUED | OUTPATIENT
Start: 2019-01-08 | End: 2019-01-11 | Stop reason: HOSPADM

## 2019-01-08 RX ORDER — ONDANSETRON 2 MG/ML
4 INJECTION INTRAMUSCULAR; INTRAVENOUS
Status: DISCONTINUED | OUTPATIENT
Start: 2019-01-08 | End: 2019-01-11 | Stop reason: HOSPADM

## 2019-01-08 RX ORDER — LEVOFLOXACIN 5 MG/ML
750 INJECTION, SOLUTION INTRAVENOUS
Status: DISCONTINUED | OUTPATIENT
Start: 2019-01-08 | End: 2019-01-11

## 2019-01-08 RX ORDER — PANTOPRAZOLE SODIUM 40 MG/1
40 TABLET, DELAYED RELEASE ORAL
Status: DISCONTINUED | OUTPATIENT
Start: 2019-01-09 | End: 2019-01-10

## 2019-01-08 RX ORDER — SODIUM CHLORIDE 0.9 % (FLUSH) 0.9 %
5-40 SYRINGE (ML) INJECTION EVERY 8 HOURS
Status: DISCONTINUED | OUTPATIENT
Start: 2019-01-08 | End: 2019-01-11 | Stop reason: HOSPADM

## 2019-01-08 RX ORDER — ALBUTEROL SULFATE 0.83 MG/ML
2.5 SOLUTION RESPIRATORY (INHALATION)
Status: DISCONTINUED | OUTPATIENT
Start: 2019-01-08 | End: 2019-01-11 | Stop reason: HOSPADM

## 2019-01-08 RX ORDER — BUDESONIDE 0.5 MG/2ML
500 INHALANT ORAL
Status: DISCONTINUED | OUTPATIENT
Start: 2019-01-08 | End: 2019-01-11 | Stop reason: HOSPADM

## 2019-01-08 RX ORDER — SODIUM CHLORIDE 0.9 % (FLUSH) 0.9 %
5-40 SYRINGE (ML) INJECTION AS NEEDED
Status: DISCONTINUED | OUTPATIENT
Start: 2019-01-08 | End: 2019-01-11 | Stop reason: HOSPADM

## 2019-01-08 RX ORDER — ARFORMOTEROL TARTRATE 15 UG/2ML
15 SOLUTION RESPIRATORY (INHALATION)
Status: DISCONTINUED | OUTPATIENT
Start: 2019-01-08 | End: 2019-01-11 | Stop reason: HOSPADM

## 2019-01-08 RX ORDER — DEXTROSE MONOHYDRATE AND SODIUM CHLORIDE 5; .45 G/100ML; G/100ML
100 INJECTION, SOLUTION INTRAVENOUS CONTINUOUS
Status: DISCONTINUED | OUTPATIENT
Start: 2019-01-08 | End: 2019-01-11 | Stop reason: HOSPADM

## 2019-01-08 RX ORDER — VALPROIC ACID 250 MG/1
500 CAPSULE, LIQUID FILLED ORAL 2 TIMES DAILY
Status: DISCONTINUED | OUTPATIENT
Start: 2019-01-08 | End: 2019-01-11 | Stop reason: HOSPADM

## 2019-01-08 RX ADMIN — Medication 10 ML: at 21:09

## 2019-01-08 RX ADMIN — DONEPEZIL HYDROCHLORIDE 10 MG: 5 TABLET, FILM COATED ORAL at 21:55

## 2019-01-08 RX ADMIN — MEMANTINE 10 MG: 10 TABLET ORAL at 21:55

## 2019-01-08 RX ADMIN — SODIUM CHLORIDE 836 ML: 900 INJECTION, SOLUTION INTRAVENOUS at 18:05

## 2019-01-08 RX ADMIN — VANCOMYCIN HYDROCHLORIDE 1000 MG: 1 INJECTION, POWDER, LYOPHILIZED, FOR SOLUTION INTRAVENOUS at 19:13

## 2019-01-08 RX ADMIN — LACOSAMIDE 150 MG: 100 TABLET, FILM COATED ORAL at 21:55

## 2019-01-08 RX ADMIN — LEVOFLOXACIN 750 MG: 5 INJECTION, SOLUTION INTRAVENOUS at 18:34

## 2019-01-08 RX ADMIN — ATORVASTATIN CALCIUM 40 MG: 20 TABLET, FILM COATED ORAL at 21:55

## 2019-01-08 RX ADMIN — APIXABAN 5 MG: 5 TABLET, FILM COATED ORAL at 21:55

## 2019-01-08 RX ADMIN — DEXTROSE MONOHYDRATE AND SODIUM CHLORIDE 100 ML/HR: 5; .45 INJECTION, SOLUTION INTRAVENOUS at 21:09

## 2019-01-08 RX ADMIN — VALPROIC ACID 500 MG: 250 CAPSULE, LIQUID FILLED ORAL at 21:55

## 2019-01-08 RX ADMIN — SODIUM CHLORIDE 1000 ML: 900 INJECTION, SOLUTION INTRAVENOUS at 16:09

## 2019-01-08 RX ADMIN — CEFEPIME HYDROCHLORIDE 2 G: 2 INJECTION, POWDER, FOR SOLUTION INTRAVENOUS at 18:05

## 2019-01-08 NOTE — H&P
Admission History and Physical 
 
 
NAME:  Beverley Rubio :   1945 MRN:  [id number] PCP:  Lexy Bobby DO Date/Time:  2019 Assessment/Plan:   
  
Left lower lobe pneumonia ([facility] [address] (2019):  Prior hx of swallow study with aspiration of liquids. Concern for aspiration pneumonia and HCAP. -- empiric levaquin, cefepime, and vanc 
-- check pneumonia serologies -- unlikely to cooperate with sputum culture Lactic acidemia:  Not c/w severe sepsis as only has tachycardia for SIRS and no other signs of end organ injury (creatinine elevation is mild with baseline CKD). Given 1L IVF. -- additional IVF bolus + continuous -- trend lactate HTN (hypertension) (2018):  BP mildly low in ED. Likely from poor po intake and pneumonia. -- continue IVF 
-- hold metoprolol and imdur Dementia (2018):  Likely has acute exacerbations of confusion due to acute illness and multiple recent hospitalizations. -- frequent orienting 
-- continue namenda and aricept 
 
BPH (benign prostatic hyperplasia) (2018): 
-- may need to consider stopping ditropan if it is thought to be contributing to confusion GERD (gastroesophageal reflux disease) (2018): -- continue PPI Hypernatremia (2018):  Noted on recent admission as well. -- use 1/2 NS IVF Closed displaced fracture of left femoral neck ([facility] [address] (2018): Last admission had surgery. Wound healing. 
-- PT/OT Seizures ([address] ():  Diagnosed on last admission by neurology. -- continue vimpat and valproic acid MIGUEL on CKD 3:  Likely from volume depletion. -- monitor with IVF Subjective: CHIEF COMPLAINT:  AMS. HISTORY OF PRESENT ILLNESS:    
Mr. Jasmina Vu is a 68 y.o.  male who is admitted with Left lower lobe pneumonia ([facility] [address] Mr. Jasmina Vu presented to the Emergency Department today from [facility] due to lethargy.   Patient slid out of chair yesterday without injury. Today staff noted patient to be more lethargic and not acting like himself. Wife is in ED with patient and reports patient to be very fidgety and has been trying to remove his clothing since recent DC to SNF. Has been confused but not recently worse. Has been thickening liquids and there has been concern in the past for aspiration. No recent report of coughing or choking with eating/drinking. Denies sob. No known fever. Denies chest pains. No hx of n/v.   
 
 
Past Medical History:  
Diagnosis Date  Anxiety  BPH (benign prostatic hyperplasia)  Depression  GERD (gastroesophageal reflux disease)  Hypertension  Seizures ([address] Past Surgical History:  
Procedure Laterality Date  HX COLONOSCOPY    
 HX HEENT    
 nasal septum  [place] EGD DELIVER THERMAL ENERGY SPHNCTR/CARDIA GERD Social History Tobacco Use  Smoking status: Never Smoker  Smokeless tobacco: Never Used Substance Use Topics  Alcohol use: No  
  
 
Family History Problem Relation Age of Onset  Cancer Father  Heart Disease Father Allergies Allergen Reactions  Augmentin [Amoxicillin-Pot Clavulanate] Rash Prior to Admission medications Medication Sig Start Date End Date Taking? Authorizing Provider Calcium-Cholecalciferol, D3, 500 mg(1,250mg) -400 unit tab Take 1 Tab by mouth three (3) times daily. Yes Provider, Historical  
lacosamide (VIMPAT) 150 mg tab tablet Take 1 Tab by mouth every twelve (12) hours. Max Daily Amount: 300 mg. 1/2/19  Yes Leonard Goodwin MD  
metoprolol tartrate (LOPRESSOR) 25 mg tablet Take 0.5 Tabs by mouth two (2) times a day. 1/2/19  Yes Leonard Goodwin MD  
valproic acid (DEPAKENE) 250 mg capsule Take 2 Caps by mouth two (2) times a day.  1/2/19  Yes Leonard Goodwin MD  
HYDROcodone-acetaminophen [facility]) 5-325 mg per tablet Take 1 Tab by mouth every six (6) hours as needed. Max Daily Amount: 4 Tabs. 1/2/19  Yes Lucrecia Martínez MD  
apixaban (ELIQUIS) 5 mg tablet Take 1 Tab by mouth two (2) times a day. 1/2/19  Yes Lucrecia Martínez MD  
atorvastatin (LIPITOR) 40 mg tablet Take 40 mg by mouth nightly. Yes Provider, Historical  
isosorbide mononitrate ER (IMDUR) 30 mg tablet Take 30 mg by mouth daily. Yes Provider, Historical  
aspirin delayed-release 81 mg tablet Take 81 mg by mouth daily. Yes Provider, Historical  
oxybutynin chloride XL (DITROPAN XL) 10 mg CR tablet Take 10 mg by mouth daily. Yes Provider, Historical  
acetaminophen (TYLENOL) 325 mg tablet Take 650 mg by mouth every six (6) hours as needed for Pain. Yes Provider, Historical  
albuterol (PROVENTIL VENTOLIN) 2.5 mg /3 mL (0.083 %) nebulizer solution 2.5 mg by Nebulization route every four (4) hours as needed for Wheezing or Shortness of Breath. Yes Provider, Historical  
memantine (NAMENDA) 10 mg tablet Take 10 mg by mouth nightly. Yes Other, MD Natalya  
donepezil (ARICEPT) 10 mg tablet Take 10 mg by mouth nightly. Yes Other, MD Natalya  
esomeprazole (NEXIUM) 40 mg capsule Take 40 mg by mouth daily. Yes Gideon, MD Natalya  
budesonide-formoterol (SYMBICORT) 160-4.5 mcg/actuation HFA inhaler Take 2 Puffs by inhalation two (2) times a day. Yes Other, MD Natalya  
 
 
 
Review of Systems: 
(bold if positive, if negative) Gen:  Eyes:  ENT:  CVS:  Pulm:  GI:   
:   
MS:  PainSkin:  Renal:   
Neuro:    
 
 
  
Objective: VITALS:   
Vital signs reviewed; most recent are: 
 
Visit Vitals BP 97/69 (BP 1 Location: Right arm, BP Patient Position: At rest) Pulse (!) 107 Temp 98.3 °F (36.8 °C) Resp 16 Ht 5' 10\" (1.778 m) Wt 61.2 kg (135 lb) SpO2 96% BMI 19.37 kg/m² SpO2 Readings from Last 6 Encounters:  
01/08/19 96% 01/02/19 96% 12/22/16 99% 08/22/15 95% 10/24/12 98% No intake or output data in the 24 hours ending 01/08/19 5237 Exam:  
 
Physical Exam: 
 
Gen:  thin, in no acute distress HEENT:  Pink conjunctivae, PERRL, hearing intact to voice, moist mucous membranes Resp:  No accessory muscle use, clear breath sounds without wheezes rales or rhonchi 
Card:  No murmurs, normal S1, S2 without thrills or peripheral edema Abd:  Soft, non-tender, non-distended, normoactive bowel sounds are present Musc:  No cyanosis, left hip wound w/o erythema or drainage Skin:  No rashes, skin turgor is good Neuro:  Cranial nerves 3-12 are grossly intact,  strength is 5/5 bilaterally, lifts legs off bed equally, does not follow commands appropriately Psych:  Alert with poor insight. Oriented to person. Labs: 
 
Recent Labs 01/08/19 
1540 WBC 10.7 HGB 8.8* HCT 28.9*  
* Recent Labs 01/08/19 
1540 *  
K 4.7 * CO2 29 * BUN 30* CREA 2.51* CA 9.1 ALB 2.2* TBILI 0.2 SGOT 17 ALT 19 Lab Results Component Value Date/Time Glucose (POC) 128 (H) 01/01/2019 11:42 AM  
 Glucose (POC) 113 (H) 01/01/2019 06:40 AM  
 
No results for input(s): PH, PCO2, PO2, HCO3, FIO2 in the last 72 hours. No results for input(s): INR in the last 72 hours. No lab exists for component: INREXT, INREXT Chest Xray:  Patchy ASDZ LML, LLL. EKG reviewed:  Sinus tachycardia, no acute ischemic st/t wave changes. Medical records reviewed in preparation for this admission: Old medical records. Surrogate decision maker:  spouse Total time spent in care of this patient: 60 Minutes Care Plan discussed with: Patient, Family and Consultant/Specialist 
 
Discussed:  Care Plan Prophylaxis:  apixaban 
 
Probable Disposition:  SNF/LTC 
        
___________________________________________________ Attending Physician: Otilia Woody MD

## 2019-01-08 NOTE — PROGRESS NOTES
[facility]: MED RECONCILIATION Information obtained from: [employer]  Allergies: Augmentin [amoxicillin-pot clavulanate] Prior to Admission Medications:  
 
Medication Documentation Review Audit Reviewed by Sony Carmona PHARMD (Pharmacist) on 01/08/19 at 1661 Medication Sig Documenting Provider Last Dose Status Taking?  
acetaminophen (TYLENOL) 325 mg tablet Take 650 mg by mouth every six (6) hours as needed for Pain. Provider, Historical  Active Yes  
albuterol (PROVENTIL VENTOLIN) 2.5 mg /3 mL (0.083 %) nebulizer solution 2.5 mg by Nebulization route every four (4) hours as needed for Wheezing or Shortness of Breath. Provider, Historical  Active Yes  
apixaban (ELIQUIS) 5 mg tablet Take 1 Tab by mouth two (2) times a day. Sukhdev Chávez MD 1/8/2019 0900 Active Yes  
aspirin delayed-release 81 mg tablet Take 81 mg by mouth daily. Provider, Historical 1/8/2019 Active Yes  
atorvastatin (LIPITOR) 40 mg tablet Take 40 mg by mouth nightly. Provider, Historical 1/7/2019 Active Yes  
budesonide-formoterol (SYMBICORT) 160-4.5 mcg/actuation HFA inhaler Take 2 Puffs by inhalation two (2) times a day. Natalya Meneses MD 1/8/2019 0900 Active Yes Calcium-Cholecalciferol, D3, 500 mg(1,250mg) -400 unit tab Take 1 Tab by mouth three (3) times daily. Provider, Historical 1/8/2019 1130 Active Yes  
donepezil (ARICEPT) 10 mg tablet Take 10 mg by mouth nightly. Natalya Meneses MD 1/7/2019 Active Yes  
esomeprazole (NEXIUM) 40 mg capsule Take 40 mg by mouth daily. Natalya Meneses MD 1/8/2019 Active Yes HYDROcodone-acetaminophen (NORCO) 5-325 mg per tablet Take 1 Tab by mouth every six (6) hours as needed. Max Daily Amount: 4 Tabs. Sukhdev Chávez MD  Active Yes  
isosorbide mononitrate ER (IMDUR) 30 mg tablet Take 30 mg by mouth daily.  Provider, Historical 1/8/2019 Active Yes  
lacosamide (VIMPAT) 150 mg tab tablet Take 1 Tab by mouth every twelve (12) hours. Max Daily Amount: 300 mg. Tad Leyden, MD 1/8/2019 0900 Active Yes  
memantine (NAMENDA) 10 mg tablet Take 10 mg by mouth nightly. Natalya Meneses MD 1/7/2019 Active Yes  
metoprolol tartrate (LOPRESSOR) 25 mg tablet Take 0.5 Tabs by mouth two (2) times a day. Tad Leyden, MD 1/8/2019 0900 Active Yes  
oxybutynin chloride XL (DITROPAN XL) 10 mg CR tablet Take 10 mg by mouth daily. Cr Wilson 1/8/2019 Active Yes  
valproic acid (DEPAKENE) 250 mg capsule Take 2 Caps by mouth two (2) times a day. Tad Leyden, MD 1/8/2019 0800 Active Yes [address] [place], [place]   Contact: [phone]

## 2019-01-08 NOTE — ED PROVIDER NOTES
68 y.o. male with past medical history significant for HTN, seizures, BPH, anxiety, depression, and GERD who presents from [facility] via EMS with chief complaint of confusion. Pt's facility sent him in for evaluation due to 6 days of confusion and concern for dehydration. Pt denies any pain. There are no other acute medical concerns at this time. Full history, physical exam, and ROS unable to be obtained due to:  AMS. Social hx: no tobacco use; no EtOH use PCP: Maryellen Madera DO Note written by Zi Caldwell, as dictated by Drucie Dandy, MD 2:59 PM 
 
 
The history is provided by the patient. No  was used. Past Medical History:  
Diagnosis Date  Anxiety  BPH (benign prostatic hyperplasia)  Depression  GERD (gastroesophageal reflux disease)  Hypertension  Seizures ([address] Past Surgical History:  
Procedure Laterality Date  HX COLONOSCOPY    
 HX HEENT    
 nasal septum  [place] EGD DELIVER THERMAL ENERGY SPHNCTR/CARDIA GERD No family history on file. Social History Socioeconomic History  Marital status:  Spouse name: Not on file  Number of children: Not on file  Years of education: Not on file  Highest education level: Not on file Social Needs  Financial resource strain: Not on file  Food insecurity - worry: Not on file  Food insecurity - inability: Not on file  Transportation needs - medical: Not on file  Transportation needs - non-medical: Not on file Occupational History  Not on file Tobacco Use  Smoking status: Never Smoker  Smokeless tobacco: Never Used Substance and Sexual Activity  Alcohol use: No  
 Drug use: No  
 Sexual activity: Not on file Other Topics Concern  Not on file Social History Narrative  Not on file ALLERGIES: Augmentin [amoxicillin-pot clavulanate] Review of Systems Unable to perform ROS: Mental status change Vitals:  
 01/08/19 1447 BP: 97/69 Pulse: (!) 107 Resp: 16 Temp: 98.3 °F (36.8 °C) SpO2: 96% Weight: 61.2 kg (135 lb) Height: 5' 10\" (1.778 m) Physical Exam  
Constitutional: He appears well-developed. No distress. No distress. HENT:  
Head: Normocephalic and atraumatic. Eyes: Pupils are equal, round, and reactive to light. No scleral icterus. Neck: Normal range of motion. Neck supple. Cardiovascular: Regular rhythm and normal heart sounds. Tachycardia present. Pulmonary/Chest: Effort normal. He has wheezes. Coarse breath sounds. Diffuse scattered wheezes. Abdominal: Soft. He exhibits no distension. There is no tenderness. There is no rebound and no guarding. Musculoskeletal: Normal range of motion. Neurological: He is alert. Alert and oriented to person only. Following commands. Skin: Skin is warm and dry. He is not diaphoretic. Psychiatric: He has a normal mood and affect. His behavior is normal. Thought content normal.  
Nursing note and vitals reviewed. Note written by Zi Mckeon, as dictated by Ronda Strickland MD 2:55 PM 
  
 
MDM Number of Diagnoses or Management Options Delirium:  
Pneumonia of left lower lobe due to infectious organism [facility]):  
Diagnosis management comments: Patient presents with AMS and hypotension found to have PNA. Admitting for abx for HAP. DDX: sepsis, PNA, UTI, delerium, dehydration Procedures ED EKG interpretation: 
Rhythm: sinus tachycardia; and regular . Rate (approx.): 102; Axis: normal; ST/T wave: no ST changes, no T wave changes; no ectopy. Note written by Zi Mckeon, as dictated by Ronda Strickland MD 3:16 PM 
 
CONSULT NOTE: 
4:28 PM Ronda Strickland MD spoke with Dr. Richard Moraes, Consult for Hospitalist.  Discussed available diagnostic tests and clinical findings.   Dr. Richard Moraes will admit the pt.  
 
4:28 PM 
 Patient is being admitted to the hospital.  The results of their tests and reasons for their admission have been discussed with them and/or available family. They convey agreement and understanding for the need to be admitted and for their admission diagnosis. Consultation will be made now with the inpatient physician for hospitalization.

## 2019-01-08 NOTE — PROGRESS NOTES
[facility] Dosing Services: Antimicrobial Stewardship Consult for antibiotic dosing of Cefepime, Levaquin and Vancomycin by Dr. Zara James Indication: HAP Day of Therapy - 1 Ht Readings from Last 1 Encounters:  
01/08/19 177.8 cm (70\") Wt Readings from Last 1 Encounters:  
01/08/19 61.2 kg (135 lb) Vancomycin therapy: 
MIGUEL on CKD 15 mg/kg * 61 kg ~ 1000 mg Loading dose Further dosing based on level Non-Kinetic Antimicrobial Dosing Regimen:  
Cefepime 2 gm IV Q24h Levaquin 750 mg IV Q48h Other Antimicrobial  
(not dosed by pharmacist) Cultures Serum Creatinine Lab Results Component Value Date/Time Creatinine 2.51 (H) 01/08/2019 03:40 PM  
 Creatinine (POC) 1.7 (H) 10/08/2010 07:32 PM  
  
  
Creatinine Clearance Estimated Creatinine Clearance: 22.7 mL/min (A) (based on SCr of 2.51 mg/dL (H)). Temp Temp: 98.3 °F (36.8 °C) WBC Lab Results Component Value Date/Time WBC 10.7 01/08/2019 03:40 PM  
 
  
H/H Lab Results Component Value Date/Time HGB 8.8 (L) 01/08/2019 03:40 PM  
 
  
Platelets Lab Results Component Value Date/Time PLATELET 336 (H) 83/43/1249 03:40 PM  
 
  
 
Pharmacist [address] [place] [address], PHARMD Contact information:[phone]

## 2019-01-08 NOTE — ACP (ADVANCE CARE PLANNING)
Advance Care Planning Note     Name: Arsenio Diez   YOB: 1945   MRN: [id number]   Admission Date: 1/8/2019  2:41 PM     Date of discussion:  1/8/2019         Active Diagnoses:     Principal Problem:    Left lower lobe pneumonia ([facility] [address] (1/8/2019)    Active Problems:    BPH (benign prostatic hyperplasia) (12/27/2018)      GERD (gastroesophageal reflux disease) (12/27/2018)      HTN (hypertension) (12/27/2018)      Hypernatremia (12/27/2018)      Closed displaced fracture of left femoral neck ([facility] [address] (12/27/2018)      Dementia (12/27/2018)      Seizures (HCC) ()           These active diagnoses are of sufficient risk that focused discussion on advance care planning is indicated in order to allow the patient to thoughtfully consider personal goals of care; and, if situations arise that prevent the ability to personally give input, to ensure appropriate representation of their personal desires for different levels and aggressiveness of care. Discussion:     Persons present and participating in discussion: Zaid Dick     Discussion:  Patient has had multiple ED visits and hospital admissions in the past 6 months. Recently admitted for hip fracture. Has had worsening mental status with confusion since that time. Has had decreased appetite and po intake. Has had prior dx of dysphagia and now suspect HCAP vs aspiration or both. Confirmed DNR with wife Keli Lozano. She is already making plans that patient will need long term care. Discussed that it seems that patient is declining with multiple admission and high acuity illnesses. Discussed palliative care given chronic medical issues and decline vs eventual hospice care. Keli Lozano is interested in meeting with palliative care team to discuss further goals of care. Time Spent:     Total time spent face-to-face in education and discussion: 16 minutes.              Pato Lopez MD

## 2019-01-08 NOTE — ED TRIAGE NOTES
Patient arrived from [facility] by ems. State patient has had increased confusion x 1 week. Patient had glf, where he slipped from the wheelchair to the floor yesterday, no injuries from the fall. Doctors at facility are concerned he may have an electrolyte imbalance.

## 2019-01-08 NOTE — PROGRESS NOTES
[facility] Dosing Services: 1/8/19 Pharmacist Renal Dosing Progress Note for Dr Marylou Hernandez The following medication: cefepime was automatically dose-adjusted per [facility] P&T Committee Protocol, with respect to renal function. Pt Weight:  
Wt Readings from Last 1 Encounters:  
01/08/19 61.2 kg (135 lb) Previous Regimen  Cefepime 2gm ivpb q8h Serum Creatinine Lab Results Component Value Date/Time Creatinine 2.51 (H) 01/08/2019 03:40 PM  
 Creatinine (POC) 1.7 (H) 10/08/2010 07:32 PM  
   
Creatinine Clearance Estimated Creatinine Clearance: 22.7 mL/min (A) (based on SCr of 2.51 mg/dL (H)). BUN Lab Results Component Value Date/Time BUN 30 (H) 01/08/2019 03:40 PM  
   
Dosage changed to:  2gm ivpb q24h Pharmacy to continue to monitor patient daily. Will make dosage adjustments based upon changing renal function. Peter Sampson, PHARMD. Contact information:  [phone]

## 2019-01-09 ENCOUNTER — APPOINTMENT (OUTPATIENT)
Dept: GENERAL RADIOLOGY | Age: 74
DRG: 177 | End: 2019-01-09
Attending: FAMILY MEDICINE
Payer: MEDICARE

## 2019-01-09 LAB
ANION GAP SERPL CALC-SCNC: 8 MMOL/L (ref 5–15)
BUN SERPL-MCNC: 26 MG/DL (ref 6–20)
BUN/CREAT SERPL: 12 (ref 12–20)
CALCIUM SERPL-MCNC: 8.8 MG/DL (ref 8.5–10.1)
CHLORIDE SERPL-SCNC: 116 MMOL/L (ref 97–108)
CO2 SERPL-SCNC: 28 MMOL/L (ref 21–32)
CREAT SERPL-MCNC: 2.19 MG/DL (ref 0.7–1.3)
ERYTHROCYTE [DISTWIDTH] IN BLOOD BY AUTOMATED COUNT: 16.2 % (ref 11.5–14.5)
GLUCOSE SERPL-MCNC: 100 MG/DL (ref 65–100)
HCT VFR BLD AUTO: 25.3 % (ref 36.6–50.3)
HGB BLD-MCNC: 7.6 G/DL (ref 12.1–17)
MAGNESIUM SERPL-MCNC: 2.1 MG/DL (ref 1.6–2.4)
MCH RBC QN AUTO: 28.7 PG (ref 26–34)
MCHC RBC AUTO-ENTMCNC: 30 G/DL (ref 30–36.5)
MCV RBC AUTO: 95.5 FL (ref 80–99)
NRBC # BLD: 0 K/UL (ref 0–0.01)
NRBC BLD-RTO: 0 PER 100 WBC
PLATELET # BLD AUTO: 480 K/UL (ref 150–400)
PMV BLD AUTO: 10.7 FL (ref 8.9–12.9)
POTASSIUM SERPL-SCNC: 4 MMOL/L (ref 3.5–5.1)
RBC # BLD AUTO: 2.65 M/UL (ref 4.1–5.7)
SODIUM SERPL-SCNC: 152 MMOL/L (ref 136–145)
WBC # BLD AUTO: 8.7 K/UL (ref 4.1–11.1)

## 2019-01-09 PROCEDURE — 94640 AIRWAY INHALATION TREATMENT: CPT

## 2019-01-09 PROCEDURE — 80048 BASIC METABOLIC PNL TOTAL CA: CPT

## 2019-01-09 PROCEDURE — 83735 ASSAY OF MAGNESIUM: CPT

## 2019-01-09 PROCEDURE — 92610 EVALUATE SWALLOWING FUNCTION: CPT

## 2019-01-09 PROCEDURE — 65270000029 HC RM PRIVATE

## 2019-01-09 PROCEDURE — 74230 X-RAY XM SWLNG FUNCJ C+: CPT

## 2019-01-09 PROCEDURE — 92611 MOTION FLUOROSCOPY/SWALLOW: CPT

## 2019-01-09 PROCEDURE — 74011250636 HC RX REV CODE- 250/636: Performed by: EMERGENCY MEDICINE

## 2019-01-09 PROCEDURE — 97161 PT EVAL LOW COMPLEX 20 MIN: CPT

## 2019-01-09 PROCEDURE — 36415 COLL VENOUS BLD VENIPUNCTURE: CPT

## 2019-01-09 PROCEDURE — 74011000258 HC RX REV CODE- 258: Performed by: INTERNAL MEDICINE

## 2019-01-09 PROCEDURE — 97535 SELF CARE MNGMENT TRAINING: CPT

## 2019-01-09 PROCEDURE — 74011250637 HC RX REV CODE- 250/637: Performed by: INTERNAL MEDICINE

## 2019-01-09 PROCEDURE — 74011000250 HC RX REV CODE- 250: Performed by: INTERNAL MEDICINE

## 2019-01-09 PROCEDURE — 76450000000

## 2019-01-09 PROCEDURE — 85027 COMPLETE CBC AUTOMATED: CPT

## 2019-01-09 PROCEDURE — 74011000258 HC RX REV CODE- 258: Performed by: EMERGENCY MEDICINE

## 2019-01-09 PROCEDURE — 97165 OT EVAL LOW COMPLEX 30 MIN: CPT

## 2019-01-09 RX ADMIN — DONEPEZIL HYDROCHLORIDE 10 MG: 5 TABLET, FILM COATED ORAL at 21:52

## 2019-01-09 RX ADMIN — ARFORMOTEROL TARTRATE 15 MCG: 15 SOLUTION RESPIRATORY (INHALATION) at 08:16

## 2019-01-09 RX ADMIN — MEMANTINE 10 MG: 10 TABLET ORAL at 21:52

## 2019-01-09 RX ADMIN — LACOSAMIDE 150 MG: 100 TABLET, FILM COATED ORAL at 21:52

## 2019-01-09 RX ADMIN — VALPROIC ACID 500 MG: 250 CAPSULE, LIQUID FILLED ORAL at 09:17

## 2019-01-09 RX ADMIN — ASPIRIN 81 MG: 81 TABLET, COATED ORAL at 09:17

## 2019-01-09 RX ADMIN — PANTOPRAZOLE SODIUM 40 MG: 40 TABLET, DELAYED RELEASE ORAL at 09:17

## 2019-01-09 RX ADMIN — Medication 10 ML: at 06:00

## 2019-01-09 RX ADMIN — ARFORMOTEROL TARTRATE 15 MCG: 15 SOLUTION RESPIRATORY (INHALATION) at 20:35

## 2019-01-09 RX ADMIN — VALPROIC ACID 500 MG: 250 CAPSULE, LIQUID FILLED ORAL at 21:52

## 2019-01-09 RX ADMIN — Medication 10 ML: at 21:53

## 2019-01-09 RX ADMIN — APIXABAN 5 MG: 5 TABLET, FILM COATED ORAL at 09:17

## 2019-01-09 RX ADMIN — LACOSAMIDE 150 MG: 100 TABLET, FILM COATED ORAL at 09:17

## 2019-01-09 RX ADMIN — APIXABAN 5 MG: 5 TABLET, FILM COATED ORAL at 21:52

## 2019-01-09 RX ADMIN — ATORVASTATIN CALCIUM 40 MG: 20 TABLET, FILM COATED ORAL at 21:52

## 2019-01-09 RX ADMIN — DEXTROSE MONOHYDRATE AND SODIUM CHLORIDE 100 ML/HR: 5; .45 INJECTION, SOLUTION INTRAVENOUS at 09:28

## 2019-01-09 RX ADMIN — BUDESONIDE 500 MCG: 0.5 INHALANT RESPIRATORY (INHALATION) at 20:35

## 2019-01-09 RX ADMIN — OXYBUTYNIN CHLORIDE 10 MG: 5 TABLET, EXTENDED RELEASE ORAL at 09:17

## 2019-01-09 RX ADMIN — BUDESONIDE 500 MCG: 0.5 INHALANT RESPIRATORY (INHALATION) at 08:16

## 2019-01-09 RX ADMIN — CEFEPIME HYDROCHLORIDE 2 G: 2 INJECTION, POWDER, FOR SOLUTION INTRAVENOUS at 16:39

## 2019-01-09 NOTE — ROUTINE PROCESS
TRANSFER - OUT REPORT: 
 
Verbal report given to Maya HUNTER(name) on Tamar Coyle  being transferred to 4th floor(unit) for routine progression of care Report consisted of patients Situation, Background, Assessment and  
Recommendations(SBAR). Information from the following report(s) ED Summary was reviewed with the receiving nurse. Lines:  
Peripheral IV 01/08/19 Right Antecubital (Active) Site Assessment Clean, dry, & intact 1/8/2019  4:08 PM  
Phlebitis Assessment 0 1/8/2019  4:08 PM  
Infiltration Assessment 0 1/8/2019  4:08 PM  
Dressing Status Clean, dry, & intact 1/8/2019  4:08 PM  
Dressing Type Transparent 1/8/2019  4:08 PM  
Hub Color/Line Status Pink 1/8/2019  4:08 PM  
   
Peripheral IV 01/08/19 Left Forearm (Active) Site Assessment Clean, dry, & intact 1/8/2019  6:04 PM  
Phlebitis Assessment 0 1/8/2019  6:04 PM  
Infiltration Assessment 0 1/8/2019  6:04 PM  
Dressing Status Clean, dry, & intact 1/8/2019  6:04 PM  
Dressing Type Transparent 1/8/2019  6:04 PM  
Hub Color/Line Status Pink 1/8/2019  6:04 PM  
  
 
Opportunity for questions and clarification was provided. Patient transported with: 
 Likva

## 2019-01-09 NOTE — PROGRESS NOTES
Palliative Medicine Social Work Note Attempted supportive visit along with Palliative Medicine Physician Dr. Wandy Andre. No family currently present, pt was asleep in bed; did not disturb at this time. Reviewed chart and discussed with Care Manager, SLP. Palliative team will attempt contact with wife to offer support and review goals of care. Thank you for including Palliative Medicine in the care of Mr. Tye Monte. Hvmacarioeyrarbjesseut  Isidra, CRISTAL, [facility] Center[facility]-SW Palliative Medicine:  [phone])

## 2019-01-09 NOTE — CDMP QUERY
Please clarify if this patient is (was) being treated/managed for:  
 
=> Metabolic encephalopathy POA on top of dementia AEB increased confusion, AMS and lethargy 2/2 asp pna requiring treatment with IV ABX,  frequent orienting, Namenda &  aricept  
=> Dementia POA treated with  frequent orienting, Namenda & aricept  
=> Other explanation of clinical findings  
=> Clinically Undetermined (no explanation for clinical findings) The medical record reflects the following clinical findings, treatment, and risk factors. Risk Factors:  69 yo M admitted with concern for ASP PNA and HCAP Clinical Indicators: ED Note states \" c/o fo confusion for 6 days, lethargy, AMS, Delirium\" H&P states \" dementia,  Likely has acute exacerbations of confusion due to acute illness and multiple recent hospitalizations\" Treatment: IV levaquin, cefepime, and vanc, frequent orienting, Namenda aricept Please clarify and document your clinical opinion in the progress notes and discharge summary including the definitive and/or presumptive diagnosis, (suspected or probable), related to the above clinical findings. Please include clinical findings supporting your diagnosis. Thank you for your time [facility] RN/BSN, CCDS Desk:   [phone] Other:  [phone]

## 2019-01-09 NOTE — PROGRESS NOTES
Problem: Dysphagia (Adult) Goal: *Acute Goals and Plan of Care (Insert Text) 
swalllowing goals initiated 1-9-19: (weekly re-eval 1-16-19) 1)  Tolerate mech soft, honey thick  without increased s/s aspiration by 1--16-19 
2)  MBS to determine least restrictive and safe diet by 1-11-19 -met Speech Pathology Modified barium swallow Study Patient: Tamar Coyle (89 y.o. male) Date: 1/9/2019 Primary Diagnosis: Left lower lobe pneumonia ([address] [J18.1] Precautions: aspiration ASSESSMENT : 
Based on the objective data described below, the patient presents with moderate pharyngeal swallowing issues  That give him both nutrition and hydration issues: He silently aspirated a small amount of solid food, which is an indicator of significant sensory and timing issues in pharynx. He almost aspirated nectars, but was able to throat clear on time in the one sample of nectars. Highly suspect nectars will be silently aspirated on future trials. Swallow weak enough that purees and honey thick were not cleared with initial swallow and he had moderate upper pharyngeal residue after the swallow, with risk to aspirate after the swallow. Patient will benefit from skilled intervention to address the above impairments. Patients rehabilitation potential is considered to be Fair Factors which may influence rehabilitation potential include:  
[]              None noted [x]              Mental ability/status [x]              Medical condition []              Home/family situation and support systems []              Safety awareness []              Pain tolerance/management 
[]              Other: PLAN : 
Recommendations and Planned Interventions: 
Downgrade to dysphagia 1 (purees) and honey thick liquids. Palliative care meeting with patient and family today about goals of care. Frequency/Duration: Patient will be followed by speech-language pathology 2 times a week to address goals. Discharge Recommendations: [facility] SUBJECTIVE:  
Patient stated I guess that explains the cranberry. OBJECTIVE:  
 
Past Medical History:  
Diagnosis Date  Anxiety  BPH (benign prostatic hyperplasia)  Depression  GERD (gastroesophageal reflux disease)  Hypertension  Seizures ([address] Past Surgical History:  
Procedure Laterality Date  HX COLONOSCOPY    
 HX HEENT    
 nasal septum  [place] EGD DELIVER THERMAL ENERGY SPHNCTR/CARDIA GERD Prior Level of Function/Home Situation:  
  
Diet prior to admission: ? 
Current Diet:  [facility] soft, honey thick liquids. Radiologist: Jm Kaba Film Views: Lateral 
Patient Position: upright in Hausted chair Trial 1: Trial 2:  
Consistency Presented: Puree; Honey thick liquid; Nectar thick liquid How Presented: SLP-fed/presented;Spoon;Cup/sip ORAL PHASE:     
Bolus Acceptance: No impairment Bolus Formation/Control: Impaired(mildly reduced chew. swallowed small chunks of solids whole):    :    
Propulsion: No impairment Oral Residue: None PHARYNGEAL PHASE:     
Initiation of Swallow: Triggered at vallecula(when valleculae full with all consistencies). Swallow triggered as material spilled over airway which is an aspiration risk. He also had difficulty initiating swallow with.  piecemeal swallowing of solids. The first swallow was triggered with mild delay, but then no others. Had to use honey thick liquids to trigger swallow Timing: Pooling 1-5 sec;Pooling 6-10 sec(5-6 seconds frequently) for all consistencies. Less with purees, solids. Penetration: Before swallow;During swallow(wiht nectars. with solids). With nectars, he was able to throat clear and clear penetration. Solids were washed into the airway when we used honey to clear solids from posterior tongue base. Aspiration/Timing: Silent ;During(with small solid chunk due to delay and decrease airway coverage) Pharyngeal Clearance: Vallecular residue;10-50%(with solids, purees) Attempted Modifications: Alternate liquids/solids Effective Modifications: None(led to aspiration) Trial 3: Trial 4:  
     
     
     
     
 :    :    
     
     
    
     
     
     
     
     
     
     
     
 
Decreased Tongue Base Retraction?: Yes(moderate) Laryngeal Elevation: Reduced excursion with laryngeal vestibule gap(mild) Aspiration/Penetration Score: 7 (Aspiration-Contrast passes below the cords/, but is not ejected despite attempt) NOMS:  
The NOMS functional outcome measure was used to quantify this patient's level of swallowing impairment. Based on the NOMS, the patient was determined to be at level 3 for swallow function NOMS Swallowing Levels: 
Level 1 (CN): NPO Level 2 (CM): NPO but takes consistency in therapy Level 3 (CL): Takes less than 50% of nutrition p.o. and continues with nonoral feedings; and/or safe with mod cues; and/or max diet restriction Level 4 (CK): Safe swallow but needs mod cues; and/or mod diet restriction; and/or still requires some nonoral feeding/supplements Level 5 (CJ): Safe swallow with min diet restriction; and/or needs min cues Level 6 (CI): Independent with p.o.; rare cues; usually self cues; may need to avoid some foods or needs extra time Level 7 (CH): Independent for all p.o. JOSE J. (2003). National Outcomes Measurement System (NOMS): Adult Speech-Language Pathology User's Guide. COMMUNICATION/EDUCATION:  
Patient was educated regarding His deficit(s) of dysphagia  as this relates to His diagnosis of PNA. He demonstrated Poor - Terminal understanding as evidenced by dementia. . 
 
The patients plan of care including findings from MBS, recommendations, planned interventions, and recommended diet changes were discussed with: . []  Posted safety precautions in patient's room. []  Patient/family have participated as able in goal setting and plan of care. [x]  Patient/family agree to work toward stated goals and plan of care. []  Patient understands intent and goals of therapy, but is neutral about his/her participation. [x]  Patient is unable to participate in goal setting and plan of care. Thank you for this referral. 
Millie Rodríguez SLP Time Calculation: 15 mins

## 2019-01-09 NOTE — PROGRESS NOTES
Problem: Dysphagia (Adult) Goal: *Acute Goals and Plan of Care (Insert Text) 
swalllowing goals initiated 1-9-19: (weekly re-eval 1-16-19) 1)  Tolerate mech soft, nectars without increased s/s aspiration by 1--11-19 
2)  MBS to determine least restrictive and safe diet by 1-11-19 Speech LAnguage Pathology bedside swallow evaluation Patient: Gretchen Issa (15 y.o. male) Date: 1/9/2019 Primary Diagnosis: Left lower lobe pneumonia ([address] [J18.1] Precautions: aspiration ASSESSMENT : 
Based on the objective data described below, the patient presents with at least mild oral-pharyngeal dysphagia . he has moderate weakness and mild delay. Suspect silent aspiration, based on his PNA. HE was admitted with LLL PNA and lactic acidosis, confusion, dehydration. When he left [facility] on 1-2-19, he was on mech soft, honey thick liquids. Uncertain what diet he was on at [facility]. PMH:  HTN, sz, A+D, GERD, BPH, dementia, recent L hip fx, Esophagram 10/2017 showed large hiatal herni and mild reflux and esophageal motility issues. Patient will benefit from skilled intervention to address the above impairments. Patients rehabilitation potential is considered to be Fair Factors which may influence rehabilitation potential include:  
[]            None noted [x]            Mental ability/status [x]            Medical condition []            Home/family situation and support systems 
[]            Safety awareness 
[]            Pain tolerance/management []            Other: PLAN : 
Recommendations and Planned Interventions: 
Restart mech soft, honey thick liquids. Feed only 1/2 tray at a time due to large hiatal hernia, as this is a post prandial aspiration ris. meds in purees. Needs MBS. Frequency/Duration: Patient will be followed by speech-language pathology 3 times a week to address goals. Discharge Recommendations: [facility] SUBJECTIVE:  
 Patient stated I,  My mouth is so dry. OBJECTIVE:  
 
Past Medical History:  
Diagnosis Date  Anxiety  BPH (benign prostatic hyperplasia)  Depression  GERD (gastroesophageal reflux disease)  Hypertension  Seizures ([address] Past Surgical History:  
Procedure Laterality Date  HX COLONOSCOPY    
 HX HEENT    
 nasal septum  [place] EGD DELIVER THERMAL ENERGY SPHNCTR/CARDIA GERD Prior Level of Function/Home Situation: SNF Diet prior to admission: ?>??-information not in hard chart Current Diet:  NPO x meds Cognitive and Communication Status: 
Neurologic State: Alert, Confused Orientation Level: Oriented to person, Disoriented to time, Disoriented to situation, Disoriented to place Cognition: Decreased attention/concentration, Impaired decision making, Impulsive, Memory loss Perception: Appears intact Perseveration: No perseveration noted Safety/Judgement: Lack of insight into deficits Oral Assessment: 
Oral Assessment Labial: Left droop Dentition: Natural;Limited Oral Hygiene: mildly dry Lingual: No impairment Mandible: No impairment P.O. Trials: 
Patient Position: upright in bed Vocal quality prior to P.O.: No impairment Consistency Presented: Nectar thick liquid;Pudding;Puree; Solid How Presented: SLP-fed/presented;Spoon;Cup/sip;Cup/gulp ORAL PHASE:  
Bolus Acceptance: (unable to feed self cognitively) Bolus Formation/Control: No impairment(good chew) Propulsion: No impairment Oral Residue: None PHARYNGEAL PHASE:  
Initiation of Swallow: Delayed (# of seconds) Laryngeal Elevation: Weak(moderate) Aspiration Signs/Symptoms: Change vocal quality;Clear throat(s/p nectars-delayed) Pharyngeal Phase Characteristics: Suspected pharyngeal residue RN reports choking on water this am.  
  
  
  
 
NOMS:  
The NOMS functional outcome measure was used to quantify this patient's level of swallowing impairment.   Based on the NOMS, the patient was determined to be at level 4 for swallow function NOMS Swallowing Levels: 
Level 1 (CN): NPO Level 2 (CM): NPO but takes consistency in therapy Level 3 (CL): Takes less than 50% of nutrition p.o. and continues with nonoral feedings; and/or safe with mod cues; and/or max diet restriction Level 4 (CK): Safe swallow but needs mod cues; and/or mod diet restriction; and/or still requires some nonoral feeding/supplements Level 5 (CJ): Safe swallow with min diet restriction; and/or needs min cues Level 6 (CI): Independent with p.o.; rare cues; usually self cues; may need to avoid some foods or needs extra time Level 7 (CH): Independent for all p.o. JOSE J. (2003). National Outcomes Measurement System (NOMS): Adult Speech-Language Pathology User's Guide. Pain: 
Pain Scale 1: Numeric (0 - 10) Pain Intensity 1: 0 After treatment:  
[]            Patient left in no apparent distress sitting up in chair 
[]            Patient left in no apparent distress in bed 
[]            Call bell left within reach 
[]            Nursing notified 
[]            Caregiver present 
[]            Bed alarm activated COMMUNICATION/EDUCATION:  
The patients plan of care including recommendations, planned interventions, and recommended diet changes were discussed with: Registered Nurse. Patient was educated regarding His deficit(s) of dysphagia  as this relates to His diagnosis of PNA. He demonstrated Guarded understanding as evidenced by confusion. Denita Daily []            Posted safety precautions in patient's room. [x]            Patient/family have participated as able in goal setting and plan of care. [x]            Patient/family agree to work toward stated goals and plan of care. []            Patient understands intent and goals of therapy, but is neutral about his/her participation. []            Patient is unable to participate in goal setting and plan of care.  
 
Thank you for this referral. 
 Nora Needs, SLP Time Calculation: 15 mins

## 2019-01-09 NOTE — PROGRESS NOTES
Spiritual Care Assessment/Progress Note [address] 
 
 
NAME: Shon Kuo      MRN: [id number] AGE: 68 y.o. SEX: male Cheondoism Affiliation: Mandaen Language: English  
 
1/9/2019     Total Time (in minutes): 17 Spiritual Assessment begun in [facility] POST SURG ORT 2 through conversation with: 
  
    [x]Patient        [x] Family    [] Friend(s) Reason for Consult: Initial/Spiritual assessment, patient floor Spiritual beliefs: (Please include comment if needed) [x] Identifies with a valdemar tradition:  Mandaen    
   [] Supported by a valdemar community:        
   [] Claims no spiritual orientation:       
   [] Seeking spiritual identity:            
   [] Adheres to an individual form of spirituality:       
   [] Not able to assess:                   
 
    
Identified resources for coping:  
   [x] Prayer                           
   [] Music                  [] Guided Imagery [x] Family/friends                 [] Pet visits [] Devotional reading                         [] Unknown 
   [] Other:                                          
 
 
Interventions offered during this visit: (See comments for more details) Patient Interventions: Affirmation of emotions/emotional suffering, Coping skills reviewed/reinforced, Iconic (affirming the presence of God/Higher Power) Family/Friend(s): Affirmation of emotions/emotional suffering, Affirmation of valdemar, Catharsis/review of pertinent events in supportive environment, Coping skills reviewed/reinforced, Iconic (affirming the presence of God/Higher Power)(Wife) Plan of Care: 
 
 [x] Support spiritual and/or cultural needs  
 [] Support AMD and/or advance care planning process    
 [] Support grieving process 
 [] Coordinate Rites and/or Rituals  
 [] Coordination with community clergy [] No spiritual needs identified at this time 
 [] Detailed Plan of Care below (See Comments)  [] Make referral to Music Therapy [] Make referral to Pet Therapy    
[] Make referral to Addiction services 
[] Make referral to [facility] 
[] Make referral to Spiritual Care Partner 
[] No future visits requested       
[] Follow up visits as needed Comments: Patient lying in bed resting. Good eye contact, smiling, friendly, good natured. Appears confused, answers some questions appropriately. Wife is also at bedside. Spoke with spouse concerning her present thoughts, feelings, and concerns. She provided brief history concerning the events leading to this hospitalization. Describes active valdemar, identifies with the [address] [place] [address] Road. Good family and Restorationist support. Wife continues to express feelings of grief over loss of her sister on Dec 5, 2018; says she has not had time to process that grief due to her 's health concerns and hospitalizations this past month. Their goal is for the patient to become healthy enough to be discharged home as soon as able. Both appeared comforted and encouraged as a result of this visit and expressed gratitude for this visit. Visited by Rev. Annette Choi, [facility]  paging service: [phone])

## 2019-01-09 NOTE — PROGRESS NOTES
Problem: Falls - Risk of 
Goal: *Absence of Falls Document Nuha Sparkle Fall Risk and appropriate interventions in the flowsheet. Outcome: Progressing Towards Goal 
Fall Risk Interventions: 
Mobility Interventions: Bed/chair exit alarm, OT consult for ADLs, Patient to call before getting OOB, PT Consult for mobility concerns, PT Consult for assist device competence, Utilize walker, cane, or other assistive device, Utilize gait belt for transfers/ambulation Mentation Interventions: Adequate sleep, hydration, pain control, Bed/chair exit alarm, Door open when patient unattended, More frequent rounding, Reorient patient, Toileting rounds, Update white board Medication Interventions: Bed/chair exit alarm, Patient to call before getting OOB, Teach patient to arise slowly Elimination Interventions: Bed/chair exit alarm, Call light in reach, Patient to call for help with toileting needs, Toileting schedule/hourly rounds History of Falls Interventions: Bed/chair exit alarm, Investigate reason for fall, Utilize gait belt for transfer/ambulation, Door open when patient unattended Problem: Pressure Injury - Risk of 
Goal: *Prevention of pressure injury Document Bean Scale and appropriate interventions in the flowsheet. Outcome: Progressing Towards Goal 
Pressure Injury Interventions: 
Sensory Interventions: Assess changes in LOC, Discuss PT/OT consult with provider, Keep linens dry and wrinkle-free, Minimize linen layers, Pressure redistribution bed/mattress (bed type), Turn and reposition approx. every two hours (pillows and wedges if needed) Moisture Interventions: Absorbent underpads, Check for incontinence Q2 hours and as needed, Minimize layers, Offer toileting Q_hr Activity Interventions: Assess need for specialty bed, Pressure redistribution bed/mattress(bed type), Increase time out of bed, PT/OT evaluation Mobility Interventions: Assess need for specialty bed, HOB 30 degrees or less, Pressure redistribution bed/mattress (bed type), PT/OT evaluation Nutrition Interventions: Document food/fluid/supplement intake, Offer support with meals,snacks and hydration Friction and Shear Interventions: HOB 30 degrees or less, Lift team/patient mobility team, Minimize layers

## 2019-01-09 NOTE — PROGRESS NOTES
Nurse unable to complete admission questions at this time. Patient oriented to self. Nurse will need family members to complete questions

## 2019-01-09 NOTE — PROGRESS NOTES
Problem: Self Care Deficits Care Plan (Adult) Goal: *Acute Goals and Plan of Care (Insert Text) Occupational Therapy Goals Initiated 1/9/2019 1. Patient will perform upper body dressing with minimal assistance/contact guard assist within 7 day(s). 2.  Patient will perform grooming, in EOB sitting, with supervision/set-up within 7 day(s). 3.  Patient will perform toilet transfers to [facility], with minimal assistance/contact guard assist within 7 day(s). 4.  Patient will perform all aspects of toileting with moderate assistance  within 7 day(s). Occupational Therapy EVALUATION Patient: Harmony Oconnor (20 y.o. male) Date: 1/9/2019 Primary Diagnosis: Left lower lobe pneumonia ([facility] [address] [J18.1] Precautions: fall, L femur fracture 12/27/18 ASSESSMENT : 
Based on the objective data described below, the patient presents with hospital admission secondary to left lower lob pneumonia. Patient with recent hospital stay secondary to for left femur fracture at the end of December. Patient with history of falls, decreased balance, and decreased safety secondary to impaired cognition. Patient received in R sidelying in bed, sleeping upon OT arrival.  He is able to follow commands for activity with repetition of verbal cues and gestures. Patient with decreased safety awareness, and requires hands on assist at all times due to being impulsive and with decreased functional mobility. Patient able to move to EOB with CGA. Once seated on EOB he required verbal cues for position as patient unaware he is sitting very close to EOB. Patient requires mod assist x 2 for transfer around bed to opposite side prior to return to supine. Patient not safe to be left alone in chair, despite chair alarm secondary to impaired safety and cognition. Patient will benefit from continued OT services. Recommend discharge to SNF. Patient will benefit from skilled intervention to address the above impairments. Patients rehabilitation potential is considered to be Fair Factors which may influence rehabilitation potential include:  
[]             None noted [x]             Mental ability/status []             Medical condition []             Home/family situation and support systems [x]             Safety awareness []             Pain tolerance/management 
[]             Other: PLAN : 
Recommendations and Planned Interventions: 
[x]               Self Care Training                  [x]        Therapeutic Activities [x]               Functional Mobility Training    []        Cognitive Retraining 
[x]               Therapeutic Exercises           [x]        Endurance Activities [x]               Balance Training                   []        Neuromuscular Re-Education []               Visual/Perceptual Training     [x]   Home Safety Training 
[x]               Patient Education                 [x]        Family Training/Education []               Other (comment): Frequency/Duration: Patient will be followed by occupational therapy 3 times a week to address goals. Discharge Recommendations: [facility] Further Equipment Recommendations for Discharge: TBD SUBJECTIVE:  
Patient stated  I need 5 more minutes.  OBJECTIVE DATA SUMMARY:  
HISTORY:  
Past Medical History:  
Diagnosis Date  Anxiety  BPH (benign prostatic hyperplasia)  Depression  GERD (gastroesophageal reflux disease)  Hypertension  Seizures ([address] Past Surgical History:  
Procedure Laterality Date  HX COLONOSCOPY    
 HX HEENT    
 nasal septum  [place] EGD DELIVER THERMAL ENERGY SPHNCTR/CARDIA GERD Prior Level of Function/Environment/Context:  
Occupations in which the patient is/was successful, what are the barriers preventing that success:  
Performance Patterns (routines, roles, habits, and rituals):  
Personal Interests and/or values: Expanded or extensive additional review of patient history:  
 
Home Situation Home Environment: Rehabilitation facility Living Alone: No 
Patient Expects to be Discharged to[de-identified] Rehabilitation facility Tub or Shower Type: Shower Hand dominance: RightEXAMINATION OF PERFORMANCE DEFICITS: 
Cognitive/Behavioral Status: 
Neurologic State: Alert Orientation Level: Oriented to person Cognition: Decreased command following; Impulsive;Memory loss;Poor safety awareness Perception: Appears intact Perseveration: No perseveration noted Safety/Judgement: Lack of insight into deficits; Decreased awareness of environment;Decreased awareness of need for assistance;Decreased awareness of need for safety Skin: intact as seen Edema: none noted Hearing: Auditory Auditory Impairment: Hard of hearing, bilateral, Hearing aid(s) Hearing Aids/Status: Bilateral, At home Vision/Perceptual:   
    
    
    
  
    
    
  
Range of Motion: 
AROM: Within functional limits PROM: Within functional limits Strength: 
Strength: Generally decreased, functional 
  
  
  
  
Coordination: 
Coordination: Generally decreased, functional 
    
  
Tone & Sensation: 
Tone: Normal 
  
  
  
  
  
  
  
Balance: 
  
Functional Mobility and Transfers for ADLs:Bed Mobility: 
Supine to Sit: Contact guard assistance Sit to Supine: Minimum assistance Scooting: Contact guard assistance(verbal cues ) Transfers: 
Sit to Stand: Moderate assistance;Assist x2 Stand to Sit: Moderate assistance;Assist x2 Toilet Transfer : Moderate assistance;Assist x2(BSC - limited distance ) ADL Assessment: 
Feeding: Minimum assistance Oral Facial Hygiene/Grooming: Minimum assistance(able to wash face with verbal cues ) Bathing: Maximum assistance Upper Body Dressing: Moderate assistance Lower Body Dressing: Maximum assistance Toileting: Total assistance ADL Intervention and task modifications: Cognitive Retraining Safety/Judgement: Lack of insight into deficits; Decreased awareness of environment;Decreased awareness of need for assistance;Decreased awareness of need for safety Therapeutic Exercise: 
  
Functional Measure: 
Barthel Index: 
 
Bathin Bladder: 0 Bowels: 5 Groomin Dressin Feedin Mobility: 5 Stairs: 0 Toilet Use: 5 Transfer (Bed to Chair and Back): 5 Total: 25 The Barthel ADL Index: Guidelines 1. The index should be used as a record of what a patient does, not as a record of what a patient could do. 2. The main aim is to establish degree of independence from any help, physical or verbal, however minor and for whatever reason. 3. The need for supervision renders the patient not independent. 4. A patient's performance should be established using the best available evidence. Asking the patient, friends/relatives and nurses are the usual sources, but direct observation and common sense are also important. However direct testing is not needed. 5. Usually the patient's performance over the preceding 24-48 hours is important, but occasionally longer periods will be relevant. 6. Middle categories imply that the patient supplies over 50 per cent of the effort. 7. Use of aids to be independent is allowed. Slime Rosenbaum., Barthel, D.W. (0362). Functional evaluation: the Barthel Index. [address] [facility] (14)2. Deirdre Mooney sumit PEDRO Gonzalez, Annelise Austin., Iraida Gaytan., [place], [address] [place] [address] (). Measuring the change indisability after inpatient rehabilitation; comparison of the responsiveness of the Barthel Index and Functional Stendal Measure. Journal of Neurology, Neurosurgery, and Psychiatry, 66(4), 271-666. Callie Bell N.J.A, CAROLE Gorman, & Yuniel Razo M.A. (2004.) Assessment of post-stroke quality of life in cost-effectiveness studies: The usefulness of the Barthel Index and the EuroQoL-5D. [facility], 13, 135-38 Occupational Therapy Evaluation Charge Determination History Examination Decision-Making LOW Complexity : Brief history review  LOW Complexity : 1-3 performance deficits relating to physical, cognitive , or psychosocial skils that result in activity limitations and / or participation restrictions  LOW Complexity : No comorbidities that affect functional and no verbal or physical assistance needed to complete eval tasks Based on the above components, the patient evaluation is determined to be of the following complexity level: LOW Pain: 
Pain Scale 1: Numeric (0 - 10) Pain Intensity 1: 0 Activity Tolerance: VSS Please refer to the flowsheet for vital signs taken during this treatment. After treatment:  
[] Patient left in no apparent distress sitting up in chair 
[x] Patient left in no apparent distress in bed 
[x] Call bell left within reach [x] Nursing notified 
[x] Caregiver present 
[] Bed alarm activated COMMUNICATION/EDUCATION:  
The patients plan of care was discussed with: Physical Therapist and Registered Nurse. [x] Home safety education was provided and the patient/caregiver indicated understanding. [x] Patient/family have participated as able in goal setting and plan of care. [x] Patient/family agree to work toward stated goals and plan of care. [] Patient understands intent and goals of therapy, but is neutral about his/her participation. [] Patient is unable to participate in goal setting and plan of care. This patients plan of care is appropriate for delegation to \[facility] [place] [facility]\"". Thank you for this referral. 
Adilene Cadena OTR/L Time Calculation: 22 mins

## 2019-01-09 NOTE — CONSULTS
Palliative Medicine Consult    Patient Name: Fela Saini  YOB: 1945    Date of Initial Consult: 1/9/19  Reason for Consult: care decisions  Requesting Provider: Dr. Bryanna Moody   Primary Care Physician: Joe Yeung DO      SUMMARY:   Fela Saini is a 68 y.o. with a past history of dementia, recent hip fracture, HTN, BPH, aspiration, GERD, seizures, who was admitted on 1/8/2019 from [address] [place] [address] with a diagnosis of pneumonia/MIGUEL/hypernatremia. Current medical issues leading to Palliative Medicine involvement include: care decisions. Chart reviewed/HPI-patient admitted to the hospital from [address] [place] [address] with pneumonia->suspected aspiration. Patient just left the hospital 1 week ago after a hip fracture. In discussion with his spouse, he has had significant decline over the last several months with poor po intake, weight loss, issues with aspiration/dysphagia that even goes back to last summer. SH-just moved to the [facility] [place] in the last couple of months to be closer to 2 daughters(Elida Spears and LORNE). Son lives in [facility] off. Spouse admits struggling caring for him and is thinking about long term care placement       PALLIATIVE DIAGNOSES:   1. GOC discussion  2. DNR review  3. ACP review  4. Debility  5. Aspiration pneumonia  6. Dementia-appears to be progressing with poor po intake, weight loss(Albumin of 1.5 on 1/2)        PLAN:   1. Maria Dolores([facility]) and I attempted to meet with patient who is sleeping. 2. Talked with spouse via phone, reviewed our role in her 's care, and then discussed his current medical issues. She admits to feeling overwhelmed by all of this and feels like his decline has been fairly quick. She is struggling on knowing what to do. She thinks long term care may be the next option. For now, she wants to continue his current treatment.  Did explain the results of his swallowing evaluation and the possibility of recurrent aspiration/possible cause of current pneumonia. Briefly discussed \"feeding tube\" and that has not come up in any prior discussions. Explained that patients with dementia typically do not do well with feeding tubes but the inpatient team and our team will assist her in making difficult decisions over the next several days based on how he responds to current tx.   3. GOC-continue current therapy. Our team will continue to talk with spouse over the next several days on long term plans. She is leaning toward long term bed in nursing home  4. AMD-she states has completed and will bring a copy to the hospital  5. Code status-reviewed again with her and she is clear on DNAR/DNI in both arrest and pre-arrest situation. Will assist his spouse in completing a DDNR prior to discharge  6. Symptom management-no acute symptoms for us to manage  7. Psychosocial-good support from family. Spouse feeling a little overwhelmed with everything. No spiritual concerns  8. Discussed with CM(Belen) and bedside nurse  9. Initial consult note routed to primary continuity provider  10. Communicated plan of care with: Palliative IDT       GOALS OF CARE / TREATMENT PREFERENCES:   [====Goals of Care====]  GOALS OF CARE:  Patient/Health Care Proxy Stated Goals: Other (comment)(Full restorative measures for now)      TREATMENT PREFERENCES:   Code Status: DNR    Advance Care Planning:  Advance Care Planning 12/30/2018   Confirm Advance Directive None       Medical Interventions: Limited additional interventions  Other Instructions:           The palliative care team has discussed with patient / health care proxy about goals of care / treatment preferences for patient.  [====Goals of Care====]         HISTORY:     History obtained from: chart, CM(Belen) and spouse(via phone)    CHIEF COMPLAINT: cough    HPI/SUBJECTIVE:    The patient is:   [x] Verbal and participatory  [] Non-participatory due to:   Patient is sleeping at this time so could not have full assessment     Clinical Pain Assessment (nonverbal scale for severity on nonverbal patients):   Clinical Pain Assessment  Severity: 0            FUNCTIONAL ASSESSMENT:     Palliative Performance Scale (PPS):  PPS: 50       PSYCHOSOCIAL/SPIRITUAL SCREENING:     Advance Care Planning:  Advance Care Planning 12/30/2018   Confirm Advance Directive None        Any spiritual / Christianity concerns:  [] Yes /  [x] No    Caregiver Burnout:  [x] Yes /  [] No /  [] No Caregiver Present-spouse at risk      Anticipatory grief assessment:   [x] Normal  / [] Maladaptive       ESAS Anxiety:      ESAS Depression:      could not assess today     REVIEW OF SYSTEMS:     Positive and pertinent negative findings in ROS are noted above in HPI. The following systems were [x] reviewed / [] unable to be reviewed as noted in HPI  Other findings are noted below. Systems: constitutional, ears/nose/mouth/throat, respiratory, gastrointestinal, genitourinary, musculoskeletal, integumentary, neurologic, psychiatric, endocrine. Positive findings noted below. Modified ESAS Completed by: provider   Fatigue: 1 Drowsiness: 1     Pain: 0     Nausea: 0   Anorexia: 0 Dyspnea: 2     Constipation: No              PHYSICAL EXAM:     From RN flowsheet:  Wt Readings from Last 3 Encounters:   01/08/19 135 lb (61.2 kg)   01/01/19 176 lb 8 oz (80.1 kg)   12/22/16 182 lb 15.7 oz (83 kg)     Blood pressure (!) 145/93, pulse 96, temperature 99.4 °F (37.4 °C), resp. rate 14, height 5' 10\" (1.778 m), weight 135 lb (61.2 kg), SpO2 96 %.     Pain Scale 1: Numeric (0 - 10)  Pain Intensity 1: 0                 Last bowel movement, if known:     Constitutional: sleeping, NAD  Eyes: pupils equal, anicteric  ENMT: no nasal discharge, moist mucous membranes  Cardiovascular: regular rhythm, distal pulses intact  Respiratory: breathing not labored, symmetric  Gastrointestinal: soft non-tender, +bowel sounds  Musculoskeletal: no deformity, no tenderness to palpation  Skin: warm, dry  Neurologic:sleeping         HISTORY:     Principal Problem:    Left lower lobe pneumonia ([address] (1/8/2019)    Active Problems:    BPH (benign prostatic hyperplasia) (12/27/2018)      GERD (gastroesophageal reflux disease) (12/27/2018)      HTN (hypertension) (12/27/2018)      Hypernatremia (12/27/2018)      Closed displaced fracture of left femoral neck ([address] (12/27/2018)      Dementia (12/27/2018)      Seizures ([address] ()      Past Medical History:   Diagnosis Date    Anxiety     BPH (benign prostatic hyperplasia)     Depression     GERD (gastroesophageal reflux disease)     Hypertension     Seizures (HCC)       Past Surgical History:   Procedure Laterality Date    HX COLONOSCOPY      HX HEENT      nasal septum    [place] EGD DELIVER THERMAL ENERGY SPHNCTR/CARDIA GERD        Family History   Problem Relation Age of Onset    Cancer Father     Heart Disease Father       History reviewed, no pertinent family history.   Social History     Tobacco Use    Smoking status: Never Smoker    Smokeless tobacco: Never Used   Substance Use Topics    Alcohol use: No     Allergies   Allergen Reactions    Augmentin [Amoxicillin-Pot Clavulanate] Rash      Current Facility-Administered Medications   Medication Dose Route Frequency    [START ON 1/10/2019] Vancomycin Random AM 1/10  1 Each Other ONCE    cefepime (MAXIPIME) 2 g in 0.9% sodium chloride (MBP/ADV) 100 mL  2 g IntraVENous Q24H    sodium chloride (NS) flush 5-40 mL  5-40 mL IntraVENous Q8H    sodium chloride (NS) flush 5-40 mL  5-40 mL IntraVENous PRN    acetaminophen (TYLENOL) tablet 650 mg  650 mg Oral Q4H PRN    ondansetron (ZOFRAN) injection 4 mg  4 mg IntraVENous Q4H PRN    Vancomycin - Pharmacist to dose based on level   Other Rx Dosing/Monitoring    levoFLOXacin (LEVAQUIN) 750 mg in D5W IVPB  750 mg IntraVENous Q48H    dextrose 5 % - 0.45% NaCl infusion  100 mL/hr IntraVENous CONTINUOUS    apixaban (ELIQUIS) tablet 5 mg  5 mg Oral BID    albuterol (PROVENTIL VENTOLIN) nebulizer solution 2.5 mg  2.5 mg Nebulization Q4H PRN    aspirin delayed-release tablet 81 mg  81 mg Oral DAILY    atorvastatin (LIPITOR) tablet 40 mg  40 mg Oral QHS    donepezil (ARICEPT) tablet 10 mg  10 mg Oral QHS    lacosamide (VIMPAT) tablet 150 mg  150 mg Oral Q12H    memantine (NAMENDA) tablet 10 mg  10 mg Oral QHS    valproic acid (DEPAKENE) capsule 500 mg  500 mg Oral BID    oxybutynin chloride XL (DITROPAN XL) tablet 10 mg  10 mg Oral DAILY    arformoterol (BROVANA) neb solution 15 mcg  15 mcg Nebulization BID RT    budesonide (PULMICORT) 500 mcg/2 ml nebulizer suspension  500 mcg Nebulization BID RT    pantoprazole (PROTONIX) tablet 40 mg  40 mg Oral ACB          LAB AND IMAGING FINDINGS:     Lab Results   Component Value Date/Time    WBC 8.7 01/09/2019 05:42 AM    HGB 7.6 (L) 01/09/2019 05:42 AM    PLATELET 551 (H) 80/75/3669 05:42 AM     Lab Results   Component Value Date/Time    Sodium 152 (H) 01/09/2019 05:42 AM    Potassium 4.0 01/09/2019 05:42 AM    Chloride 116 (H) 01/09/2019 05:42 AM    CO2 28 01/09/2019 05:42 AM    BUN 26 (H) 01/09/2019 05:42 AM    Creatinine 2.19 (H) 01/09/2019 05:42 AM    Calcium 8.8 01/09/2019 05:42 AM    Magnesium 2.1 01/09/2019 05:42 AM    Phosphorus 3.0 01/02/2019 05:30 AM      Lab Results   Component Value Date/Time    AST (SGOT) 17 01/08/2019 03:40 PM    Alk.  phosphatase 93 01/08/2019 03:40 PM    Protein, total 6.4 01/08/2019 03:40 PM    Albumin 2.2 (L) 01/08/2019 03:40 PM    Globulin 4.2 (H) 01/08/2019 03:40 PM     No results found for: INR, PTMR, PTP, PT1, PT2, APTT   No results found for: IRON, FE, TIBC, IBCT, PSAT, FERR   No results found for: PH, PCO2, PO2  No components found for: [place]   Lab Results   Component Value Date/Time     12/27/2018 11:20 AM                Total time: 50  Counseling / coordination time, spent as noted above: 45  > 50% counseling / coordination?: yes    Prolonged service was provided for []30 min   []75 min in face to face time in the presence of the patient, spent as noted above. Time Start:   Time End:   Note: this can only be billed with 37169 (initial) or [id number] (follow up). If multiple start / stop times, list each separately.

## 2019-01-09 NOTE — PROGRESS NOTES
Problem: Mobility Impaired (Adult and Pediatric) Goal: *Acute Goals and Plan of Care (Insert Text) Physical Therapy Goals Initiated 1/9/2019 1. Patient will move from supine to sit and sit to supine , scoot up and down and roll side to side in bed with minimal assistance x 2 within 7 day(s). 2.  Patient will transfer from bed to chair and chair to bed with moderate assistance x 2 using the least restrictive device within 7 day(s). 3.  Patient will perform sit <> stand with moderate assistance x 2  within 7 day(s). 4.  Patient will ambulate with moderate assistance x 2  for 25 feet with RW within 7 day(s). physical Therapy EVALUATION Patient: Melissa Paz (79 y.o. male) Date: 1/9/2019 Primary Diagnosis: Left lower lobe pneumonia ([address] [J18.1] Precautions:   Bed Alarm, Fall ASSESSMENT : 
Based on the objective data described below, the patient who has h/o dysphagia and admitted with PNA presents with advanced dementia, unable to follow commands and lack of awareness of environment. Patient with recent admission for hip fx after GLF and underwent left chiquis arthroplasty. On 12/27 he discharged from [facility] to SNF needing max assistance x 2 for basic transfers. Today patient surprisingly able to assist and tolerate more activity than anticipated. Patient overall needing mod to max assist x 2 for OOB and to ambulate around bed with RW. It's unsafe to leave him edge of bed because he does not attend to edge when scooting and if not blocked would have scooted off the bed on right side. Unsafe to leave him up in chair even with chair alarm as his movements are too quick for staff to be able to prevent him from falling unless 1:1 sitter present. Patient positioned up in bed with HOB elevated but he slides himself down into flexed position. PT will follow to ensure he is OOB and ambulating daily to gain independence and tolerance for mobility. Patient requires 24/7 care and assistance - LTC. Patient will benefit from skilled intervention to address the above impairments. Patients rehabilitation potential is considered to be Poor - Factors which may influence rehabilitation potential include:  
[]         None noted 
[x]         Mental ability/status [x]         Medical condition 
[]         Home/family situation and support systems 
[x]         Safety awareness 
[]         Pain tolerance/management 
[]         Other: PLAN : 
Recommendations and Planned Interventions: 
[x]           Bed Mobility Training             []    Neuromuscular Re-Education 
[x]           Transfer Training                   []    Orthotic/Prosthetic Training 
[x]           Gait Training                         []    Modalities [x]           Therapeutic Exercises           []    Edema Management/Control 
[x]           Therapeutic Activities            [x]    Patient and Family Training/Education 
[]           Other (comment): Frequency/Duration: Patient will be followed by physical therapy  5 times a week to address goals. Discharge Recommendations: [facility] Further Equipment Recommendations for Discharge: none SUBJECTIVE:  
Patient stated I love you.  OBJECTIVE DATA SUMMARY:  
HISTORY:   
Past Medical History:  
Diagnosis Date  Anxiety  BPH (benign prostatic hyperplasia)  Depression  GERD (gastroesophageal reflux disease)  Hypertension  Seizures ([address] Past Surgical History:  
Procedure Laterality Date  HX COLONOSCOPY    
 HX HEENT    
 nasal septum  UT EGD DELIVER THERMAL ENERGY SPHNCTR/CARDIA GERD Prior Level of Function/Home Situation:  
Personal factors and/or comorbidities impacting plan of care:  
 
Home Situation Home Environment: Rehabilitation facility Living Alone: No 
Patient Expects to be Discharged to[de-identified] Rehabilitation facility Tub or Shower Type: Shower EXAMINATION/PRESENTATION/DECISION MAKING: Critical Behavior: Neurologic State: Alert Orientation Level: Oriented to person Cognition: Decreased command following, Impulsive, Memory loss, Poor safety awareness Safety/Judgement: Lack of insight into deficits, Decreased awareness of environment, Decreased awareness of need for assistance, Decreased awareness of need for safety Hearing: Auditory Auditory Impairment: Hard of hearing, bilateral, Hearing aid(s) Hearing Aids/Status: Bilateral, At home Range Of Motion: 
AROM: Within functional limits PROM: Within functional limits Strength:   
Strength: Generally decreased, functional 
  
  
  
  
  
  
Tone & Sensation:  
Tone: Normal 
  
  
  
  
  
  
  
  
   
Coordination: 
Coordination: Generally decreased, functional 
 
  
Functional Mobility: 
Bed Mobility: 
  
Supine to Sit: Contact guard assistance Sit to Supine: Minimum assistance Scooting: Contact guard assistance(verbal cues ) Transfers: 
Sit to Stand: Moderate assistance;Assist x2 Stand to Sit: Moderate assistance;Assist x2 Balance:  
Sitting: Impaired Sitting - Static: Poor (constant support) Sitting - Dynamic: Poor (constant support) Standing: Impaired Standing - Static: Poor Standing - Dynamic : PoorAmbulation/Gait Training:Distance (ft): 10 Feet (ft)(around bed) Assistive Device: Gait belt;Walker, rolling Ambulation - Level of Assistance: Minimal assistance; Moderate assistance; Additional time;Assist x2 Gait Abnormalities: Antalgic;Decreased step clearance(crouched gait, buckling Left knee after 2' ) Stairs - Level of Assistance: (NT) 
   
 
 
Pain: unable to rateActivity Tolerance:  
Limited/poor Please refer to the flowsheet for vital signs taken during this treatment. After treatment:  
[]         Patient left in no apparent distress sitting up in chair 
[x]         Patient left in no apparent distress in bed 
[x]         Call bell left within reach [x]         Nursing notified []         Caregiver present [x]         Bed alarm activated COMMUNICATION/EDUCATION:  
The patients plan of care was discussed with: Registered Nurse and OT [x]         Fall prevention education was provided and the patient/caregiver indicated understanding. [x]         Patient/family have participated as able in goal setting and plan of care. []         Patient/family agree to work toward stated goals and plan of care. []         Patient understands intent and goals of therapy, but is neutral about his/her participation. []         Patient is unable to participate in goal setting and plan of care. Thank you for this referral. 
Misa Choi, PT, DPT Time Calculation: 20 mins

## 2019-01-09 NOTE — PROGRESS NOTES
Daily Progress Note: 1/9/2019 Sebastian Matta, DO Assessment/Plan:  
Left lower lobe pneumonia ([facility] (1/8/2019):  Prior hx of swallow study with aspiration of liquids. Concern for aspiration pneumonia and HCAP. -- empiric levaquin, cefepime, and vanc 
-- check pneumonia serologies -- unlikely to cooperate with sputum culture 
  
Lactic acidemia:  Not c/w severe sepsis as only has tachycardia for SIRS and no other signs of end organ injury (creatinine elevation is mild with baseline CKD). Given 1L IVF. -- trend lactate 
  
HTN (hypertension) (12/27/2018):  BP mildly low in ED. Likely from poor po intake and pneumonia. -- continue IVF 
-- hold metoprolol and imdur 
  
Dementia (12/27/2018):  Likely has acute exacerbations of confusion due to acute illness and multiple recent hospitalizations. -- frequent orienting 
-- continue namenda and aricept 
  
BPH (benign prostatic hyperplasia) (12/27/2018): 
-- may need to consider stopping ditropan if it is thought to be contributing to confusion 
  
GERD (gastroesophageal reflux disease) (12/27/2018): -- continue PPI 
  
Hypernatremia (12/27/2018):  Noted on recent admission as well. -- use 1/2 NS IVF 
  
Closed displaced fracture of left femoral neck ([facility] (12/27/2018): Last admission had surgery. Wound healing. 
-- PT/OT 
  
Seizures ([facility] [address] ():  Diagnosed on last admission by neurology. -- continue vimpat and valproic acid 
  
MIGUEL on CKD 3:  Likely from volume depletion. -- monitor with IVF 
   
 
Problem List: 
Problem List as of 1/9/2019 Date Reviewed: 1/8/2019 Codes Class Noted - Resolved * (Principal) Left lower lobe pneumonia ([facility] [address] ICD-10-CM: J18.1 ICD-9-CM: 148  1/8/2019 - Present Seizures ([facility] ICD-10-CM: R56.9 ICD-9-CM: 780.39  Unknown - Present Hip fracture ([facility] [facility] ICD-10-CM: U73.159B ICD-9-CM: 820.8  12/27/2018 - Present Thrombocytopenia ([facility] [address] ICD-10-CM: D69.6 ICD-9-CM: 287.5  12/27/2018 - Present Anxiety and depression ICD-10-CM: F41.9, F32.9 ICD-9-CM: 300.00, 311  12/27/2018 - Present BPH (benign prostatic hyperplasia) ICD-10-CM: N40.0 ICD-9-CM: 600.00  12/27/2018 - Present GERD (gastroesophageal reflux disease) ICD-10-CM: K21.9 ICD-9-CM: 530.81  12/27/2018 - Present HTN (hypertension) ICD-10-CM: I10 
ICD-9-CM: 401.9  12/27/2018 - Present Seizure ([facility] ICD-10-CM: R56.9 ICD-9-CM: 780.39  12/27/2018 - Present UTI (urinary tract infection) ICD-10-CM: N39.0 ICD-9-CM: 599.0  12/27/2018 - Present Hypernatremia ICD-10-CM: E87.0 ICD-9-CM: 276.0  12/27/2018 - Present Acute on chronic renal failure (HCC) ICD-10-CM: N17.9, N18.9 ICD-9-CM: 584.9, 585.9  12/27/2018 - Present Closed displaced fracture of left femoral neck ([facility] [address] ICD-10-CM: L25.784K ICD-9-CM: 820.8  12/27/2018 - Present Fall ICD-10-CM: W19. Cindyjasonmasoud WoodardNeisha ICD-9-CM: E888.9  12/27/2018 - Present Dementia ICD-10-CM: F03.90 ICD-9-CM: 294.20  12/27/2018 - Present T1 vertebral fracture ([facility] [address] ICD-10-CM: B95.695L 
ICD-9-CM: 805.2  12/27/2018 - Present Trimalleolar fracture of right ankle ICD-10-CM: F88.699G ICD-9-CM: 824.6  10/24/2012 - Present Subjective:  
 68 y.o.  male who is admitted with Left lower lobe pneumonia ([facility] [address] Mr. Viveros Just presented to the Emergency Department today from [facility] due to lethargy. Patient slid out of chair yesterday without injury. Today staff noted patient to be more lethargic and not acting like himself. Wife is in ED with patient and reports patient to be very fidgety and has been trying to remove his clothing since recent DC to SNF. Has been confused but not recently worse. Has been thickening liquids and there has been concern in the past for aspiration. No recent report of coughing or choking with eating/drinking. Denies sob.   No known fever.  Denies chest pains. No hx of n/v. (Dr Lexis Walden) 
 
:  He has no complaints this AM and says he does not hurt anywhere. Answers questions fairly well but gives inconsistent answers. CXR yesterday revealed LLL pneumonia. Lactic acid back to normal range. Afeb. WBC ok. Na+ sl higher than last admission - cont IVF. Hb down to 7.6 - cont to monitor. Creat in 2s and slowly improving. Review of Systems:  
Review of systems not obtained due to patient factors. Objective:  
Physical Exam:  
 
Visit Vitals /87 (BP 1 Location: Left arm, BP Patient Position: At rest) Pulse 69 Temp 98.5 °F (36.9 °C) Resp 20 Ht 5' 10\" (1.778 m) Wt 61.2 kg (135 lb) SpO2 95% BMI 19.37 kg/m² O2 Device: Room air Temp (24hrs), Av.4 °F (36.9 °C), Min:98.3 °F (36.8 °C), Max:98.5 °F (36.9 °C) No intake/output data recorded.  1901 -  0700 In: 100 [I.V.:100] Out: - General:  thin, cooperative, no distress, appears stated age. Head:  Normocephalic, without obvious abnormality, atraumatic. Eyes:  Conjunctivae/corneas clear. EOMs intact. Nose: Nares normal. Septum midline. Mucosa normal. No drainage or sinus tenderness. Throat: Lips, mucosa, and tongue moist.. Neck: Supple, symmetrical, trachea midline, no adenopathy, thyroid: no enlargement/tenderness/nodules, no carotid bruit and no JVD. Back:   Symmetric, no curvature. ROM normal. No CVA tenderness. Lungs:   A few scattered rhonchi bilaterally - no rales currently. Chest wall:  No tenderness or deformity. Heart:  Regular rate and rhythm, S1, S2 normal, no murmur, click, rub or gallop. Abdomen:   Soft, non-tender. Bowel sounds normal. No masses,  No organomegaly. Extremities: no cyanosis or edema. No calf tenderness or cords. Left hip surg site without signs of infection Pulses: 2+ and symmetric all extremities. Skin: Skin color, texture, turgor normal. No rashes or lesions Neurologic: Alert and oriented X 1-2. Will usually follow 1 step commands and moves extrem to command.  equal.  No cogwheeling or rigidity. Gait not tested at this time. Sensation grossly normal to touch. Gross motor of extremities normal.    
 
Data Review:  
  Port CXR 1/8/19 IMPRESSION: Patchy consolidation of left mid and lower lung. Recent Days: 
Recent Labs 01/09/19 
0542 01/08/19 
1540 WBC 8.7 10.7 HGB 7.6* 8.8* HCT 25.3* 28.9*  
* 546* Recent Labs 01/09/19 
0542 01/08/19 
1941 01/08/19 
1540 * 151* 151*  
K 4.0 4.1 4.7 * 116* 112* CO2 28 28 29  120* 102* BUN 26* 28* 30* CREA 2.19* 2.21* 2.51* CA 8.8 7.6* 9.1 MG 2.1  --   --   
ALB  --   --  2.2* TBILI  --   --  0.2 SGOT  --   --  17 ALT  --   --  19 No results for input(s): PH, PCO2, PO2, HCO3, FIO2 in the last 72 hours. 24 Hour Results: 
Recent Results (from the past 24 hour(s)) EKG, 12 LEAD, INITIAL Collection Time: 01/08/19  3:01 PM  
Result Value Ref Range Ventricular Rate 102 BPM  
 Atrial Rate 102 BPM  
 P-R Interval 174 ms QRS Duration 112 ms  
 Q-T Interval 352 ms QTC Calculation (Bezet) 458 ms Calculated P Axis 29 degrees Calculated R Axis 45 degrees Calculated T Axis 36 degrees Diagnosis Sinus tachycardia Otherwise normal ECG When compared with ECG of 30-DEC-2018 14:59, No significant change was found Confirmed by Panda Spring MD., Joby ([id number]) on 1/8/2019 4:33:40 PM 
  
CBC WITH AUTOMATED DIFF Collection Time: 01/08/19  3:40 PM  
Result Value Ref Range WBC 10.7 4.1 - 11.1 K/uL  
 RBC 3.01 (L) 4.10 - 5.70 M/uL HGB 8.8 (L) 12.1 - 17.0 g/dL HCT 28.9 (L) 36.6 - 50.3 % MCV 96.0 80.0 - 99.0 FL  
 MCH 29.2 26.0 - 34.0 PG  
 MCHC 30.4 30.0 - 36.5 g/dL  
 RDW 16.2 (H) 11.5 - 14.5 % PLATELET 262 (H) 092 - 400 K/uL MPV 10.7 8.9 - 12.9 FL  
 NRBC 0.0 0  WBC ABSOLUTE NRBC 0.00 0.00 - 0.01 K/uL NEUTROPHILS 72 32 - 75 % LYMPHOCYTES 10 (L) 12 - 49 % MONOCYTES 14 (H) 5 - 13 % EOSINOPHILS 2 0 - 7 % BASOPHILS 0 0 - 1 % IMMATURE GRANULOCYTES 2 (H) 0.0 - 0.5 % ABS. NEUTROPHILS 7.7 1.8 - 8.0 K/UL  
 ABS. LYMPHOCYTES 1.1 0.8 - 3.5 K/UL  
 ABS. MONOCYTES 1.5 (H) 0.0 - 1.0 K/UL  
 ABS. EOSINOPHILS 0.2 0.0 - 0.4 K/UL  
 ABS. BASOPHILS 0.0 0.0 - 0.1 K/UL  
 ABS. IMM. GRANS. 0.2 (H) 0.00 - 0.04 K/UL  
 DF SMEAR SCANNED    
 RBC COMMENTS ANISOCYTOSIS 2+ 
    
 RBC COMMENTS MICROCYTOSIS 1+ 
    
 RBC COMMENTS MACROCYTOSIS 1+ 
    
 RBC COMMENTS OVALOCYTES PRESENT 
    
 RBC COMMENTS SCHISTOCYTES PRESENT 
    
 RBC COMMENTS HYPOCHROMIA 1+ METABOLIC PANEL, COMPREHENSIVE Collection Time: 01/08/19  3:40 PM  
Result Value Ref Range Sodium 151 (H) 136 - 145 mmol/L Potassium 4.7 3.5 - 5.1 mmol/L Chloride 112 (H) 97 - 108 mmol/L  
 CO2 29 21 - 32 mmol/L Anion gap 10 5 - 15 mmol/L Glucose 102 (H) 65 - 100 mg/dL BUN 30 (H) 6 - 20 MG/DL Creatinine 2.51 (H) 0.70 - 1.30 MG/DL  
 BUN/Creatinine ratio 12 12 - 20 GFR est AA 31 (L) >60 ml/min/1.73m2 GFR est non-AA 25 (L) >60 ml/min/1.73m2 Calcium 9.1 8.5 - 10.1 MG/DL Bilirubin, total 0.2 0.2 - 1.0 MG/DL  
 ALT (SGPT) 19 12 - 78 U/L  
 AST (SGOT) 17 15 - 37 U/L Alk. phosphatase 93 45 - 117 U/L Protein, total 6.4 6.4 - 8.2 g/dL Albumin 2.2 (L) 3.5 - 5.0 g/dL Globulin 4.2 (H) 2.0 - 4.0 g/dL A-G Ratio 0.5 (L) 1.1 - 2.2 CULTURE, BLOOD, PAIRED Collection Time: 01/08/19  3:40 PM  
Result Value Ref Range Special Requests: NO SPECIAL REQUESTS Culture result: NO GROWTH AFTER 13 HOURS    
LACTIC ACID Collection Time: 01/08/19  3:40 PM  
Result Value Ref Range Lactic acid 2.1 (HH) 0.4 - 2.0 MMOL/L  
URINALYSIS W/MICROSCOPIC Collection Time: 01/08/19  5:52 PM  
Result Value Ref Range Color YELLOW/STRAW Appearance CLEAR CLEAR  Specific gravity 1.010 1.003 - 1.030    
 pH (UA) 7.0 5.0 - 8.0 Protein TRACE (A) NEG mg/dL Glucose NEGATIVE  NEG mg/dL Ketone NEGATIVE  NEG mg/dL Bilirubin NEGATIVE  NEG Blood NEGATIVE  NEG Urobilinogen 0.2 0.2 - 1.0 EU/dL Nitrites NEGATIVE  NEG Leukocyte Esterase NEGATIVE  NEG    
 WBC 0-4 0 - 4 /hpf  
 RBC 0-5 0 - 5 /hpf Epithelial cells FEW FEW /lpf Bacteria NEGATIVE  NEG /hpf Hyaline cast 0-2 0 - 5 /lpf URINE CULTURE HOLD SAMPLE Collection Time: 01/08/19  5:52 PM  
Result Value Ref Range Urine culture hold URINE ON HOLD IN MICROBIOLOGY DEPT FOR 3 DAYS. IF UNPRESERVED URINE IS SUBMITTED, IT CANNOT BE USED FOR ADDITIONAL TESTING AFTER 24 HRS, RECOLLECTION WILL BE REQUIRED. LACTIC ACID Collection Time: 01/08/19  7:41 PM  
Result Value Ref Range Lactic acid 1.0 0.4 - 2.0 MMOL/L  
METABOLIC PANEL, BASIC Collection Time: 01/08/19  7:41 PM  
Result Value Ref Range Sodium 151 (H) 136 - 145 mmol/L Potassium 4.1 3.5 - 5.1 mmol/L Chloride 116 (H) 97 - 108 mmol/L  
 CO2 28 21 - 32 mmol/L Anion gap 7 5 - 15 mmol/L Glucose 120 (H) 65 - 100 mg/dL BUN 28 (H) 6 - 20 MG/DL Creatinine 2.21 (H) 0.70 - 1.30 MG/DL  
 BUN/Creatinine ratio 13 12 - 20 GFR est AA 36 (L) >60 ml/min/1.73m2 GFR est non-AA 29 (L) >60 ml/min/1.73m2 Calcium 7.6 (L) 8.5 - 10.1 MG/DL  
CBC W/O DIFF Collection Time: 01/09/19  5:42 AM  
Result Value Ref Range WBC 8.7 4.1 - 11.1 K/uL  
 RBC 2.65 (L) 4.10 - 5.70 M/uL HGB 7.6 (L) 12.1 - 17.0 g/dL HCT 25.3 (L) 36.6 - 50.3 % MCV 95.5 80.0 - 99.0 FL  
 MCH 28.7 26.0 - 34.0 PG  
 MCHC 30.0 30.0 - 36.5 g/dL  
 RDW 16.2 (H) 11.5 - 14.5 % PLATELET 315 (H) 680 - 400 K/uL MPV 10.7 8.9 - 12.9 FL  
 NRBC 0.0 0  WBC ABSOLUTE NRBC 0.00 0.00 - 0.01 K/uL METABOLIC PANEL, BASIC Collection Time: 01/09/19  5:42 AM  
Result Value Ref Range Sodium 152 (H) 136 - 145 mmol/L  Potassium 4.0 3.5 - 5.1 mmol/L  
 Chloride 116 (H) 97 - 108 mmol/L  
 CO2 28 21 - 32 mmol/L Anion gap 8 5 - 15 mmol/L Glucose 100 65 - 100 mg/dL BUN 26 (H) 6 - 20 MG/DL Creatinine 2.19 (H) 0.70 - 1.30 MG/DL  
 BUN/Creatinine ratio 12 12 - 20 GFR est AA 36 (L) >60 ml/min/1.73m2 GFR est non-AA 30 (L) >60 ml/min/1.73m2 Calcium 8.8 8.5 - 10.1 MG/DL MAGNESIUM Collection Time: 01/09/19  5:42 AM  
Result Value Ref Range Magnesium 2.1 1.6 - 2.4 mg/dL Medications reviewed Current Facility-Administered Medications Medication Dose Route Frequency  cefepime (MAXIPIME) 2 g in 0.9% sodium chloride (MBP/ADV) 100 mL  2 g IntraVENous Q24H  
 sodium chloride (NS) flush 5-40 mL  5-40 mL IntraVENous Q8H  
 sodium chloride (NS) flush 5-40 mL  5-40 mL IntraVENous PRN  
 acetaminophen (TYLENOL) tablet 650 mg  650 mg Oral Q4H PRN  
 ondansetron (ZOFRAN) injection 4 mg  4 mg IntraVENous Q4H PRN  Vancomycin - Pharmacist to dose based on level   Other Rx Dosing/Monitoring  levoFLOXacin (LEVAQUIN) 750 mg in D5W IVPB  750 mg IntraVENous Q48H  
 dextrose 5 % - 0.45% NaCl infusion  100 mL/hr IntraVENous CONTINUOUS  
 apixaban (ELIQUIS) tablet 5 mg  5 mg Oral BID  albuterol (PROVENTIL VENTOLIN) nebulizer solution 2.5 mg  2.5 mg Nebulization Q4H PRN  
 aspirin delayed-release tablet 81 mg  81 mg Oral DAILY  atorvastatin (LIPITOR) tablet 40 mg  40 mg Oral QHS  donepezil (ARICEPT) tablet 10 mg  10 mg Oral QHS  lacosamide (VIMPAT) tablet 150 mg  150 mg Oral Q12H  
 memantine (NAMENDA) tablet 10 mg  10 mg Oral QHS  valproic acid (DEPAKENE) capsule 500 mg  500 mg Oral BID  
 oxybutynin chloride XL (DITROPAN XL) tablet 10 mg  10 mg Oral DAILY  arformoterol (BROVANA) neb solution 15 mcg  15 mcg Nebulization BID RT  
 budesonide (PULMICORT) 500 mcg/2 ml nebulizer suspension  500 mcg Nebulization BID RT  
 pantoprazole (PROTONIX) tablet 40 mg  40 mg Oral ACB Care Plan discussed with: Patient/Family and Nurse Total time spent with patient: 30 minutes.  
 
Isabel Solano MD

## 2019-01-09 NOTE — ROUTINE PROCESS
Patient has continuous high blood pressures for the last 3 reads. Dr. Wellington Jarquin was notified. Patient is oriented to self. He is NPO with meds but cannot swallow without a thickened substance such as applesauce. Spoke with Elie from Speech therapy who would like to consider getting patient a modified barrium swallow. Per telephone readback order from Dr. Kat Gray. Order a modified barium swallow study.

## 2019-01-09 NOTE — PROGRESS NOTES
1/9/2019 5:45 PM Met with pt's wife. Discussed pt's stay at [facility] and possible discharge dispositions. Pt's wife reported she feels pt was discharged from [facility] too early and [facility] did not know how to handle pt in his current state. Pt's wife reported she would like pt to return to [facility] if able at discharge. CM will follow. 1/9/2019 4:55 PM EMR reviewed. Pt is readmission. Reason for Readmission: Left lower lobe pneumonia RRAT Score and Risk Level:  23 Level of Readmission:  Level 1 [facility]  12/27-1/2 for hip fracture. Pt discharged to [facility] Care Conference scheduled: tbd Resources/supports as identified by patient/family:  Supportive wife Top Challenges facing patient (as identified by patient/family and CM): Finances/Medication cost?  No concerns has rx coverage Transportation  Pt's wife transports but she does not drive at night Support system or lack thereof? Supportive wife Living arrangements? Lives with wife Self-care/ADLs/Cognition? Requires assistance for adls and iadls Current Advanced Directive/Advance Care Plan: Pt's wife to bring to the hospital  
        
Plan for utilizing home health:  Tbd, possible home with services vs SNF placement Likelihood of additional readmission:  HIGH Transition of Care Plan:    Based on readmission, the patient's previous Plan of Care 
 has been evaluated and/or modified. The current Transition of Care Plan is: tbd, pending pt's progress. Palliative is consulted. CM will follow for final discharge recommendations. Called and lvm with pt's wife. Updates sent to [facility] via [employer]. SHEKHAR Redman Care Management Interventions PCP Verified by CM: Yes(Jacklyn Maurice, no NN ) MyChart Signup: No 
Discharge Durable Medical Equipment: No 
Physical Therapy Consult:  Yes 
 Occupational Therapy Consult: Yes Speech Therapy Consult: Yes Current Support Network: [phone]

## 2019-01-10 LAB
ALBUMIN SERPL-MCNC: 2 G/DL (ref 3.5–5)
ALBUMIN/GLOB SERPL: 0.5 {RATIO} (ref 1.1–2.2)
ALP SERPL-CCNC: 94 U/L (ref 45–117)
ALT SERPL-CCNC: 14 U/L (ref 12–78)
ANION GAP SERPL CALC-SCNC: 9 MMOL/L (ref 5–15)
AST SERPL-CCNC: 20 U/L (ref 15–37)
BACTERIA SPEC CULT: ABNORMAL
BACTERIA SPEC CULT: ABNORMAL
BASOPHILS # BLD: 0.1 K/UL (ref 0–0.1)
BASOPHILS NFR BLD: 1 % (ref 0–1)
BILIRUB SERPL-MCNC: 0.3 MG/DL (ref 0.2–1)
BUN SERPL-MCNC: 21 MG/DL (ref 6–20)
BUN/CREAT SERPL: 10 (ref 12–20)
CALCIUM SERPL-MCNC: 9.1 MG/DL (ref 8.5–10.1)
CHLORIDE SERPL-SCNC: 116 MMOL/L (ref 97–108)
CO2 SERPL-SCNC: 28 MMOL/L (ref 21–32)
CREAT SERPL-MCNC: 2.1 MG/DL (ref 0.7–1.3)
DIFFERENTIAL METHOD BLD: ABNORMAL
EOSINOPHIL # BLD: 0.3 K/UL (ref 0–0.4)
EOSINOPHIL NFR BLD: 4 % (ref 0–7)
ERYTHROCYTE [DISTWIDTH] IN BLOOD BY AUTOMATED COUNT: 16.3 % (ref 11.5–14.5)
FLUID CULTURE, SPNG2: NORMAL
GLOBULIN SER CALC-MCNC: 4.1 G/DL (ref 2–4)
GLUCOSE SERPL-MCNC: 101 MG/DL (ref 65–100)
HCT VFR BLD AUTO: 29.7 % (ref 36.6–50.3)
HGB BLD-MCNC: 8.9 G/DL (ref 12.1–17)
IMM GRANULOCYTES # BLD AUTO: 0.1 K/UL (ref 0–0.04)
IMM GRANULOCYTES NFR BLD AUTO: 2 % (ref 0–0.5)
L PNEUMO1 AG UR QL IA: NEGATIVE
LYMPHOCYTES # BLD: 1.4 K/UL (ref 0.8–3.5)
LYMPHOCYTES NFR BLD: 18 % (ref 12–49)
M PNEUMO IGG SER IA-ACNC: 1105 U/ML (ref 0–99)
M PNEUMO IGM SER IA-ACNC: <770 U/ML (ref 0–769)
MAGNESIUM SERPL-MCNC: 2.1 MG/DL (ref 1.6–2.4)
MCH RBC QN AUTO: 29 PG (ref 26–34)
MCHC RBC AUTO-ENTMCNC: 30 G/DL (ref 30–36.5)
MCV RBC AUTO: 96.7 FL (ref 80–99)
MONOCYTES # BLD: 1.1 K/UL (ref 0–1)
MONOCYTES NFR BLD: 14 % (ref 5–13)
NEUTS SEG # BLD: 4.7 K/UL (ref 1.8–8)
NEUTS SEG NFR BLD: 61 % (ref 32–75)
NRBC # BLD: 0 K/UL (ref 0–0.01)
NRBC BLD-RTO: 0 PER 100 WBC
ORGANISM ID, SPNG3: NORMAL
PLATELET # BLD AUTO: 458 K/UL (ref 150–400)
PLEASE NOTE, SPNG4: NORMAL
PMV BLD AUTO: 10.6 FL (ref 8.9–12.9)
POTASSIUM SERPL-SCNC: 3.8 MMOL/L (ref 3.5–5.1)
PROT SERPL-MCNC: 6.1 G/DL (ref 6.4–8.2)
RBC # BLD AUTO: 3.07 M/UL (ref 4.1–5.7)
S PNEUM AG SPEC QL LA: NEGATIVE
SERVICE CMNT-IMP: ABNORMAL
SODIUM SERPL-SCNC: 153 MMOL/L (ref 136–145)
SPECIMEN SOURCE: NORMAL
SPECIMEN SOURCE: NORMAL
SPECIMEN, SPNG1: NORMAL
VANCOMYCIN SERPL-MCNC: 6 UG/ML
WBC # BLD AUTO: 7.7 K/UL (ref 4.1–11.1)

## 2019-01-10 PROCEDURE — 74011250637 HC RX REV CODE- 250/637: Performed by: INTERNAL MEDICINE

## 2019-01-10 PROCEDURE — 74011250636 HC RX REV CODE- 250/636: Performed by: INTERNAL MEDICINE

## 2019-01-10 PROCEDURE — 97530 THERAPEUTIC ACTIVITIES: CPT

## 2019-01-10 PROCEDURE — 74011000258 HC RX REV CODE- 258: Performed by: INTERNAL MEDICINE

## 2019-01-10 PROCEDURE — 85025 COMPLETE CBC W/AUTO DIFF WBC: CPT

## 2019-01-10 PROCEDURE — 92526 ORAL FUNCTION THERAPY: CPT

## 2019-01-10 PROCEDURE — 65270000029 HC RM PRIVATE

## 2019-01-10 PROCEDURE — 36415 COLL VENOUS BLD VENIPUNCTURE: CPT

## 2019-01-10 PROCEDURE — 80053 COMPREHEN METABOLIC PANEL: CPT

## 2019-01-10 PROCEDURE — 83735 ASSAY OF MAGNESIUM: CPT

## 2019-01-10 PROCEDURE — 80202 ASSAY OF VANCOMYCIN: CPT

## 2019-01-10 PROCEDURE — 74011250637 HC RX REV CODE- 250/637: Performed by: FAMILY MEDICINE

## 2019-01-10 PROCEDURE — 94640 AIRWAY INHALATION TREATMENT: CPT

## 2019-01-10 PROCEDURE — 74011000250 HC RX REV CODE- 250: Performed by: INTERNAL MEDICINE

## 2019-01-10 RX ORDER — ISOSORBIDE MONONITRATE 30 MG/1
30 TABLET, EXTENDED RELEASE ORAL DAILY
Status: DISCONTINUED | OUTPATIENT
Start: 2019-01-11 | End: 2019-01-11 | Stop reason: HOSPADM

## 2019-01-10 RX ORDER — CEFDINIR 300 MG/1
300 CAPSULE ORAL EVERY 12 HOURS
Status: DISCONTINUED | OUTPATIENT
Start: 2019-01-10 | End: 2019-01-11 | Stop reason: HOSPADM

## 2019-01-10 RX ORDER — PANTOPRAZOLE SODIUM 40 MG/1
40 GRANULE, DELAYED RELEASE ORAL
Status: DISCONTINUED | OUTPATIENT
Start: 2019-01-10 | End: 2019-01-11 | Stop reason: HOSPADM

## 2019-01-10 RX ORDER — METOPROLOL TARTRATE 25 MG/1
12.5 TABLET, FILM COATED ORAL 2 TIMES DAILY
Status: DISCONTINUED | OUTPATIENT
Start: 2019-01-10 | End: 2019-01-11 | Stop reason: HOSPADM

## 2019-01-10 RX ADMIN — APIXABAN 5 MG: 5 TABLET, FILM COATED ORAL at 09:10

## 2019-01-10 RX ADMIN — DONEPEZIL HYDROCHLORIDE 10 MG: 5 TABLET, FILM COATED ORAL at 23:05

## 2019-01-10 RX ADMIN — ATORVASTATIN CALCIUM 40 MG: 20 TABLET, FILM COATED ORAL at 23:05

## 2019-01-10 RX ADMIN — METOPROLOL TARTRATE 12.5 MG: 25 TABLET ORAL at 18:53

## 2019-01-10 RX ADMIN — LACOSAMIDE 150 MG: 100 TABLET, FILM COATED ORAL at 23:05

## 2019-01-10 RX ADMIN — APIXABAN 5 MG: 5 TABLET, FILM COATED ORAL at 23:05

## 2019-01-10 RX ADMIN — MEMANTINE 10 MG: 10 TABLET ORAL at 23:05

## 2019-01-10 RX ADMIN — ASPIRIN 81 MG: 81 TABLET, COATED ORAL at 09:10

## 2019-01-10 RX ADMIN — Medication 10 ML: at 18:53

## 2019-01-10 RX ADMIN — LEVOFLOXACIN 750 MG: 5 INJECTION, SOLUTION INTRAVENOUS at 18:53

## 2019-01-10 RX ADMIN — Medication 10 ML: at 06:00

## 2019-01-10 RX ADMIN — VALPROIC ACID 500 MG: 250 CAPSULE, LIQUID FILLED ORAL at 23:06

## 2019-01-10 RX ADMIN — VALPROIC ACID 500 MG: 250 CAPSULE, LIQUID FILLED ORAL at 09:10

## 2019-01-10 RX ADMIN — PANTOPRAZOLE SODIUM 40 MG: 40 GRANULE, DELAYED RELEASE ORAL at 13:47

## 2019-01-10 RX ADMIN — LACOSAMIDE 150 MG: 100 TABLET, FILM COATED ORAL at 09:10

## 2019-01-10 RX ADMIN — CEFDINIR 300 MG: 300 CAPSULE ORAL at 09:19

## 2019-01-10 RX ADMIN — DEXTROSE MONOHYDRATE AND SODIUM CHLORIDE 100 ML/HR: 5; .45 INJECTION, SOLUTION INTRAVENOUS at 00:30

## 2019-01-10 RX ADMIN — ARFORMOTEROL TARTRATE 15 MCG: 15 SOLUTION RESPIRATORY (INHALATION) at 20:18

## 2019-01-10 RX ADMIN — CEFDINIR 300 MG: 300 CAPSULE ORAL at 23:06

## 2019-01-10 RX ADMIN — DEXTROSE MONOHYDRATE AND SODIUM CHLORIDE 100 ML/HR: 5; .45 INJECTION, SOLUTION INTRAVENOUS at 14:40

## 2019-01-10 RX ADMIN — BUDESONIDE 500 MCG: 0.5 INHALANT RESPIRATORY (INHALATION) at 20:18

## 2019-01-10 RX ADMIN — ARFORMOTEROL TARTRATE 15 MCG: 15 SOLUTION RESPIRATORY (INHALATION) at 06:33

## 2019-01-10 RX ADMIN — BUDESONIDE 500 MCG: 0.5 INHALANT RESPIRATORY (INHALATION) at 06:34

## 2019-01-10 NOTE — PROGRESS NOTES
Daily Progress Note: 1/10/2019 Gretchen Reid DO Assessment/Plan:  
Left lower lobe pneumonia ([facility] (1/8/2019):  Prior hx of swallow study with aspiration of liquids. Concern for aspiration pneumonia and HCAP. -- empiric levaquin, cefepime, and vanc 
-- check pneumonia serologies -- unlikely to cooperate with sputum culture 
  
Lactic acidemia:  Not c/w severe sepsis as only has tachycardia for SIRS and no other signs of end organ injury (creatinine elevation is mild with baseline CKD). Given 1L IVF. -- trend lactate 
  
HTN (hypertension) (12/27/2018):  BP mildly low in ED. Likely from poor po intake and pneumonia. -- continue IVF 
-- hold metoprolol and imdur 
  
Dementia (12/27/2018): Metabolic encephalopathy POA on top of dementia AEB increased confusion, AMS and lethargy 2/2 asp pna requiring treatment with IV ABX,  frequent orienting, Namenda &  aricept:  Likely has acute exacerbations of confusion due to acute illness and multiple recent hospitalizations. -- frequent orienting 
-- continue namenda and aricept 
  
BPH (benign prostatic hyperplasia) (12/27/2018): 
-- may need to consider stopping ditropan if it is thought to be contributing to confusion 
  
GERD (gastroesophageal reflux disease) (12/27/2018): -- continue PPI 
  
Hypernatremia (12/27/2018):  Noted on recent admission as well. -- use 1/2 NS IVF 
  
Closed displaced fracture of left femoral neck ([facility] [address] (12/27/2018): Last admission had surgery. Wound healing. 
-- PT/OT 
  
Seizures ([facility] [address] ():  Diagnosed on last admission by neurology. -- continue vimpat and valproic acid 
  
MIGUEL on CKD 3:  Likely from volume depletion. -- monitor with IVF 
   
 
Problem List: 
Problem List as of 1/10/2019 Date Reviewed: 1/8/2019 Codes Class Noted - Resolved * (Principal) Left lower lobe pneumonia ([facility] [address] ICD-10-CM: J18.1 ICD-9-CM: 247  1/8/2019 - Present Seizures ([facility] [address] ICD-10-CM: R56.9 ICD-9-CM: 780.39  Unknown - Present Hip fracture ([facility] ICD-10-CM: L88.723V ICD-9-CM: 820.8  12/27/2018 - Present Thrombocytopenia ([facility] [place] [facility] ICD-10-CM: D69.6 ICD-9-CM: 287.5  12/27/2018 - Present Anxiety and depression ICD-10-CM: F41.9, F32.9 ICD-9-CM: 300.00, 311  12/27/2018 - Present BPH (benign prostatic hyperplasia) ICD-10-CM: N40.0 ICD-9-CM: 600.00  12/27/2018 - Present GERD (gastroesophageal reflux disease) ICD-10-CM: K21.9 ICD-9-CM: 530.81  12/27/2018 - Present HTN (hypertension) ICD-10-CM: I10 
ICD-9-CM: 401.9  12/27/2018 - Present Seizure ([facility] [place] [facility] ICD-10-CM: R56.9 ICD-9-CM: 780.39  12/27/2018 - Present UTI (urinary tract infection) ICD-10-CM: N39.0 ICD-9-CM: 599.0  12/27/2018 - Present Hypernatremia ICD-10-CM: E87.0 ICD-9-CM: 276.0  12/27/2018 - Present Acute on chronic renal failure (HCC) ICD-10-CM: N17.9, N18.9 ICD-9-CM: 584.9, 585.9  12/27/2018 - Present Closed displaced fracture of left femoral neck ([facility] [place] [facility] ICD-10-CM: T44.101S ICD-9-CM: 820.8  12/27/2018 - Present Fall ICD-10-CM: W19. Cindytte Neisha ICD-9-CM: E888.9  12/27/2018 - Present Dementia ICD-10-CM: F03.90 ICD-9-CM: 294.20  12/27/2018 - Present T1 vertebral fracture ([facility] [place] [facility] ICD-10-CM: E23.506Y 
ICD-9-CM: 805.2  12/27/2018 - Present Trimalleolar fracture of right ankle ICD-10-CM: H56.807N ICD-9-CM: 824.6  10/24/2012 - Present Subjective:  
 68 y.o.  male who is admitted with Left lower lobe pneumonia ([facility] Mr. Viveros Just presented to the Emergency Department today from [facility] due to lethargy. Patient slid out of chair yesterday without injury. Today staff noted patient to be more lethargic and not acting like himself. Wife is in ED with patient and reports patient to be very fidgety and has been trying to remove his clothing since recent DC to SNF. Has been confused but not recently worse.   Has been thickening liquids and there has been concern in the past for aspiration. No recent report of coughing or choking with eating/drinking. Denies sob. No known fever. Denies chest pains. No hx of n/v. (Dr Gisela Vaz 81) 
 
:  He has no complaints this AM and says he does not hurt anywhere. Answers questions fairly well but gives inconsistent answers. CXR yesterday revealed LLL pneumonia. Lactic acid back to normal range. Afeb. WBC ok. Na+ sl higher than last admission - cont IVF. Hb down to 7.6 - cont to monitor. Creat in 2s and slowly improving. 1/10:  No complaints. Derails easily when answering questions. No cough. Tmax 99.4. Na+ about the same - will change IVF to 1/2NS. Hb up to 8.9. We will DC IV antibiotics, place on Omnicef po and watch fever curve for next 24 hrs. If ok he can likely go back to rehab tomorrow. Review of Systems:  
Review of systems not obtained due to patient factors. Objective:  
Physical Exam:  
 
Visit Vitals BP (!) 154/96 (BP 1 Location: Left arm, BP Patient Position: At rest) Comment: RN Drew Torres notified Pulse 75 Temp 97.6 °F (36.4 °C) Resp 18 Ht 5' 10\" (1.778 m) Wt 74.9 kg (165 lb 3.2 oz) SpO2 100% BMI 23.70 kg/m² O2 Device: Room air Temp (24hrs), Av.5 °F (36.9 °C), Min:97.6 °F (36.4 °C), Max:99.4 °F (37.4 °C) No intake/output data recorded.  07 -  1900 In: 100 [I.V.:100] Out: - General:  thin, cooperative, no distress, appears stated age. Head:  Normocephalic, without obvious abnormality, atraumatic. Eyes:  Conjunctivae/corneas clear. EOMs intact. Nose: Nares normal. Septum midline. Mucosa normal. No drainage or sinus tenderness. Throat: Lips, mucosa, and tongue moist.. Neck: Supple, symmetrical, trachea midline, no adenopathy, thyroid: no enlargement/tenderness/nodules, no carotid bruit and no JVD. Back:   Symmetric, no curvature. ROM normal. No CVA tenderness. Lungs:   A few scattered rhonchi bilaterally - no rales currently. Chest wall:  No tenderness or deformity. Heart:  Regular rate and rhythm, S1, S2 normal, no murmur, click, rub or gallop. Abdomen:   Soft, non-tender. Bowel sounds normal. No masses,  No organomegaly. Extremities: no cyanosis or edema. No calf tenderness or cords. Left hip surg site without signs of infection Pulses: 2+ and symmetric all extremities. Skin: Skin color, texture, turgor normal. No rashes or lesions Neurologic: Alert and oriented X 1-2. Will usually follow 1 step commands and moves extrem to command.  equal.  No cogwheeling or rigidity. Gait not tested at this time. Sensation grossly normal to touch. Gross motor of extremities normal.    
 
Data Review:  
  Port CXR 1/8/19 IMPRESSION: Patchy consolidation of left mid and lower lung. Recent Days: 
Recent Labs  
  01/10/19 
0326 01/09/19 
0542 01/08/19 
1540 WBC 7.7 8.7 10.7 HGB 8.9* 7.6* 8.8* HCT 29.7* 25.3* 28.9*  
* 480* 546* Recent Labs  
  01/10/19 
0326 01/09/19 
0542 01/08/19 
1941 01/08/19 
1540 * 152* 151* 151*  
K 3.8 4.0 4.1 4.7 * 116* 116* 112* CO2 28 28 28 29 * 100 120* 102* BUN 21* 26* 28* 30* CREA 2.10* 2.19* 2.21* 2.51* CA 9.1 8.8 7.6* 9.1 MG 2.1 2.1  --   --   
ALB 2.0*  --   --  2.2* TBILI 0.3  --   --  0.2 SGOT 20  --   --  17 ALT 14  --   --  19 No results for input(s): PH, PCO2, PO2, HCO3, FIO2 in the last 72 hours. 24 Hour Results: 
Recent Results (from the past 24 hour(s)) METABOLIC PANEL, COMPREHENSIVE Collection Time: 01/10/19  3:26 AM  
Result Value Ref Range Sodium 153 (H) 136 - 145 mmol/L Potassium 3.8 3.5 - 5.1 mmol/L Chloride 116 (H) 97 - 108 mmol/L  
 CO2 28 21 - 32 mmol/L Anion gap 9 5 - 15 mmol/L Glucose 101 (H) 65 - 100 mg/dL BUN 21 (H) 6 - 20 MG/DL  Creatinine 2.10 (H) 0.70 - 1.30 MG/DL  
 BUN/Creatinine ratio 10 (L) 12 - 20    
 GFR est AA 38 (L) >60 ml/min/1.73m2 GFR est non-AA 31 (L) >60 ml/min/1.73m2 Calcium 9.1 8.5 - 10.1 MG/DL Bilirubin, total 0.3 0.2 - 1.0 MG/DL  
 ALT (SGPT) 14 12 - 78 U/L  
 AST (SGOT) 20 15 - 37 U/L Alk. phosphatase 94 45 - 117 U/L Protein, total 6.1 (L) 6.4 - 8.2 g/dL Albumin 2.0 (L) 3.5 - 5.0 g/dL Globulin 4.1 (H) 2.0 - 4.0 g/dL A-G Ratio 0.5 (L) 1.1 - 2.2 MAGNESIUM Collection Time: 01/10/19  3:26 AM  
Result Value Ref Range Magnesium 2.1 1.6 - 2.4 mg/dL CBC WITH AUTOMATED DIFF Collection Time: 01/10/19  3:26 AM  
Result Value Ref Range WBC 7.7 4.1 - 11.1 K/uL  
 RBC 3.07 (L) 4.10 - 5.70 M/uL HGB 8.9 (L) 12.1 - 17.0 g/dL HCT 29.7 (L) 36.6 - 50.3 % MCV 96.7 80.0 - 99.0 FL  
 MCH 29.0 26.0 - 34.0 PG  
 MCHC 30.0 30.0 - 36.5 g/dL  
 RDW 16.3 (H) 11.5 - 14.5 % PLATELET 471 (H) 753 - 400 K/uL MPV 10.6 8.9 - 12.9 FL  
 NRBC 0.0 0  WBC ABSOLUTE NRBC 0.00 0.00 - 0.01 K/uL NEUTROPHILS 61 32 - 75 % LYMPHOCYTES 18 12 - 49 % MONOCYTES 14 (H) 5 - 13 % EOSINOPHILS 4 0 - 7 % BASOPHILS 1 0 - 1 % IMMATURE GRANULOCYTES 2 (H) 0.0 - 0.5 % ABS. NEUTROPHILS 4.7 1.8 - 8.0 K/UL  
 ABS. LYMPHOCYTES 1.4 0.8 - 3.5 K/UL  
 ABS. MONOCYTES 1.1 (H) 0.0 - 1.0 K/UL  
 ABS. EOSINOPHILS 0.3 0.0 - 0.4 K/UL  
 ABS. BASOPHILS 0.1 0.0 - 0.1 K/UL  
 ABS. IMM. GRANS. 0.1 (H) 0.00 - 0.04 K/UL  
 DF AUTOMATED Medications reviewed Current Facility-Administered Medications Medication Dose Route Frequency  cefepime (MAXIPIME) 2 g in 0.9% sodium chloride (MBP/ADV) 100 mL  2 g IntraVENous Q24H  
 sodium chloride (NS) flush 5-40 mL  5-40 mL IntraVENous Q8H  
 sodium chloride (NS) flush 5-40 mL  5-40 mL IntraVENous PRN  
 acetaminophen (TYLENOL) tablet 650 mg  650 mg Oral Q4H PRN  
 ondansetron (ZOFRAN) injection 4 mg  4 mg IntraVENous Q4H PRN  Vancomycin - Pharmacist to dose based on level   Other Rx Dosing/Monitoring  levoFLOXacin (LEVAQUIN) 750 mg in D5W IVPB  750 mg IntraVENous Q48H  
 dextrose 5 % - 0.45% NaCl infusion  100 mL/hr IntraVENous CONTINUOUS  
 apixaban (ELIQUIS) tablet 5 mg  5 mg Oral BID  albuterol (PROVENTIL VENTOLIN) nebulizer solution 2.5 mg  2.5 mg Nebulization Q4H PRN  
 aspirin delayed-release tablet 81 mg  81 mg Oral DAILY  atorvastatin (LIPITOR) tablet 40 mg  40 mg Oral QHS  donepezil (ARICEPT) tablet 10 mg  10 mg Oral QHS  lacosamide (VIMPAT) tablet 150 mg  150 mg Oral Q12H  
 memantine (NAMENDA) tablet 10 mg  10 mg Oral QHS  valproic acid (DEPAKENE) capsule 500 mg  500 mg Oral BID  
 oxybutynin chloride XL (DITROPAN XL) tablet 10 mg  10 mg Oral DAILY  arformoterol (BROVANA) neb solution 15 mcg  15 mcg Nebulization BID RT  
 budesonide (PULMICORT) 500 mcg/2 ml nebulizer suspension  500 mcg Nebulization BID RT  
 pantoprazole (PROTONIX) tablet 40 mg  40 mg Oral ACB Care Plan discussed with: Patient/Family and Nurse Total time spent with patient: 30 minutes.  
Henok Paredes MD

## 2019-01-10 NOTE — PROGRESS NOTES
Nutrition Assessment: 
 
RECOMMENDATIONS/INTERVENTION(S):  
Continue Pureed/honey thick Monitor PO intakes, weight, GI status (unknown last BM) Added Magic cup and Pudding for honey thick liquid requirements Monitor ONS acceptance- previously did Mighty shake but doesn't meet Honey thick Assist pt with meals. ASSESSMENT:  
1/10/19: 68 yr old male admitted for AMS. PMHx: Dementia, GERD, HTN, dysphagia. MST received for weight loss. Per chart pt was 182 lbs ~3 weeks ago. Then 176 lbs ~1 week ago, currently 165 lbs. 17 lbs (9%) loss in 3 weeks is significant for time frame. Pt with visible subcu fat/muscle wasting. Unsure intakes PTA but likely insufficient as evidenced by weight loss. No n/v. Unsure of last BM. No known food allergies. Assist pt with meals. Na 153, Cr 2.10. IVF D 5 1/2 NS @ 100 mL/hr provides 306 kcal.    
 
SUBJECTIVE/OBJECTIVE:  
Diet Order:   Pureed/Honey 
% Eaten:  No data found. Pertinent Medications: [x] Reviewed Labs reviewed:  [x] Anthropometrics: Height: 5' 10\" (177.8 cm) Weight: 74.9 kg (165 lb 3.2 oz) IBW (%IBW):   ( ) UBW (%UBW):   (  %) BMI: Body mass index is 23.7 kg/m². This BMI is indicative of: 
 
 [] Underweight    [x] Normal    [] Overweight    []  Obesity    []  Extreme Obesity (BMI>40) Estimated Nutrition Needs (Based on): 1950 Kcals/day(BMR(1500x1.3)) , 75 g(-90g/day(1.0-1.2g/kg)) Protein Carbohydrate: At Least 130 g/day  Fluids: 1950 mL/day (1mL/kg rounded to 50 mL) Last BM: ?   []Active     []Hyperactive  []Hypoactive       [] Absent   BS Skin:    [] Intact   [] Incision  [] Breakdown   [] DTI   [] Tears/Excoriation/Abrasion  []Edema [] Other: Wt Readings from Last 30 Encounters:  
01/09/19 74.9 kg (165 lb 3.2 oz) 01/01/19 80.1 kg (176 lb 8 oz)  
12/22/16 83 kg (182 lb 15.7 oz) 08/22/15 79.4 kg (175 lb) 10/24/12 79.4 kg (175 lb) NUTRITION DIAGNOSES:  
Problem:  Swallowing difficulty Etiology: related to motor causes altered swallow pattern Signs/Symptoms: as evidenced by SLP recs for pureed/thins NUTRITION INTERVENTIONS: 
Meals/Snacks: General/healthful diet   Supplements: Commercial supplement GOAL:  
Pt will consume >50% of meals and ONS w/o s/s aspiration within 3-5 days Cultural, Jainism, or Ethnic Dietary Needs:   none LEARNING NEEDS (Diet, Food/Nutrient-Drug Interaction):  
 [x] None Identified 
 [] Identified and Education Provided/Documented 
 [] Identified and Pt declined/was not appropriate [x] Interdisciplinary Care Plan Reviewed/Documented  
 [x] Discharge Needs: diet per SLP- pureed/honey plus ONS TID as needed 
 [] No Nutrition Related Discharge Needs NUTRITION RISK:  
Pt Is At Nutrition Risk  [x] No Nutrition Risk Identified  [] PT SEEN FOR:  
 []  MD Consult: []Calorie Count []Diabetic Diet Education []Diet Education []Electrolyte Management []General Nutrition Management and Supplements []Management of Tube Feeding []TPN Recommendations [x]  RN Referral:  [x]MST score >=2 
   []Enteral/Parenteral Nutrition PTA []Pregnant: Gestational DM or Multigestation  
              [] Pressure Ulcer 
   
[]  Low BMI      []  Length of Stay       [] Dysphagia Diet     [] Ventilator      [] Follow-Up Previous Recommendations: 
 [] Implemented          [] Not Implemented          [x] Not Applicable Previous Goal: 
 [] Met              [] Progressing Towards Goal              [] Not Progressing Towards Goal   [x] Not Applicable Melissa Jain RD Pager: [phone] Office: [phone]

## 2019-01-10 NOTE — PROGRESS NOTES
Problem: Dysphagia (Adult) Goal: *Acute Goals and Plan of Care (Insert Text) 
swalllowing goals initiated 1-9-19: (weekly re-eval 1-16-19) 1)  Tolerate mech soft, honey thick  without increased s/s aspiration by 1--16-19 
2)  MBS to determine least restrictive and safe diet by 1-11-19 -met Attempted to see patient for diet tolerance with a meal, however patient is still awaiting his lunch tray. Wife present at bedside and had questions regarding MBS yesterday. Provided education regarding MBS results, including silent aspiration of solid and penetration with risk for aspiration with nectar liquids. No penetration/aspiration observed with purees or honey liquids, however patient had pharyngeal residue which still puts him at risk for aspiration. Provided education to wife that although puree/honey is safest, there is still a significant risk of aspiration. Educated that aspiration can cause respiratory distress and pneumonia. Note palliative discussed feeding tubes yesterday and that feeding tubes typically do not help patients with dementia. Wife stated to SLP that she would never want patient to have a feeding tube as her family member had one and did not do well. Question if wife fully understands severity of dysphagia and high risk for aspiration. Will follow for diet tolerance and further education as needed. Thank you. Time spent: 10 minutes Speech language pathology dysphagia treatment Patient: Magda Bhakta (35 y.o. male) Date: 1/10/2019 Diagnosis: Left lower lobe pneumonia ([address] [J18.1] Left lower lobe pneumonia ([address] Precautions: swallow Bed Alarm, Fall ASSESSMENT: 
Patient with moderate oropharyngeal dysphagia characterized by slow, disorganized oral prep, suspected premature spillage, and delayed posterior propulsion. Patient frequently talked with bolus in oral cavity.  Pharyngeal dysphagia is characterized by delayed swallow initiation, decreased hyolaryngeal elevation/excursion, and intermittent double swallows which suggests pharyngeal residue. Patient with intermittent throat clearing and cough x1 with honey thick liquids, and patient will always be at risk for aspiration secondary to severity of oropharyngeal dysphagia, advanced dementia, and cognitive status. Recommend continue puree/honey liquid diet as PEG is not indicated in patients with advanced dementia. Progression toward goals: 
[]         Improving appropriately and progressing toward goals [x]         Improving slowly and progressing toward goals 
[]         Not making progress toward goals and plan of care will be adjusted PLAN: 
Recommendations and Planned Interventions: 
--Continue puree/honey liquid diet, which is safest but still carries risk of aspiration given severity of oropharyngeal dysphagia, advanced dementia, and cognitive status. PEG is not indicated in patients with advanced dementia 
--May benefit from continued discussion regarding aspiration risk and hospice, as patient is at risk for repeated admissions for aspiration pneumonia 
--SLP to follow for diet tolerance and continued education Patient continues to benefit from skilled intervention to address the above impairments. Continue treatment per established plan of care. Discharge Recommendations:  None SUBJECTIVE:  
Patient stated It takes a while to go down.  OBJECTIVE:  
Cognitive and Communication Status: 
Neurologic State: Alert, Confused Orientation Level: Disoriented to place, Disoriented to situation, Disoriented to time Cognition: Decreased command following, Decreased attention/concentration Perception: Appears intact Perseveration: No perseveration noted Safety/Judgement: Lack of insight into deficits, Decreased awareness of environment, Decreased awareness of need for assistance, Decreased awareness of need for safety Dysphagia Treatment: P.O. Trials: Patient Position: upright in bed Vocal quality prior to P.O.: No impairment Consistency Presented: Puree; Honey thick liquid How Presented: Self-fed/presented;Cup/sip;Spoon Bolus Acceptance: No impairment Bolus Formation/Control: Impaired Type of Impairment: Delayed;Premature spillage; Other (comment)(disorganized, talking with food in mouth) Propulsion: Delayed (# of seconds); Discoordination Oral Residue: None Initiation of Swallow: Delayed (# of seconds) Laryngeal Elevation: Decreased Aspiration Signs/Symptoms: Strong cough;Clear throat(with honey liquid) Pharyngeal Phase Characteristics: Double swallow; Suspected pharyngeal residue After treatment:  
[]              Patient left in no apparent distress sitting up in chair 
[x]              Patient left in no apparent distress in bed 
[x]              Call bell left within reach [x]              Nursing notified 
[]              Caregiver present 
[]              Bed alarm activated COMMUNICATION/EDUCATION:  
The patients plan of care including recommendations, planned interventions, and recommended diet changes were discussed with: Speech Therapist. 
[x]              Posted safety precautions in patient's room. CARMENCITA Caceres Time Calculation: 20 mins

## 2019-01-10 NOTE — PROGRESS NOTES
1/10/2019 5:10 PM [facility] denied pt for return. Spoke with pt's wife who was understanding and selected [facility] as backups. Referrals sent via [facility] and [facility]. Pt's wife reported she feels pt will not be ready for discharge on 1/11. Pt's wife reported she is worried pt was discharged too early from his last admission and wants to ensure pt is stable for discharge before he goes to SNF. CM will follow. 1/10/2019 11:13 AM Case management consult for tentative return to rehab tomorrow received. Updates sent to [facility] via [facility]. [facility] has not confirmed they will accept pt back at this time. CM will follow up. SHEKHAR Lawson

## 2019-01-10 NOTE — PROGRESS NOTES
Problem: Mobility Impaired (Adult and Pediatric) Goal: *Acute Goals and Plan of Care (Insert Text) Physical Therapy Goals Initiated 1/9/2019 1. Patient will move from supine to sit and sit to supine , scoot up and down and roll side to side in bed with minimal assistance x 2 within 7 day(s). 2.  Patient will transfer from bed to chair and chair to bed with moderate assistance x 2 using the least restrictive device within 7 day(s). 3.  Patient will perform sit <> stand with moderate assistance x 2  within 7 day(s). 4.  Patient will ambulate with moderate assistance x 2  for 25 feet with RW within 7 day(s). physical Therapy TREATMENT Patient: Darlin Ro (87 y.o. male) Date: 1/10/2019 Diagnosis: Left lower lobe pneumonia ([address] [J18.1] Left lower lobe pneumonia ([address] Precautions: Bed Alarm, Fall Chart, physical therapy assessment, plan of care and goals were reviewed. ASSESSMENT: 
Pt Leoncio Hernandez offers good efforts with PT and demonstrates slowly improving mobility. Pt performs bed mobility, multiple transfers, and brief gait activities with as much as moderate assistance x 2. Further ambulation held due to BP elevated and . Vitals recover to pre-mobility status with return to supine and RN aware. Pt reporting feeling fatigued but denies further complaints during mobility. He remains impulsive and requires very close monitoring and frequent cues to await proper time for mobility. He denies pain throughout encounter. Progression toward goals: 
[]    Improving appropriately and progressing toward goals [x]    Improving slowly and progressing toward goals 
[]    Not making progress toward goals and plan of care will be adjusted PLAN: 
Patient continues to benefit from skilled intervention to address the above impairments. Continue treatment per established plan of care. Discharge Recommendations:  SNF Further Equipment Recommendations for Discharge:  none SUBJECTIVE:  
 Patient stated Julisa Tyson you going to make me wake up?  Pt received supine, agreeable to PT and cleared by RN. OBJECTIVE DATA SUMMARY:  
Critical Behavior: 
Neurologic State: Alert Orientation Level: Oriented to person Cognition: Memory loss, Poor safety awareness Safety/Judgement: Lack of insight into deficits, Decreased awareness of environment, Decreased awareness of need for assistance, Decreased awareness of need for safety Functional Mobility Training: 
Bed Mobility: 
  
Supine to Sit: Additional time;Contact guard assistance; performed x 2. Pt mobilizes to edge of bed, sits x 5 mins and indicates need to return to supine due to fatigue and dizziness. Pt returned to supine for vitals assessment. Bed mobility performed again for further treatment, linens change, and pt hygiene. Sit to Supine: Additional time;Minimum assistance Transfers: 
Sit to Stand: Additional time; Moderate assistance;Assist x2; 
Stand to Sit: Moderate assistance;Assist x2; Additional time Other: bedside commode with mod assist x 2 Balance: 
Sitting: Without support Sitting - Static: Good (unsupported) Sitting - Dynamic: Fair (occasional) Standing: With support Standing - Static: Fair Standing - Dynamic : PoorAmbulation/Gait Training: 
Distance (ft): 3 Feet (ft) Assistive Device: Gait belt;Walker, rolling Ambulation - Level of Assistance: Moderate assistance;Assist x2 Gait Abnormalities: Antalgic;Decreased step clearance Base of Support: Widened Speed/Hilda: Pace decreased (<100 feet/min) Increased multidirectional sway, flexed trunk and knees. Pain: 
  
 denies pain Activity Tolerance:  
Limited by elevated BP and HR. Please refer to the flowsheet for vital signs taken during this treatment. After treatment:  
[]    Patient left in no apparent distress sitting up in chair [x]    Patient left in no apparent distress in bed 
[]    Call bell left within reach [x]    Nursing notified 
[]    Caregiver present [x]    Bed alarm activated COMMUNICATION/COLLABORATION:  
The patients plan of care was discussed with: Registered Nurse and Rehabilitation Attendant Tia Keith PT, DPT Time Calculation: 49 mins

## 2019-01-10 NOTE — PROGRESS NOTES
Shift Summary Bedside and Verbal shift change report given to Rosalba Jorgensen RN (oncoming nurse) by Kaylyn Almonte RN (offgoing nurse). Report included the following information SBAR, Kardex, MAR and Recent Results. Notified by lab + MRSA swab of the Nares. Patient placed on contact. Wife at bedside. Kitchen notified to send thickner for patient drinks. Speech with working with patient. MD notified of elevated BP. New orders to restart home metoprolol and Imdur. Bedside and Verbal shift change report given to [employer] (oncoming nurse) by KRISTEN Lira RN (offgoing nurse). Report included the following information SBAR, Kardex, MAR and Recent Results.

## 2019-01-10 NOTE — PROGRESS NOTES
..Bedside shift change report given to Kingsley Chowdhury RN  (oncoming nurse) by Candie Salgado (offgoing nurse). Report included the following information SBAR, Kardex, Procedure Summary, Intake/Output, MAR, Accordion and Quality Measures.

## 2019-01-11 VITALS
HEART RATE: 86 BPM | DIASTOLIC BLOOD PRESSURE: 79 MMHG | TEMPERATURE: 98.3 F | OXYGEN SATURATION: 96 % | SYSTOLIC BLOOD PRESSURE: 141 MMHG | WEIGHT: 165.2 LBS | RESPIRATION RATE: 18 BRPM | BODY MASS INDEX: 23.65 KG/M2 | HEIGHT: 70 IN

## 2019-01-11 LAB
ALBUMIN SERPL-MCNC: 1.9 G/DL (ref 3.5–5)
ALBUMIN/GLOB SERPL: 0.5 {RATIO} (ref 1.1–2.2)
ALP SERPL-CCNC: 99 U/L (ref 45–117)
ALT SERPL-CCNC: 14 U/L (ref 12–78)
ANION GAP SERPL CALC-SCNC: 8 MMOL/L (ref 5–15)
AST SERPL-CCNC: 14 U/L (ref 15–37)
BASOPHILS # BLD: 0 K/UL (ref 0–0.1)
BASOPHILS NFR BLD: 0 % (ref 0–1)
BILIRUB SERPL-MCNC: 0.3 MG/DL (ref 0.2–1)
BUN SERPL-MCNC: 23 MG/DL (ref 6–20)
BUN/CREAT SERPL: 12 (ref 12–20)
CALCIUM SERPL-MCNC: 8.6 MG/DL (ref 8.5–10.1)
CHLORIDE SERPL-SCNC: 119 MMOL/L (ref 97–108)
CO2 SERPL-SCNC: 27 MMOL/L (ref 21–32)
CREAT SERPL-MCNC: 1.95 MG/DL (ref 0.7–1.3)
DIFFERENTIAL METHOD BLD: ABNORMAL
EOSINOPHIL # BLD: 0.3 K/UL (ref 0–0.4)
EOSINOPHIL NFR BLD: 5 % (ref 0–7)
ERYTHROCYTE [DISTWIDTH] IN BLOOD BY AUTOMATED COUNT: 16.2 % (ref 11.5–14.5)
GLOBULIN SER CALC-MCNC: 4 G/DL (ref 2–4)
GLUCOSE SERPL-MCNC: 92 MG/DL (ref 65–100)
HCT VFR BLD AUTO: 27.9 % (ref 36.6–50.3)
HGB BLD-MCNC: 8.2 G/DL (ref 12.1–17)
IMM GRANULOCYTES # BLD AUTO: 0.1 K/UL (ref 0–0.04)
IMM GRANULOCYTES NFR BLD AUTO: 1 % (ref 0–0.5)
LYMPHOCYTES # BLD: 1.4 K/UL (ref 0.8–3.5)
LYMPHOCYTES NFR BLD: 20 % (ref 12–49)
MAGNESIUM SERPL-MCNC: 2 MG/DL (ref 1.6–2.4)
MCH RBC QN AUTO: 28.2 PG (ref 26–34)
MCHC RBC AUTO-ENTMCNC: 29.4 G/DL (ref 30–36.5)
MCV RBC AUTO: 95.9 FL (ref 80–99)
MONOCYTES # BLD: 1 K/UL (ref 0–1)
MONOCYTES NFR BLD: 15 % (ref 5–13)
NEUTS SEG # BLD: 4.1 K/UL (ref 1.8–8)
NEUTS SEG NFR BLD: 59 % (ref 32–75)
NRBC # BLD: 0 K/UL (ref 0–0.01)
NRBC BLD-RTO: 0 PER 100 WBC
PLATELET # BLD AUTO: 424 K/UL (ref 150–400)
PMV BLD AUTO: 10.9 FL (ref 8.9–12.9)
POTASSIUM SERPL-SCNC: 3.8 MMOL/L (ref 3.5–5.1)
PROT SERPL-MCNC: 5.9 G/DL (ref 6.4–8.2)
RBC # BLD AUTO: 2.91 M/UL (ref 4.1–5.7)
SODIUM SERPL-SCNC: 154 MMOL/L (ref 136–145)
WBC # BLD AUTO: 7 K/UL (ref 4.1–11.1)

## 2019-01-11 PROCEDURE — 94640 AIRWAY INHALATION TREATMENT: CPT

## 2019-01-11 PROCEDURE — 80053 COMPREHEN METABOLIC PANEL: CPT

## 2019-01-11 PROCEDURE — 83735 ASSAY OF MAGNESIUM: CPT

## 2019-01-11 PROCEDURE — 74011000258 HC RX REV CODE- 258: Performed by: INTERNAL MEDICINE

## 2019-01-11 PROCEDURE — 36415 COLL VENOUS BLD VENIPUNCTURE: CPT

## 2019-01-11 PROCEDURE — 74011000250 HC RX REV CODE- 250: Performed by: INTERNAL MEDICINE

## 2019-01-11 PROCEDURE — 85025 COMPLETE CBC W/AUTO DIFF WBC: CPT

## 2019-01-11 PROCEDURE — 74011250637 HC RX REV CODE- 250/637: Performed by: INTERNAL MEDICINE

## 2019-01-11 PROCEDURE — 74011250637 HC RX REV CODE- 250/637: Performed by: FAMILY MEDICINE

## 2019-01-11 RX ORDER — HYDRALAZINE HYDROCHLORIDE 10 MG/1
10 TABLET, FILM COATED ORAL
Status: DISCONTINUED | OUTPATIENT
Start: 2019-01-11 | End: 2019-01-11 | Stop reason: HOSPADM

## 2019-01-11 RX ORDER — CEFDINIR 300 MG/1
300 CAPSULE ORAL EVERY 12 HOURS
Qty: 12 CAP | Refills: 0 | Status: SHIPPED
Start: 2019-01-11

## 2019-01-11 RX ADMIN — LACOSAMIDE 150 MG: 100 TABLET, FILM COATED ORAL at 08:49

## 2019-01-11 RX ADMIN — CEFDINIR 300 MG: 300 CAPSULE ORAL at 08:49

## 2019-01-11 RX ADMIN — PANTOPRAZOLE SODIUM 40 MG: 40 GRANULE, DELAYED RELEASE ORAL at 06:15

## 2019-01-11 RX ADMIN — ARFORMOTEROL TARTRATE 15 MCG: 15 SOLUTION RESPIRATORY (INHALATION) at 08:14

## 2019-01-11 RX ADMIN — ASPIRIN 81 MG: 81 TABLET, COATED ORAL at 08:49

## 2019-01-11 RX ADMIN — VALPROIC ACID 500 MG: 250 CAPSULE, LIQUID FILLED ORAL at 08:49

## 2019-01-11 RX ADMIN — BUDESONIDE 500 MCG: 0.5 INHALANT RESPIRATORY (INHALATION) at 08:14

## 2019-01-11 RX ADMIN — OXYBUTYNIN CHLORIDE 10 MG: 5 TABLET, EXTENDED RELEASE ORAL at 08:49

## 2019-01-11 RX ADMIN — APIXABAN 5 MG: 5 TABLET, FILM COATED ORAL at 08:49

## 2019-01-11 RX ADMIN — ISOSORBIDE MONONITRATE 30 MG: 30 TABLET, EXTENDED RELEASE ORAL at 08:49

## 2019-01-11 RX ADMIN — DEXTROSE MONOHYDRATE AND SODIUM CHLORIDE 100 ML/HR: 5; .45 INJECTION, SOLUTION INTRAVENOUS at 06:16

## 2019-01-11 RX ADMIN — Medication 10 ML: at 06:16

## 2019-01-11 RX ADMIN — ACETAMINOPHEN 650 MG: 325 TABLET, FILM COATED ORAL at 10:58

## 2019-01-11 RX ADMIN — METOPROLOL TARTRATE 12.5 MG: 25 TABLET ORAL at 08:49

## 2019-01-11 NOTE — PROGRESS NOTES
Report given to [address] [place] [address] at [facility]. Opportunity for questions/clarification provided.  Discharge instructions in packet for transport to  at [facility]

## 2019-01-11 NOTE — PALLIATIVE CARE DISCHARGE
Goals of Care/Treatment Preferences The Palliative Medicine team was consulted as part of your/your loved one's care in the hospital. Our team is a supportive service; we strive to relieve suffering and improve quality of life. You met with Dr. Sam Rothman. We reviewed advance care planning information, which includes the following: 
Patient's [place] is[de-identified] Legal Next of Kin: Wife Kamilah Doe Confirm Advance Directive: Yes, not on file Patient/Health Care Proxy Stated Goals: Other (comment)(Full restorative measures for now) We reviewed / discussed your code status as: DNR 
   Full Code means perform CPR in the event of cardiac arrest. 
    DNR means do NOT perform CPR in the event of cardiac arrest. 
    Partial Code means you have specific preferences, please discuss with your healthcare team. 
    Eugene Iha means this issue was not addressed / resolved during your stay Medical Interventions: Limited additional interventions Other Instructions: While in the hospital a Durable Do Not Resuscitate Order was completed on your behalf, this should travel with you. When you are in a facility, this form should be placed on your chart. Once you are home, it is recommended that the [facility] form be placed in a visible location such as on the refrigerator or bedroom door. Please provide a copy of your Advance Medical Directive to be scanned into your medical record if you return to the hospital. 
 
  
 
Because of the importance of this information, we are providing you with a printed copy to share with other healthcare providers after this hospitalization is complete.

## 2019-01-11 NOTE — PROGRESS NOTES
Pt received from [facility] at approx. 2330. Pt was alert and confused but in stable condition at that time. 0400 Pt continued to be very active in the middle of the a.m and was uncooperative. Disconnected his IV fluids for a short period to protect IV access. Tried dressing w/gauze but pt removed the gauze. 0530 Pt was much calmer and cooperative. Reconnected IV fluids and rechecked /76 will cont to monitor.

## 2019-01-11 NOTE — PROGRESS NOTES
1/11/2019 5:17 PM Received call transport will be to [facility] between 5:45-6PM. CM notified pt's RN. Also called and notified pt's wife of delay in discharge. 1/11/2019 1:32 PM Discharge was sent to [address] [place] [address] via [facility]. Ambulance packet complete with pt's DDNR on pt's hardchart. 1/11/2019  12:39 PM Spoke with pt's wife who reported [address] [place], [place] [address]way is preference. [facility] confirmed they can accept pt today. Ambulance transport is arranged for 5PM to [address] [place] [address]. Nursing please call report to [phone]. Called and lvm with pt's attending. 1/11/2019  12:00 PM [facility]. [address] [place] [address] has accepted. Spoke with pt's wife who reported she does prefer [address] [place] [address] location and requested CM look into [facility] for pt. CM sent referral to [facility] via [employer]. Spoke with Rosalva Jackson in admissions and relayed pt's referral and ready for discharge today. Rosalva Jackson reported she will follow up with CM.  
 
1/11/2019 11:43 AM No response from [facility] or [address] [place] [address] [facility]. Called and lvm with admissions at [facility]. CM will follow up.   
 
1/11/2019  8:40 AM Spoke with pt's attending, pt is ready for discharge. Called and spoke with [facility] at [facility] and Rehab regarding pt's referral requested admission decision as pt is ready for discharge today. CM will follow up. Radha Dukes, SHEKHAR

## 2019-01-11 NOTE — PROGRESS NOTES
Palliative Medicine Consult Patient Name: Harmony Oconnor YOB: 1945 Date of Initial Consult: 1/9/19 Reason for Consult: care decisions Requesting Provider: Dr. Sandra Casanova Primary Care Physician: Naye De Oliveira DO 
  
 SUMMARY:  
Harmony Oconnor is a 68 y.o. with a past history of dementia, recent hip fracture, HTN, BPH, aspiration, GERD, seizures, who was admitted on 1/8/2019 from [address] [place] [address] with a diagnosis of pneumonia/MIGUEL/hypernatremia. Current medical issues leading to Palliative Medicine involvement include: care decisions. Chart reviewed/HPI-patient admitted to the hospital from [address] [place] [address] with pneumonia->suspected aspiration. Patient just left the hospital 1 week ago after a hip fracture. In discussion with his spouse, he has had significant decline over the last several months with poor po intake, weight loss, issues with aspiration/dysphagia that even goes back to last summer. SH-just moved to the [facility] area in the last couple of months to be closer to 2 daughters(Elida Musa and LORNE). Son lives in [facility] off. Spouse admits struggling caring for him and is thinking about long term care placement PALLIATIVE DIAGNOSES:  
1. GOC discussion 2. DNR review 3. ACP review 4. Debility 5. Aspiration pneumonia 6. Dementia-appears to be progressing with poor po intake, weight loss(Albumin of 1.5 on 1/2) PLAN:  
1. Met with patient and his spouse(at bedside). Reviewed current medical issues. Again explained the concern about his ongoing aspiration and he is at high risk of recurrent infections. Also explained placing a feeding tube would not stop all aspiration. She is clear that he would not want a PEG. 2. GOC-continue current therapy. HONORIO([facility]) working on options as far as SNF. He has been declined by [address] [place] [address] and [facility].  Spouse wants him to attempt rehab but is worried that he might ultimately be a long term nursing home resident because of her inability to care for him. We also discussed the possibility of Hospice in the future, especially if he continues to have recurrent infections/hospitalizations. Spouse would potentially be open to that idea in the future if this does not go well. 3. AMD-she states has completed and will bring a copy to the hospital. Spouse forgot to bring. 4. Code status-reviewed again with her and she is clear on DNAR/DNI in both arrest and pre-arrest situation. We completed a DDNR today. 2 copies given to spouse, original placed in the chart and a copy in the chart to be scanned. 5. Symptom management-no acute symptoms for us to manage 6. Psychosocial-good support from family. Spouse feeling a little overwhelmed with everything. No spiritual concerns 7. Discussed with CM([facility]en) and bedside nurse 8. Initial consult note routed to primary continuity provider 9. Communicated plan of care with: Palliative IDT 
 
 
 GOALS OF CARE / TREATMENT PREFERENCES:  
[====Goals of Care====] GOALS OF CARE: 
Patient/Health Care Proxy Stated Goals: Other (comment)(Full restorative measures for now) TREATMENT PREFERENCES:  
Code Status: DNR Advance Care Planning: 
Advance Care Planning 1/10/2019 Patient's Healthcare Decision Maker is: Legal Next of Kin Confirm Advance Directive Yes, on file Medical Interventions: Limited additional interventions Other Instructions: The palliative care team has discussed with patient / health care proxy about goals of care / treatment preferences for patient. 
[====Goals of Care====] HISTORY:  
 
History obtained from: chart, CM(Belen) and spouse(via phone) CHIEF COMPLAINT: cough HPI/SUBJECTIVE: The patient is:  
[x] Verbal and participatory [] Non-participatory due to:  
Patient is sleeping at this time so could not have full assessment 1/11-patient is awake, alert but remains confused.  Still some  pain(recent hip fracture) per spouse but when I ask him, he denies pain Clinical Pain Assessment (nonverbal scale for severity on nonverbal patients):  
Clinical Pain Assessment Severity: 0 FUNCTIONAL ASSESSMENT:  
 
Palliative Performance Scale (PPS): PPS: 50 PSYCHOSOCIAL/SPIRITUAL SCREENING:  
 
Advance Care Planning: 
Advance Care Planning 1/10/2019 Patient's Healthcare Decision Maker is: Legal Next of Kin Confirm Advance Directive Yes, on file Any spiritual / Yarsanism concerns: 
[] Yes /  [x] No 
 
Caregiver Burnout: 
[x] Yes /  [] No /  [] No Caregiver Present-spouse at risk Anticipatory grief assessment:  
[x] Normal  / [] Maladaptive ESAS Anxiety: ESAS Depression:    
 could not assess today REVIEW OF SYSTEMS:  
 
Positive and pertinent negative findings in ROS are noted above in HPI. The following systems were [x] reviewed / [] unable to be reviewed as noted in HPI Other findings are noted below. Systems: constitutional, ears/nose/mouth/throat, respiratory, gastrointestinal, genitourinary, musculoskeletal, integumentary, neurologic, psychiatric, endocrine. Positive findings noted below. Modified ESAS Completed by: provider Fatigue: 1 Drowsiness: 1 Pain: 0 Nausea: 0 Anorexia: 0 Dyspnea: 2 Constipation: No  
  Stool Occurrence(s): 1 PHYSICAL EXAM:  
 
From RN flowsheet: 
Wt Readings from Last 3 Encounters:  
01/09/19 165 lb 3.2 oz (74.9 kg) 01/01/19 176 lb 8 oz (80.1 kg) 12/22/16 182 lb 15.7 oz (83 kg) Blood pressure (!) 131/97, pulse 96, temperature 98.7 °F (37.1 °C), resp. rate 18, height 5' 10\" (1.778 m), weight 165 lb 3.2 oz (74.9 kg), SpO2 99 %. Pain Scale 1: Visual 
Pain Intensity 1: 0 Last bowel movement, if known:  
 
Constitutional: awake, alert, NAD, confused Eyes: pupils equal, anicteric ENMT: no nasal discharge, moist mucous membranes Cardiovascular: regular rhythm, distal pulses intact Respiratory: breathing not labored, symmetric Gastrointestinal: soft non-tender, +bowel sounds Musculoskeletal: no deformity, no tenderness to palpation Skin: warm, dry Neurologic:sleeping HISTORY:  
 
Principal Problem: 
  Left lower lobe pneumonia ([address] (1/8/2019) Active Problems: 
  BPH (benign prostatic hyperplasia) (12/27/2018) GERD (gastroesophageal reflux disease) (12/27/2018) HTN (hypertension) (12/27/2018) Hypernatremia (12/27/2018) Closed displaced fracture of left femoral neck ([address] (12/27/2018) Dementia (12/27/2018) Seizures ([address] () Past Medical History:  
Diagnosis Date  Anxiety  BPH (benign prostatic hyperplasia)  Depression  GERD (gastroesophageal reflux disease)  Hypertension  Seizures ([address] Past Surgical History:  
Procedure Laterality Date  HX COLONOSCOPY    
 HX HEENT    
 nasal septum  PA EGD DELIVER THERMAL ENERGY SPHNCTR/CARDIA GERD Family History Problem Relation Age of Onset  Cancer Father  Heart Disease Father History reviewed, no pertinent family history. Social History Tobacco Use  Smoking status: Never Smoker  Smokeless tobacco: Never Used Substance Use Topics  Alcohol use: No  
 
Allergies Allergen Reactions  Augmentin [Amoxicillin-Pot Clavulanate] Rash Tolerates cephalosporins Current Facility-Administered Medications Medication Dose Route Frequency  hydrALAZINE (APRESOLINE) tablet 10 mg  10 mg Oral TID PRN  
 cefdinir (OMNICEF) capsule 300 mg  300 mg Oral Q12H  pantoprazole (PROTONIX) granules for oral suspension 40 mg  40 mg Oral ACB  metoprolol tartrate (LOPRESSOR) tablet 12.5 mg  12.5 mg Oral BID  isosorbide mononitrate ER (IMDUR) tablet 30 mg  30 mg Oral DAILY  sodium chloride (NS) flush 5-40 mL  5-40 mL IntraVENous Q8H  
 sodium chloride (NS) flush 5-40 mL  5-40 mL IntraVENous PRN  
  acetaminophen (TYLENOL) tablet 650 mg  650 mg Oral Q4H PRN  
 ondansetron (ZOFRAN) injection 4 mg  4 mg IntraVENous Q4H PRN  
 dextrose 5 % - 0.45% NaCl infusion  100 mL/hr IntraVENous CONTINUOUS  
 apixaban (ELIQUIS) tablet 5 mg  5 mg Oral BID  albuterol (PROVENTIL VENTOLIN) nebulizer solution 2.5 mg  2.5 mg Nebulization Q4H PRN  
 aspirin delayed-release tablet 81 mg  81 mg Oral DAILY  atorvastatin (LIPITOR) tablet 40 mg  40 mg Oral QHS  donepezil (ARICEPT) tablet 10 mg  10 mg Oral QHS  lacosamide (VIMPAT) tablet 150 mg  150 mg Oral Q12H  
 memantine (NAMENDA) tablet 10 mg  10 mg Oral QHS  valproic acid (DEPAKENE) capsule 500 mg  500 mg Oral BID  
 oxybutynin chloride XL (DITROPAN XL) tablet 10 mg  10 mg Oral DAILY  arformoterol (BROVANA) neb solution 15 mcg  15 mcg Nebulization BID RT  
 budesonide (PULMICORT) 500 mcg/2 ml nebulizer suspension  500 mcg Nebulization BID RT  
 
 
 
 LAB AND IMAGING FINDINGS:  
 
Lab Results Component Value Date/Time WBC 7.0 01/11/2019 05:44 AM  
 HGB 8.2 (L) 01/11/2019 05:44 AM  
 PLATELET 382 (H) 85/81/0672 05:44 AM  
 
Lab Results Component Value Date/Time Sodium 154 (H) 01/11/2019 05:44 AM  
 Potassium 3.8 01/11/2019 05:44 AM  
 Chloride 119 (H) 01/11/2019 05:44 AM  
 CO2 27 01/11/2019 05:44 AM  
 BUN 23 (H) 01/11/2019 05:44 AM  
 Creatinine 1.95 (H) 01/11/2019 05:44 AM  
 Calcium 8.6 01/11/2019 05:44 AM  
 Magnesium 2.0 01/11/2019 05:44 AM  
 Phosphorus 3.0 01/02/2019 05:30 AM  
  
Lab Results Component Value Date/Time AST (SGOT) 14 (L) 01/11/2019 05:44 AM  
 Alk. phosphatase 99 01/11/2019 05:44 AM  
 Protein, total 5.9 (L) 01/11/2019 05:44 AM  
 Albumin 1.9 (L) 01/11/2019 05:44 AM  
 Globulin 4.0 01/11/2019 05:44 AM  
 
No results found for: INR, PTMR, PTP, PT1, PT2, APTT No results found for: IRON, FE, TIBC, IBCT, PSAT, FERR No results found for: PH, PCO2, PO2 No components found for: [place] Lab Results Component Value Date/Time  12/27/2018 11:20 AM  
  
 
 
   
 
Total time: 35 
Counseling / coordination time, spent as noted above: 30 
> 50% counseling / coordination?: yes Prolonged service was provided for  []30 min   []75 min in face to face time in the presence of the patient, spent as noted above. Time Start:  
Time End:  
Note: this can only be billed with 55533 (initial) or 45680 (follow up). If multiple start / stop times, list each separately.

## 2019-01-11 NOTE — ACP (ADVANCE CARE PLANNING)
Primary Decision Maker (Health Care Agent): Natalia Certain  Relationship to patient:spouse  Phone   [] Named in a scanned document   [x] Legal Next of Jaclyn Griffin to bring AMD  [] Guardian      ACP documents you current have include:  [x] Advance Directive or Living Will  [x] Durable Do Not Resuscitate-completed  [] Physician Orders for Scope of Treatment (POST)  [] Medical Power of   [] Other

## 2019-01-11 NOTE — PROGRESS NOTES
Patient discharged to SNF for rehab today ([facility]). Attempted to meet with family but no one present in patient room. Discharge reviewed. Will need to monitor labs closely in rehab . On discharge Hgb 8.2, slightly up from admission. Hypernatremia- Na elevated on admission and remains elevated at 154. Discharge instruction for rehab to monitor intake and allow free water. Cr 1.95 and trending down.

## 2019-01-11 NOTE — DISCHARGE INSTRUCTIONS
Patient Discharge Instructions    Malick Ruth / [id number] : 1945    Admitted 2019 Discharged: 2019 1:26 PM     ACUTE DIAGNOSES:  Left lower lobe pneumonia ([facility] [J18.1]    CHRONIC MEDICAL DIAGNOSES:  Problem List as of 2019 Date Reviewed: 2019          Codes Class Noted - Resolved    * (Principal) Left lower lobe pneumonia ([facility] ICD-10-CM: J18.1  ICD-9-CM: 294  2019 - Present        Seizures ([facility] ICD-10-CM: R56.9  ICD-9-CM: 780.39  Unknown - Present        Hip fracture ([facility] ICD-10-CM: S72.009A  ICD-9-CM: 820.8  2018 - Present        Thrombocytopenia ([facility] ICD-10-CM: D69.6  ICD-9-CM: 287.5  2018 - Present        Anxiety and depression ICD-10-CM: F41.9, F32.9  ICD-9-CM: 300.00, 311  2018 - Present        BPH (benign prostatic hyperplasia) ICD-10-CM: N40.0  ICD-9-CM: 600.00  2018 - Present        GERD (gastroesophageal reflux disease) ICD-10-CM: K21.9  ICD-9-CM: 530.81  2018 - Present        HTN (hypertension) ICD-10-CM: I10  ICD-9-CM: 401.9  2018 - Present        Seizure ([facility] ICD-10-CM: R56.9  ICD-9-CM: 780.39  2018 - Present        UTI (urinary tract infection) ICD-10-CM: N39.0  ICD-9-CM: 599.0  2018 - Present        Hypernatremia ICD-10-CM: E87.0  ICD-9-CM: 276.0  2018 - Present        Acute on chronic renal failure (HCC) ICD-10-CM: N17.9, N18.9  ICD-9-CM: 584.9, 585.9  2018 - Present        Closed displaced fracture of left femoral neck ([facility] ICD-10-CM: X87.929D  ICD-9-CM: 820.8  2018 - Present        Fall ICD-10-CM: W19. Sander Stout  ICD-9-CM: E888.9  2018 - Present        Dementia ICD-10-CM: F03.90  ICD-9-CM: 294.20  2018 - Present        T1 vertebral fracture ([facility] ICD-10-CM: S22.019A  ICD-9-CM: 805.2  2018 - Present        Trimalleolar fracture of right ankle ICD-10-CM: A11.067L  ICD-9-CM: 824.6  10/24/2012 - Present              DISCHARGE MEDICATIONS:         · It is important that you take the medication exactly as they are prescribed. · Keep your medication in the bottles provided by the pharmacist and keep a list of the medication names, dosages, and times to be taken in your wallet. · Do not take other medications without consulting your doctor. DIET:  Dysphagia diet-pureed    ACTIVITY: Activity as tolerated    ADDITIONAL INFORMATION: If you experience any of the following symptoms then please call your primary care physician or return to the emergency room if you cannot get hold of your doctor: Fever, chills, nausea, vomiting, diarrhea, change in mentation, falling, bleeding, shortness of breath. FOLLOW UP CARE:  Dr. Linh Thorne, [address] [place] [address], DO  you are to call and set up an appointment to see them with in 1 week. Follow-up with specialists at directed by them      Information obtained by :  I understand that if any problems occur once I am at home I am to contact my physician. I understand and acknowledge receipt of the instructions indicated above.                                                                                                                                            Physician's or R.N.'s Signature                                                                  Date/Time                                                                                                                                              Patient or Representative Signature                                                          Date/Time

## 2019-01-11 NOTE — PROGRESS NOTES
Daily Progress Note and Discharge Note: 1/11/2019 Mary Grace Santos, DO Assessment/Plan:  
Left lower lobe pneumonia ([facility] [address] (1/8/2019):  Prior hx of swallow study with aspiration of liquids. Concern for aspiration pneumonia and HCAP. -- empiric levaquin, cefepime, and vanc - switched to po Omnicef x 6 more days 
  
Lactic acidemia:  Not c/w severe sepsis as only has tachycardia for SIRS and no other signs of end organ injury (creatinine elevation is mild with baseline CKD). Given 1L IVF. -- resolved 
  
HTN (hypertension) (12/27/2018):  BP mildly low in ED. Likely from poor po intake and pneumonia. -- resume metoprolol and imdur 
  
Dementia (12/27/2018): Metabolic encephalopathy POA on top of dementia AEB increased confusion, AMS and lethargy 2/2 asp pna requiring treatment with IV ABX,  frequent orienting, Namenda &  aricept:  Likely has acute exacerbations of confusion due to acute illness and multiple recent hospitalizations. -- frequent orienting 
-- continue namenda and aricept 
  
BPH (benign prostatic hyperplasia) (12/27/2018): 
-- may need to consider stopping ditropan if it is thought to be contributing to confusion 
  
GERD (gastroesophageal reflux disease) (12/27/2018): -- continue PPI 
  
Hypernatremia (12/27/2018):  Noted on recent admission as well. -- monitor at SCF/rehab - allow free water 
  
Closed displaced fracture of left femoral neck ([facility] [address] (12/27/2018): Last admission had surgery. Wound healing. 
-- PT/OT 
  
Seizures ([facility] [address] ():  Diagnosed on last admission by neurology. -- continue vimpat and valproic acid 
  
MIGUEL on CKD 3:  Likely from volume depletion. -- monitor  
   
 
Problem List: 
Problem List as of 1/11/2019 Date Reviewed: 1/8/2019 Codes Class Noted - Resolved * (Principal) Left lower lobe pneumonia ([facility] [address] ICD-10-CM: J18.1 ICD-9-CM: 198  1/8/2019 - Present Seizures ([facility] [address] ICD-10-CM: R56.9 ICD-9-CM: 780.39  Unknown - Present Hip fracture ([place] [address] ICD-10-CM: U61.821X ICD-9-CM: 820.8  12/27/2018 - Present Thrombocytopenia ([place] [address] ICD-10-CM: D69.6 ICD-9-CM: 287.5  12/27/2018 - Present Anxiety and depression ICD-10-CM: F41.9, F32.9 ICD-9-CM: 300.00, 311  12/27/2018 - Present BPH (benign prostatic hyperplasia) ICD-10-CM: N40.0 ICD-9-CM: 600.00  12/27/2018 - Present GERD (gastroesophageal reflux disease) ICD-10-CM: K21.9 ICD-9-CM: 530.81  12/27/2018 - Present HTN (hypertension) ICD-10-CM: I10 
ICD-9-CM: 401.9  12/27/2018 - Present Seizure ([place] [address] ICD-10-CM: R56.9 ICD-9-CM: 780.39  12/27/2018 - Present UTI (urinary tract infection) ICD-10-CM: N39.0 ICD-9-CM: 599.0  12/27/2018 - Present Hypernatremia ICD-10-CM: E87.0 ICD-9-CM: 276.0  12/27/2018 - Present Acute on chronic renal failure (HCC) ICD-10-CM: N17.9, N18.9 ICD-9-CM: 584.9, 585.9  12/27/2018 - Present Closed displaced fracture of left femoral neck ([place] [address] ICD-10-CM: S76.454U ICD-9-CM: 820.8  12/27/2018 - Present Fall ICD-10-CM: W19. Cyril Sudeep ICD-9-CM: E888.9  12/27/2018 - Present Dementia ICD-10-CM: F03.90 ICD-9-CM: 294.20  12/27/2018 - Present T1 vertebral fracture ([place] [address] ICD-10-CM: A93.461J 
ICD-9-CM: 805.2  12/27/2018 - Present Trimalleolar fracture of right ankle ICD-10-CM: D17.084K ICD-9-CM: 824.6  10/24/2012 - Present Subjective:  
 68 y.o.  male who is admitted with Left lower lobe pneumonia ([facility] [address] Mr. Jasmina Vu presented to the Emergency Department today from [facility] due to lethargy. Patient slid out of chair yesterday without injury. Today staff noted patient to be more lethargic and not acting like himself. Wife is in ED with patient and reports patient to be very fidgety and has been trying to remove his clothing since recent DC to SNF. Has been confused but not recently worse.   Has been thickening liquids and there has been concern in the past for aspiration. No recent report of coughing or choking with eating/drinking. Denies sob. No known fever. Denies chest pains. No hx of n/v. (Dr Yevgeniy Michele) 
 
:  He has no complaints this AM and says he does not hurt anywhere. Answers questions fairly well but gives inconsistent answers. CXR yesterday revealed LLL pneumonia. Lactic acid back to normal range. Afeb. WBC ok. Na+ sl higher than last admission - cont IVF. Hb down to 7.6 - cont to monitor. Creat in 2s and slowly improving. 1/10:  No complaints. Derails easily when answering questions. No cough. Tmax 99.4. Na+ about the same - will change IVF to 1/2NS. Hb up to 8.9. We will DC IV antibiotics, place on Omnicef po and watch fever curve for next 24 hrs. If ok he can likely go back to rehab tomorrow. :  No complaints. Afebrile. Na+ stable - will need to be followed at SNF/rehab. BP up and down (125/84, 128/81 yesterday pm; 152/97 this AM). BP meds may need to be adjusted at rehab but labile BPs more likely due to unfamiliar location/anxiety/pain. Stable for rehab when they have a bed. 1pm: Accepted at [facility]. F/U with PCP when out of rehab. Review of Systems:  
Review of systems not obtained due to patient factors. Objective:  
Physical Exam:  
 
Visit Vitals BP (!) 152/97 (BP 1 Location: Left arm, BP Patient Position: At rest) Comment: ELSA Borrego notified Pulse 89 Temp 98.2 °F (36.8 °C) Resp 16 Ht 5' 10\" (1.778 m) Wt 74.9 kg (165 lb 3.2 oz) SpO2 97% BMI 23.70 kg/m² O2 Device: Room air Temp (24hrs), Av.1 °F (36.7 °C), Min:97.9 °F (36.6 °C), Max:98.2 °F (36.8 °C) No intake/output data recorded.  190 0700 In: 360 [P.O.:360] Out: - General:  thin, cooperative, no distress, appears stated age. Head:  Normocephalic, without obvious abnormality, atraumatic. Eyes:  Conjunctivae/corneas clear. EOMs intact. Nose: Nares normal. Septum midline. Mucosa normal. No drainage or sinus tenderness. Throat: Lips, mucosa, and tongue moist.. Neck: Supple, symmetrical, trachea midline, no adenopathy, thyroid: no enlargement/tenderness/nodules, no carotid bruit and no JVD. Back:   Symmetric, no curvature. ROM normal. No CVA tenderness. Lungs:   A few scattered rhonchi bilaterally - no rales currently - clearer. Chest wall:  No tenderness or deformity. Heart:  Regular rate and rhythm, S1, S2 normal, no murmur, click, rub or gallop. Abdomen:   Soft, non-tender. Bowel sounds normal. No masses,  No organomegaly. Extremities: no cyanosis or edema. No calf tenderness or cords. Left hip surg site without signs of infection Pulses: 2+ and symmetric all extremities. Skin: Skin color, texture, turgor normal. No rashes or lesions Neurologic: Alert and oriented X 1-2. Will usually follow 1 step commands and moves extrem to command.  equal.  No cogwheeling or rigidity. Gait not tested at this time. Sensation grossly normal to touch. Gross motor of extremities normal.    
 
Data Review:  
  Port CXR 1/8/19 IMPRESSION: Patchy consolidation of left mid and lower lung. Recent Days: 
Recent Labs  
  01/11/19 
0544 01/10/19 
0326 01/09/19 
0542 WBC 7.0 7.7 8.7 HGB 8.2* 8.9* 7.6* HCT 27.9* 29.7* 25.3*  
* 458* 480* Recent Labs  
  01/11/19 
0544 01/10/19 
0326 01/09/19 
0542  01/08/19 
1540 * 153* 152*   < > 151*  
K 3.8 3.8 4.0   < > 4.7 * 116* 116*   < > 112* CO2 27 28 28   < > 29 GLU 92 101* 100   < > 102* BUN 23* 21* 26*   < > 30* CREA 1.95* 2.10* 2.19*   < > 2.51* CA 8.6 9.1 8.8   < > 9.1 MG 2.0 2.1 2.1  --   --   
ALB 1.9* 2.0*  --   --  2.2* TBILI 0.3 0.3  --   --  0.2 SGOT 14* 20  --   --  17 ALT 14 14  --   --  19  
 < > = values in this interval not displayed. No results for input(s): PH, PCO2, PO2, HCO3, FIO2 in the last 72 hours. 24 Hour Results: 
Recent Results (from the past 24 hour(s)) METABOLIC PANEL, COMPREHENSIVE Collection Time: 01/11/19  5:44 AM  
Result Value Ref Range Sodium 154 (H) 136 - 145 mmol/L Potassium 3.8 3.5 - 5.1 mmol/L Chloride 119 (H) 97 - 108 mmol/L  
 CO2 27 21 - 32 mmol/L Anion gap 8 5 - 15 mmol/L Glucose 92 65 - 100 mg/dL BUN 23 (H) 6 - 20 MG/DL Creatinine 1.95 (H) 0.70 - 1.30 MG/DL  
 BUN/Creatinine ratio 12 12 - 20 GFR est AA 41 (L) >60 ml/min/1.73m2 GFR est non-AA 34 (L) >60 ml/min/1.73m2 Calcium 8.6 8.5 - 10.1 MG/DL Bilirubin, total 0.3 0.2 - 1.0 MG/DL  
 ALT (SGPT) 14 12 - 78 U/L  
 AST (SGOT) 14 (L) 15 - 37 U/L Alk. phosphatase 99 45 - 117 U/L Protein, total 5.9 (L) 6.4 - 8.2 g/dL Albumin 1.9 (L) 3.5 - 5.0 g/dL Globulin 4.0 2.0 - 4.0 g/dL A-G Ratio 0.5 (L) 1.1 - 2.2 MAGNESIUM Collection Time: 01/11/19  5:44 AM  
Result Value Ref Range Magnesium 2.0 1.6 - 2.4 mg/dL CBC WITH AUTOMATED DIFF Collection Time: 01/11/19  5:44 AM  
Result Value Ref Range WBC 7.0 4.1 - 11.1 K/uL  
 RBC 2.91 (L) 4.10 - 5.70 M/uL HGB 8.2 (L) 12.1 - 17.0 g/dL HCT 27.9 (L) 36.6 - 50.3 % MCV 95.9 80.0 - 99.0 FL  
 MCH 28.2 26.0 - 34.0 PG  
 MCHC 29.4 (L) 30.0 - 36.5 g/dL  
 RDW 16.2 (H) 11.5 - 14.5 % PLATELET 673 (H) 958 - 400 K/uL MPV 10.9 8.9 - 12.9 FL  
 NRBC 0.0 0  WBC ABSOLUTE NRBC 0.00 0.00 - 0.01 K/uL NEUTROPHILS 59 32 - 75 % LYMPHOCYTES 20 12 - 49 % MONOCYTES 15 (H) 5 - 13 % EOSINOPHILS 5 0 - 7 % BASOPHILS 0 0 - 1 % IMMATURE GRANULOCYTES 1 (H) 0.0 - 0.5 % ABS. NEUTROPHILS 4.1 1.8 - 8.0 K/UL  
 ABS. LYMPHOCYTES 1.4 0.8 - 3.5 K/UL  
 ABS. MONOCYTES 1.0 0.0 - 1.0 K/UL  
 ABS. EOSINOPHILS 0.3 0.0 - 0.4 K/UL  
 ABS. BASOPHILS 0.0 0.0 - 0.1 K/UL  
 ABS. IMM. GRANS. 0.1 (H) 0.00 - 0.04 K/UL  
 DF AUTOMATED Medications reviewed Current Facility-Administered Medications Medication Dose Route Frequency  cefdinir (OMNICEF) capsule 300 mg  300 mg Oral Q12H  pantoprazole (PROTONIX) granules for oral suspension 40 mg  40 mg Oral ACB  metoprolol tartrate (LOPRESSOR) tablet 12.5 mg  12.5 mg Oral BID  isosorbide mononitrate ER (IMDUR) tablet 30 mg  30 mg Oral DAILY  sodium chloride (NS) flush 5-40 mL  5-40 mL IntraVENous Q8H  
 sodium chloride (NS) flush 5-40 mL  5-40 mL IntraVENous PRN  
 acetaminophen (TYLENOL) tablet 650 mg  650 mg Oral Q4H PRN  
 ondansetron (ZOFRAN) injection 4 mg  4 mg IntraVENous Q4H PRN  
 levoFLOXacin (LEVAQUIN) 750 mg in D5W IVPB  750 mg IntraVENous Q48H  
 dextrose 5 % - 0.45% NaCl infusion  100 mL/hr IntraVENous CONTINUOUS  
 apixaban (ELIQUIS) tablet 5 mg  5 mg Oral BID  albuterol (PROVENTIL VENTOLIN) nebulizer solution 2.5 mg  2.5 mg Nebulization Q4H PRN  
 aspirin delayed-release tablet 81 mg  81 mg Oral DAILY  atorvastatin (LIPITOR) tablet 40 mg  40 mg Oral QHS  donepezil (ARICEPT) tablet 10 mg  10 mg Oral QHS  lacosamide (VIMPAT) tablet 150 mg  150 mg Oral Q12H  
 memantine (NAMENDA) tablet 10 mg  10 mg Oral QHS  valproic acid (DEPAKENE) capsule 500 mg  500 mg Oral BID  
 oxybutynin chloride XL (DITROPAN XL) tablet 10 mg  10 mg Oral DAILY  arformoterol (BROVANA) neb solution 15 mcg  15 mcg Nebulization BID RT  
 budesonide (PULMICORT) 500 mcg/2 ml nebulizer suspension  500 mcg Nebulization BID RT Care Plan discussed with: Patient/Family and Nurse Total time spent with patient: 30 minutes.  
Ilana Heart MD

## 2019-01-13 LAB
BACTERIA SPEC CULT: NORMAL
SERVICE CMNT-IMP: NORMAL

## 2019-01-17 ENCOUNTER — PATIENT OUTREACH (OUTPATIENT)
Dept: CASE MANAGEMENT | Age: 74
End: 2019-01-17

## 2019-01-17 NOTE — PROGRESS NOTES
Community Care Team documentation for patient in [facility] Initial Follow Up Patient was discharged to [address]. Information included in this progress note has been provided to SNF. Hospital Admission and Diagnosis: [facility] 1/8-1/11/19   Left lower lobe pneumonia RRAT Score:  23 Advance Care Planning:   DDNR on file Follow up appointments:     Per hospital chart prior to Admission:  patient lives with wife. Wife transports but does not drive at night. Requires assistance with ADLs and IADLs. Pt was at [facility], Medicare days? Prev Home Health used: Unknown PCP : Roderick Harper DO Spoke with SNF team. Ensured patient arrived to SNF safely with admission packet in order. PT/OT/ST providing skilled therapy in SNF. Currently min assist with transfers. Ambulating 125 ft with walker and min assist; max assist with all ADLs and toileting. ST trialing L3-L4 diet. Barrier - silent aspiration. Patient had a fall related to confusion. However, patient significantly more alert as of yesterday 1/16. Patient has been to another SNF recently. Medicare Day 20 is today 1/17 and patient has secondary insurance for copay. Plan for discharge to home with wife vs TERE. LOS TBD. Community Care Team will follow up weekly with [facility] until discharge. Medications were not reconciled and general patient assessment was not completed during this [facility] outreach. Isaías Palencia, MSN, RN, ACNS-BC, [facility] Nurse Navigator, [address] [place] [address] [phone]

## 2019-01-24 ENCOUNTER — PATIENT OUTREACH (OUTPATIENT)
Dept: CASE MANAGEMENT | Age: 74
End: 2019-01-24

## 2019-01-24 NOTE — PROGRESS NOTES
Community Care Team Documentation for Patient in [facility] Subsequent Follow up Patient remains at [facility] and Rehabilitation ([facility]). See previous [facility] Team notes. PCP : Dami Matta,  Spoke with SNF team.  PT/OT/ST continue. Currently supervision with bed mobility. Contact guard assist with transfers. Ambulating 150 ft with front wheel walker and contact guard assist.  Self-care fluctuates from min to max assist with patient's participation and comand following. Remains on puree diet with thickened liquids. PT anticipates 2 more weeeks LOS. OT and ST likely to discontinue services prior to that. Patient is unable to return home with wife at current level of functioning. Wife looking for TERE placement. Medications were not reconciled and general patient assessment was not completed during this skilled nursing facility outreach. Luba Plaencia, MSN, RN, ACNS-BC, [facility] Nurse Navigator, [address] [place] [address] [phone]

## 2019-01-31 ENCOUNTER — PATIENT OUTREACH (OUTPATIENT)
Dept: CASE MANAGEMENT | Age: 74
End: 2019-01-31

## 2019-02-01 NOTE — PROGRESS NOTES
Community Care Team Documentation for Patient in [facility] Subsequent Follow up Patient readmitted from [facility] and Rehabilitation ([facility]). See previous [facility] Team notes. PCP : Naye De Oliveira DO Spoke with SNF team. Pt was readmitted to [facility] 1/28 due to increased pain and swelling to surgical site. Possible transfer to [facility] so ortho MD is able to follow. Medications were not reconciled and general patient assessment was not completed during this skilled nursing facility outreach.

## 2019-03-27 NOTE — DISCHARGE SUMMARY
----- Message from Farnaz Palmer MD sent at 3/27/2019  9:04 AM CDT -----  Results are within normal limits, please advise patient.   Physician Discharge Summary     Patient ID:    Malcolm Esteves  [id number]  68 y.o.  1945  Tonio Vick DO    Admit date: 1/8/2019  Discharge date and time: 1/11/2019  Admission Diagnoses: Left lower lobe pneumonia ([facility] [J18.1]  Chronic Diagnoses:    Problem List as of 1/11/2019 Date Reviewed: 1/8/2019          Codes Class Noted - Resolved    * (Principal) Left lower lobe pneumonia ([facility] ICD-10-CM: J18.1  ICD-9-CM: 486  1/8/2019 - Present        Seizures ([facility] ICD-10-CM: R56.9  ICD-9-CM: 780.39  Unknown - Present        Hip fracture ([facility] ICD-10-CM: S72.009A  ICD-9-CM: 820.8  12/27/2018 - Present        Thrombocytopenia ([facility] ICD-10-CM: D69.6  ICD-9-CM: 287.5  12/27/2018 - Present        Anxiety and depression ICD-10-CM: F41.9, F32.9  ICD-9-CM: 300.00, 311  12/27/2018 - Present        BPH (benign prostatic hyperplasia) ICD-10-CM: N40.0  ICD-9-CM: 600.00  12/27/2018 - Present        GERD (gastroesophageal reflux disease) ICD-10-CM: K21.9  ICD-9-CM: 530.81  12/27/2018 - Present        HTN (hypertension) ICD-10-CM: I10  ICD-9-CM: 401.9  12/27/2018 - Present        Seizure ([facility] ICD-10-CM: R56.9  ICD-9-CM: 780.39  12/27/2018 - Present        UTI (urinary tract infection) ICD-10-CM: N39.0  ICD-9-CM: 599.0  12/27/2018 - Present        Hypernatremia ICD-10-CM: E87.0  ICD-9-CM: 276.0  12/27/2018 - Present        Acute on chronic renal failure (HCC) ICD-10-CM: N17.9, N18.9  ICD-9-CM: 584.9, 585.9  12/27/2018 - Present        Closed displaced fracture of left femoral neck ([facility] ICD-10-CM: W98.465T  ICD-9-CM: 820.8  12/27/2018 - Present        Fall ICD-10-CM: W19. Namrata Dye  ICD-9-CM: E888.9  12/27/2018 - Present        Dementia ICD-10-CM: F03.90  ICD-9-CM: 294.20  12/27/2018 - Present        T1 vertebral fracture ([facility] ICD-10-CM: W11.874P  ICD-9-CM: 805.2  12/27/2018 - Present        Trimalleolar fracture of right ankle ICD-10-CM: B96.349R  ICD-9-CM: 824.6  10/24/2012 - Present            Discharge Medications: Current Discharge Medication List      START taking these medications    Details   cefdinir (OMNICEF) 300 mg capsule Take 1 Cap by mouth every twelve (12) hours. Qty: 12 Cap, Refills: 0         CONTINUE these medications which have NOT CHANGED    Details   Calcium-Cholecalciferol, D3, 500 mg(1,250mg) -400 unit tab Take 1 Tab by mouth three (3) times daily. lacosamide (VIMPAT) 150 mg tab tablet Take 1 Tab by mouth every twelve (12) hours. Max Daily Amount: 300 mg. Qty: 60 Tab, Refills: 0    Associated Diagnoses: Seizure ([facility]      metoprolol tartrate (LOPRESSOR) 25 mg tablet Take 0.5 Tabs by mouth two (2) times a day. Qty: 60 Tab, Refills: 0      valproic acid (DEPAKENE) 250 mg capsule Take 2 Caps by mouth two (2) times a day. Qty: 60 Cap, Refills: 0      HYDROcodone-acetaminophen (NORCO) 5-325 mg per tablet Take 1 Tab by mouth every six (6) hours as needed. Max Daily Amount: 4 Tabs. Qty: 5 Tab, Refills: 0    Associated Diagnoses: Closed fracture of left hip, initial encounter ([facility]      apixaban (ELIQUIS) 5 mg tablet Take 1 Tab by mouth two (2) times a day. Qty: 60 Tab, Refills: 0      atorvastatin (LIPITOR) 40 mg tablet Take 40 mg by mouth nightly. isosorbide mononitrate ER (IMDUR) 30 mg tablet Take 30 mg by mouth daily. aspirin delayed-release 81 mg tablet Take 81 mg by mouth daily. oxybutynin chloride XL (DITROPAN XL) 10 mg CR tablet Take 10 mg by mouth daily. acetaminophen (TYLENOL) 325 mg tablet Take 650 mg by mouth every six (6) hours as needed for Pain. albuterol (PROVENTIL VENTOLIN) 2.5 mg /3 mL (0.083 %) nebulizer solution 2.5 mg by Nebulization route every four (4) hours as needed for Wheezing or Shortness of Breath.      memantine (NAMENDA) 10 mg tablet Take 10 mg by mouth nightly. donepezil (ARICEPT) 10 mg tablet Take 10 mg by mouth nightly. esomeprazole (NEXIUM) 40 mg capsule Take 40 mg by mouth daily.         budesonide-formoterol (SYMBICORT) 160-4.5 mcg/actuation HFA inhaler Take 2 Puffs by inhalation two (2) times a day. Follow up Care:    1. Jacklyn Maurice DO with in 1 weeks  2.  specialists as directed. Diet:  Dysphagia diet-pureed  Disposition:  Rehab. Advanced Directive:  Discharge Exam:  [See today's progress note.]  CONSULTATIONS: Palliative Care    Significant Diagnostic Studies:   Recent Labs     01/11/19  0544 01/10/19  0326   WBC 7.0 7.7   HGB 8.2* 8.9*   HCT 27.9* 29.7*   * 458*     Recent Labs     01/11/19 0544 01/10/19  0326 01/09/19  0542   * 153* 152*   K 3.8 3.8 4.0   * 116* 116*   CO2 27 28 28   BUN 23* 21* 26*   CREA 1.95* 2.10* 2.19*   GLU 92 101* 100   CA 8.6 9.1 8.8   MG 2.0 2.1 2.1     Recent Labs     01/11/19  0544 01/10/19  0326 01/08/19  1540   SGOT 14* 20 17   ALT 14 14 19   AP 99 94 93   TBILI 0.3 0.3 0.2   TP 5.9* 6.1* 6.4   ALB 1.9* 2.0* 2.2*   GLOB 4.0 4.1* 4.2*     Lab Results   Component Value Date/Time    TSH 0.60 04/20/2009 02:58 PM     HOSPITAL COURSE & TREATMENT RENDERED:   1. See today's progress note:    Daily Progress Note and Discharge Note: 1/11/2019  Camacho Jacklyn Lassiter DO         Assessment/Plan:   Left lower lobe pneumonia ([address] (1/8/2019):  Prior hx of swallow study with aspiration of liquids.  Concern for aspiration pneumonia and HCAP. -- empiric levaquin, cefepime, and vanc - switched to po Omnicef x 6 more days     Lactic acidemia:  Not c/w severe sepsis as only has tachycardia for SIRS and no other signs of end organ injury (creatinine elevation is mild with baseline CKD).  Given 1L IVF. -- resolved     HTN (hypertension) (12/27/2018):  BP mildly low in ED. Farida Del Valle from poor po intake and pneumonia. -- resume metoprolol and imdur     Dementia (12/27/2018): Metabolic encephalopathy POA on top of dementia AEB increased confusion, AMS and lethargy 2/2 asp pna requiring treatment with IV ABX,  frequent orienting, Namenda &  aricept: Farida Del Valle has acute exacerbations of confusion due to acute illness and multiple recent hospitalizations. -- frequent orienting  -- continue namenda and aricept     BPH (benign prostatic hyperplasia) (12/27/2018):  -- may need to consider stopping ditropan if it is thought to be contributing to confusion     GERD (gastroesophageal reflux disease) (12/27/2018):  -- continue PPI     Hypernatremia (12/27/2018):  Noted on recent admission as well. -- [place] at [facility] [place] - allow free water     Closed displaced fracture of left femoral neck ([facility] (12/27/2018):  Last admission had surgery.  Wound healing.  -- PT/OT     Seizures ([address] ():  Diagnosed on last admission by neurology. -- continue vimpat and valproic acid     MIGUEL on CKD 3:  Likely from volume depletion. -- monitor          Subjective:    68 y.o.   male who is admitted with Left lower lobe pneumonia (HCC).  Mr. Landon presented to the Emergency Department today from [facility] due to lethargy.  Patient slid out of chair yesterday without injury.  Today staff noted patient to be more lethargic and not acting like himself. Vaishali Rothman is in ED with patient and reports patient to be very fidgety and has been trying to remove his clothing since recent DC to SNF.  Has been confused but not recently worse.  Has been thickening liquids and there has been concern in the past for aspiration.  No recent report of coughing or choking with eating/drinking.  Denies sob.  No known fever.  Denies chest pains.  No hx of n/v.  (Dr Laxmi Farr)     1/9:  He has no complaints this AM and says he does not hurt anywhere. Answers questions fairly well but gives inconsistent answers. CXR yesterday revealed LLL pneumonia. Lactic acid back to normal range. Afeb. WBC ok. Na+ sl higher than last admission - cont IVF. Hb down to 7.6 - cont to monitor. Creat in 2s and slowly improving.      1/10:  No complaints. Derails easily when answering questions. No cough. Tmax 99.4.   Na+ about the same - will change IVF to 1/2NS. Hb up to 8.9. We will DC IV antibiotics, place on Omnicef po and watch fever curve for next 24 hrs. If ok he can likely go back to rehab tomorrow.      :  No complaints. Afebrile. Na+ stable - will need to be followed at SNF/rehab. BP up and down (125/84, 128/81 yesterday pm; 152/97 this AM). BP meds may need to be adjusted at rehab but labile BPs more likely due to unfamiliar location/anxiety/pain. Stable for rehab when they have a bed. 1pm: Accepted at [facility]. F/U with PCP when out of rehab.      Review of Systems:   Review of systems not obtained due to patient factors.     Objective:   Physical Exam:      Visit Vitals  BP (!) 152/97 (BP 1 Location: Left arm, BP Patient Position: At rest) Comment: ELSA Connolly notified   Pulse 89   Temp 98.2 °F (36.8 °C)   Resp 16   Ht 5' 10\" (1.778 m)   Wt 74.9 kg (165 lb 3.2 oz)   SpO2 97%   BMI 23.70 kg/m²      O2 Device: Room air     Temp (24hrs), Av.1 °F (36.7 °C), Min:97.9 °F (36.6 °C), Max:98.2 °F (36.8 °C)    No intake/output data recorded.  1901 -  0700  In: 360 [P.O.:360]  Out: -      General:  thin, cooperative, no distress, appears stated age. Head:  Normocephalic, without obvious abnormality, atraumatic. Eyes:  Conjunctivae/corneas clear. EOMs intact. Nose: Nares normal. Septum midline. Mucosa normal. No drainage or sinus tenderness. Throat: Lips, mucosa, and tongue moist..   Neck: Supple, symmetrical, trachea midline, no adenopathy, thyroid: no enlargement/tenderness/nodules, no carotid bruit and no JVD. Back:   Symmetric, no curvature. ROM normal. No CVA tenderness. Lungs:   A few scattered rhonchi bilaterally - no rales currently - clearer. Chest wall:  No tenderness or deformity. Heart:  Regular rate and rhythm, S1, S2 normal, no murmur, click, rub or gallop. Abdomen:   Soft, non-tender. Bowel sounds normal. No masses,  No organomegaly. Extremities: no cyanosis or edema. No calf tenderness or cords. Left hip surg site without signs of infection   Pulses: 2+ and symmetric all extremities. Skin: Skin color, texture, turgor normal. No rashes or lesions   Neurologic: Alert and oriented X 1-2. Will usually follow 1 step commands and moves extrem to command.  equal.  No cogwheeling or rigidity. Gait not tested at this time. Sensation grossly normal to touch.   Gross motor of extremities normal.        Data Review:     Port CXR 1/8/19  IMPRESSION: Patchy consolidation of left mid and lower lung.     Signed:  Mardi Hodgkins, MD  1/11/2019  1:27 PM

## 2023-03-02 NOTE — PROGRESS NOTES
Problem: Pressure Injury - Risk of 
Goal: *Prevention of pressure injury Document Bean Scale and appropriate interventions in the flowsheet. Outcome: Progressing Towards Goal 
Pressure Injury Interventions: 
Sensory Interventions: Assess changes in LOC, Check visual cues for pain, Discuss PT/OT consult with provider, Float heels, Keep linens dry and wrinkle-free, Maintain/enhance activity level, Minimize linen layers, Pad between skin to skin, Pressure redistribution bed/mattress (bed type) Moisture Interventions: Absorbent underpads, Apply protective barrier, creams and emollients, Check for incontinence Q2 hours and as needed, Internal/External urinary devices, Limit adult briefs, Maintain skin hydration (lotion/cream), Minimize layers, Offer toileting Q_hr, Moisture barrier Activity Interventions: Increase time out of bed, Pressure redistribution bed/mattress(bed type), PT/OT evaluation Mobility Interventions: Float heels, HOB 30 degrees or less, Pressure redistribution bed/mattress (bed type), PT/OT evaluation, Turn and reposition approx. every two hours(pillow and wedges) Nutrition Interventions: Document food/fluid/supplement intake, Offer support with meals,snacks and hydration Friction and Shear Interventions: Apply protective barrier, creams and emollients, HOB 30 degrees or less, Lift sheet, Lift team/patient mobility team, Minimize layers Problem: Falls - Risk of 
Goal: *Absence of Falls Document Lisa Barcenas Fall Risk and appropriate interventions in the flowsheet. Outcome: Progressing Towards Goal 
Fall Risk Interventions: 
Mobility Interventions: Communicate number of staff needed for ambulation/transfer, OT consult for ADLs, Patient to call before getting OOB, PT Consult for mobility concerns, PT Consult for assist device competence, Utilize walker, cane, or other assistive device, Utilize gait belt for transfers/ambulation, Bed/chair exit alarm Mentation Interventions: Adequate sleep, hydration, pain control, Bed/chair exit alarm, Door open when patient unattended, Family/sitter at bedside, Increase mobility, More frequent rounding, Reorient patient, Toileting rounds, Update white board, Room close to nurse's station, Gait belt with transfers/ambulation Medication Interventions: Assess postural VS orthostatic hypotension, Patient to call before getting OOB, Teach patient to arise slowly, Bed/chair exit alarm, Utilize gait belt for transfers/ambulation Elimination Interventions: Call light in reach, Bed/chair exit alarm, Patient to call for help with toileting needs, Toileting schedule/hourly rounds History of Falls Interventions: Door open when patient unattended, Bed/chair exit alarm, Investigate reason for fall, Utilize gait belt for transfer/ambulation Unknown

## (undated) DEVICE — SKIN MARKER,REGULAR TIP WITH RULER AND LABELS: Brand: DEVON

## (undated) DEVICE — SOL IRR SOD CL 0.9% 1000ML BTL --

## (undated) DEVICE — HANDPIECE SET WITH COAXIAL HIGH FLOW TIP AND SUCTION TUBE: Brand: INTERPULSE

## (undated) DEVICE — STERILE POLYISOPRENE POWDER-FREE SURGICAL GLOVES: Brand: PROTEXIS

## (undated) DEVICE — PREP KIT PEEL PTCH POVIDONE IOD

## (undated) DEVICE — Device

## (undated) DEVICE — SUTURE MCRYL SZ 2-0 L36IN ABSRB UD L36MM CT-1 1/2 CIR Y945H

## (undated) DEVICE — REM POLYHESIVE ADULT PATIENT RETURN ELECTRODE: Brand: VALLEYLAB

## (undated) DEVICE — GOWN,NON-REINFORCED,XXL: Brand: MEDLINE

## (undated) DEVICE — TOWEL SURG W17XL27IN STD BLU COT NONFENESTRATED PREWASHED

## (undated) DEVICE — SUTURE STRATAFIX SPRL MCRYL + SZ 4-0 L12IN ABSRB UD PS-2 SXMP1B117

## (undated) DEVICE — SOLUTION IRRIG 3000ML 0.9% SOD CHL FLX CONT 0797208] ICU MEDICAL INC]

## (undated) DEVICE — SUTURE STRATAFIX SPRL SZ 1 L14IN ABSRB VLT L48CM CTX 1/2 SXPD2B405

## (undated) DEVICE — BIT DRL L5IN DIA2MM STD ST S STL TWST BUSA

## (undated) DEVICE — SUTURE ETHBND EXCEL SZ 2 L30IN NONABSORBABLE GRN L40MM V-37 MX69G

## (undated) DEVICE — BLADE ELECTRODE: Brand: EDGE

## (undated) DEVICE — SOLUTION IRRIG 1000ML H2O STRL BLT

## (undated) DEVICE — LIGHT HANDLE: Brand: DEVON

## (undated) DEVICE — STOCKINETTE: Brand: DEROYAL

## (undated) DEVICE — DEVON™ KNEE AND BODY STRAP 60" X 3" (1.5 M X 7.6 CM): Brand: DEVON

## (undated) DEVICE — (D)PREP SKN CHLRAPRP APPL 26ML -- CONVERT TO ITEM 371833

## (undated) DEVICE — STERILE POLYISOPRENE POWDER-FREE SURGICAL GLOVES WITH EMOLLIENT COATING: Brand: PROTEXIS

## (undated) DEVICE — BANDAGE COMPR SELF ADH 5 YDX4 IN TAN STRL PREMIERPRO LF

## (undated) DEVICE — PAD FLR ABS FL BARR BACK DISP 46X40IN

## (undated) DEVICE — CONTAINER,SPECIMEN,3OZ,OR STRL: Brand: MEDLINE

## (undated) DEVICE — 3M™ IOBAN™ 2 ANTIMICROBIAL INCISE DRAPE 6651EZ: Brand: IOBAN™ 2

## (undated) DEVICE — HOOD: Brand: FLYTE

## (undated) DEVICE — GAUZE SPONGES,12 PLY: Brand: CURITY

## (undated) DEVICE — GOWN,SIRUS,POLYRNF,BRTHSLV,XLN/XL,20/CS: Brand: MEDLINE

## (undated) DEVICE — CEMENT MIXING SYSTEM WITH FEMORAL BREAKWAY NOZZLE: Brand: REVOLUTION

## (undated) DEVICE — Z DISCONTINUED LINER BOOT TRACTION NS LINDY LF

## (undated) DEVICE — STRYKER PERFORMANCE SERIES SAGITTAL BLADE: Brand: STRYKER PERFORMANCE SERIES

## (undated) DEVICE — 4-PORT MANIFOLD: Brand: NEPTUNE 2

## (undated) DEVICE — RETRIEVER SUT ARTHSCP HOFFEE --

## (undated) DEVICE — DRAPE,U/ SHT,SPLIT,PLAS,STERIL: Brand: MEDLINE

## (undated) DEVICE — SOL INJ SOD CL 0.9% 1000ML BG --

## (undated) DEVICE — DRSG AQUACEL SURG 3.5 X 10IN -- CONVERT TO ITEM 370183